# Patient Record
Sex: FEMALE | Race: WHITE | NOT HISPANIC OR LATINO | Employment: FULL TIME | URBAN - METROPOLITAN AREA
[De-identification: names, ages, dates, MRNs, and addresses within clinical notes are randomized per-mention and may not be internally consistent; named-entity substitution may affect disease eponyms.]

---

## 2017-01-11 ENCOUNTER — OFFICE VISIT (OUTPATIENT)
Dept: OBGYN | Facility: CLINIC | Age: 22
End: 2017-01-11
Payer: COMMERCIAL

## 2017-01-11 DIAGNOSIS — Z23 NEED FOR HPV VACCINATION: ICD-10-CM

## 2017-01-11 DIAGNOSIS — Z30.09 ENCOUNTER FOR COUNSELING REGARDING INITIATION OF OTHER CONTRACEPTIVE MEASURE: Primary | ICD-10-CM

## 2017-01-11 LAB — BETA HCG QUAL IFA URINE: NEGATIVE

## 2017-01-11 PROCEDURE — 90651 9VHPV VACCINE 2/3 DOSE IM: CPT | Performed by: OBSTETRICS & GYNECOLOGY

## 2017-01-11 PROCEDURE — 99203 OFFICE O/P NEW LOW 30 MIN: CPT | Mod: 25 | Performed by: OBSTETRICS & GYNECOLOGY

## 2017-01-11 PROCEDURE — 84703 CHORIONIC GONADOTROPIN ASSAY: CPT | Performed by: OBSTETRICS & GYNECOLOGY

## 2017-01-11 PROCEDURE — 90471 IMMUNIZATION ADMIN: CPT | Performed by: OBSTETRICS & GYNECOLOGY

## 2017-01-11 NOTE — NURSING NOTE
"Chief Complaint   Patient presents with     IUD       Initial There were no vitals taken for this visit. Estimated body mass index is 26.79 kg/(m^2) as calculated from the following:    Height as of 11/7/16: 5' 5\" (1.651 m).    Weight as of 11/7/16: 161 lb (73.029 kg).  BP completed using cuff size: regular    No obstetric history on file.    The following HM Due: NONE      The following patient reported/Care Every where data was sent to:  P ABSTRACT QUALITY INITIATIVES [[id number]]  none     n/a               "

## 2017-01-11 NOTE — PROGRESS NOTES
Mervin Pacheco is a 21 year old No obstetric history on file. who presents to discuss contraception with IUD.  She is interested in Paragard.  Periods are irregular, light/normal when they come, LMP was 12/5/16.  Has used condoms in the past, but had unprotected intercourse one week ago, used Plan B, UPT is negative today.  Normal pap 9/2015.  STD screen negative 11/2016, denies subsequent risk.     Will need to review detailed risks/benefits when she returns as the focus today was timing of placement, types of IUD.     OBJECTIVE: There were no vitals taken for this visit. Patient was not otherwise examined.      ASSESSMENT: Desire for contraception.  Irregular menses.     PLAN: Discussed contraception.  Option of IUD was discussed.  Risks and benefits were reviewed.  Option of Mirena vs copper IUD was discussed.  After discussion, she elects Mirena.  Discussed need to assure that she was not pregnant prior to placing IUD.  After discussion, she will avoid conception x 2 weeks, then RTC for Mirena.      Please note greater than 50% of this 30 minute appointment were spent in counseling with the patient of the issues described above in the history of present illness and in the plan, including evaluation and managment of contraception, IUD.

## 2017-05-03 ENCOUNTER — OFFICE VISIT (OUTPATIENT)
Dept: OBGYN | Facility: CLINIC | Age: 22
End: 2017-05-03
Payer: COMMERCIAL

## 2017-05-03 VITALS
OXYGEN SATURATION: 97 % | BODY MASS INDEX: 26.13 KG/M2 | DIASTOLIC BLOOD PRESSURE: 73 MMHG | HEART RATE: 129 BPM | WEIGHT: 157 LBS | SYSTOLIC BLOOD PRESSURE: 127 MMHG

## 2017-05-03 DIAGNOSIS — N88.2 STENOTIC CERVICAL OS: Primary | ICD-10-CM

## 2017-05-03 DIAGNOSIS — Z30.430 ENCOUNTER FOR INSERTION OF MIRENA IUD: ICD-10-CM

## 2017-05-03 DIAGNOSIS — Z23 NEED FOR HPV VACCINATION: ICD-10-CM

## 2017-05-03 LAB — BETA HCG QUAL IFA URINE: NEGATIVE

## 2017-05-03 PROCEDURE — 58300 INSERT INTRAUTERINE DEVICE: CPT | Mod: 52 | Performed by: OBSTETRICS & GYNECOLOGY

## 2017-05-03 PROCEDURE — 90471 IMMUNIZATION ADMIN: CPT | Performed by: OBSTETRICS & GYNECOLOGY

## 2017-05-03 PROCEDURE — 90649 4VHPV VACCINE 3 DOSE IM: CPT | Performed by: OBSTETRICS & GYNECOLOGY

## 2017-05-03 PROCEDURE — 84703 CHORIONIC GONADOTROPIN ASSAY: CPT | Performed by: OBSTETRICS & GYNECOLOGY

## 2017-05-03 NOTE — MR AVS SNAPSHOT
After Visit Summary   5/3/2017    Mervin Pacheco    MRN: [id number]           Patient Information     Date Of Birth          1995        Visit Information        Provider Department      5/3/2017 2:00 PM Joy Barnes MD [facility]        Today's Diagnoses     Stenotic cervical os    -  1    Failed insertion of Mirena IUD        Need for HPV vaccination           Follow-ups after your visit        Your next 10 appointments already scheduled     May 15, 2017  7:40 AM CDT   US PELVIC COMPLETE W TRANSVAGINAL with RDUS1   [facility]    [address] [place] [address]  [address]  [facility] [id number] [phone]           Please bring a list of your medicines (including vitamins, minerals and over-the-counter drugs). Also, tell your doctor about any allergies you may have. Wear comfortable clothes and leave your valuables at home.  Adults: Drink six 8-ounce glasses of fluid one hour before your exam. Do NOT empty your bladder.  If you need to empty your bladder before your exam, try to release only a little bit of urine. Then, drink another 8oz glass of fluid.  Children: Children who are potty trained should drink at least 4 cups (32 oz) of liquid 45 minutes to one hour prior to the exam. The child s bladder must be full in order to achieve a diagnostic exam. If your child is very uncomfortable or has an urgent need to pee, please notify a technologist; they will try to find out how much longer the wait may be and provide instructions to help relieve the pressure. Occasionally it is medically necessary to insert a urinary catheter to fill the bladder.  Please call the Imaging Department at your exam site with any questions.            May 15, 2017  8:00 AM CDT   Office Visit with Joy Barnes MD   [facility]    [address] [facility]  [address]  [facility] [id number] [phone]           Bring a  current list of meds and any records pertaining to this visit.  For Physicals, please bring immunization records and any forms needing to be filled out.  Please arrive 10 minutes early to complete paperwork.              Future tests that were ordered for you today     Open Future Orders        Priority Expected Expires Ordered    US Pelvic Complete w Transvaginal Routine 8/1/2017 5/3/2018 5/3/2017            Who to contact     If you have questions or need follow up information about today's clinic visit or your schedule please contact [facility] directly at [phone].  Normal or non-critical lab and imaging results will be communicated to you by Plot Projectshart, letter or phone within 4 business days after the clinic has received the results. If you do not hear from us within 7 days, please contact the clinic through Tetris Onlinet or phone. If you have a critical or abnormal lab result, we will notify you by phone as soon as possible.  Submit refill requests through Pocket Social or call your pharmacy and they will forward the refill request to us. Please allow 3 business days for your refill to be completed.          Additional Information About Your Visit        MyChart Information     [employer] gives you secure access to your electronic health record. If you see a primary care provider, you can also send messages to your care team and make appointments. If you have questions, please call your primary care clinic.  If you do not have a primary care provider, please call [phone] and they will assist you.        Care EveryWhere ID     This is your Care EveryWhere ID. This could be used by other organizations to access your [place] medical records  [id number]        Your Vitals Were     Pulse Last Period Pulse Oximetry Breastfeeding? BMI (Body Mass Index)       129 04/05/2017 97% No 26.13 kg/m2        Blood Pressure from Last 3 Encounters:   05/03/17 127/73   11/07/16 115/62    Weight from Last 3 Encounters:    05/03/17 157 lb (71.2 kg)   11/07/16 161 lb (73 kg)              We Performed the Following     Beta HCG qual IFA urine     EA ADD'L VACCINE     HUMAN PAPILLOMA VIRUS VACCINE        Primary Care Provider Office Phone # Fax #    Deqa Ventura Figueroa [phone]       [facility] [address]  [facility] [address]        Thank you!     Thank you for choosing [facility]  for your care. Our goal is always to provide you with excellent care. Hearing back from our patients is one way we can continue to improve our services. Please take a few minutes to complete the written survey that you may receive in the mail after your visit with us. Thank you!             Your Updated Medication List - Protect others around you: Learn how to safely use, store and throw away your medicines at www.disposemymeds.org.          This list is accurate as of: 5/3/17  3:29 PM.  Always use your most recent med list.                   Brand Name Dispense Instructions for use    zolpidem 5 MG tablet    AMBIEN    14 tablet    Take 1 tablet (5 mg) by mouth nightly as needed for sleep

## 2017-05-03 NOTE — PROGRESS NOTES
Mervin Pacheco is a 22 year old female who presents for IUD insertion.  See previous GYN notes.  We reviewed risks and benefits, and her questions were answered.    OBJECTIVE: healthy female in NAD.  /73  Pulse 129  Wt 157 lb (71.2 kg)  LMP 04/05/2017  SpO2 97%  Breastfeeding? No  BMI 26.13 kg/m2.  The uterus was normal sized, mid-position.  Speculum was placed in the vagina, and hibiclens prep used.  The cervix was grasped anteriorly with a tenaculum. The uterus could not be sounded initially.  A tapered blue dilator could be passed through the internal os, and a small rigid dilator also passed, but an adequate dilation to allow Mirena placement could not be achieved.  She tolerated the attempts well, with minimal cramping.      ASSESSMENT: Failed IUD placement.  Stenotic internal cervical os.     PLAN:  Discussed options.  Offered IUD placement under ultrasound guidance, she wishes to schedule.

## 2017-05-03 NOTE — NURSING NOTE
"Chief Complaint   Patient presents with     IUD       Initial /73  Pulse 129  Wt 157 lb (71.2 kg)  LMP 04/05/2017  SpO2 97%  Breastfeeding? No  BMI 26.13 kg/m2 Estimated body mass index is 26.13 kg/(m^2) as calculated from the following:    Height as of 11/7/16: 5' 5\" (1.651 m).    Weight as of this encounter: 157 lb (71.2 kg).  BP completed using cuff size: regular    No obstetric history on file.    The following HM Due: NONE      The following patient reported/Care Every where data was sent to:  P ABSTRACT QUALITY INITIATIVES [[id number]]  none     n/a             "

## 2017-05-15 ENCOUNTER — RADIANT APPOINTMENT (OUTPATIENT)
Dept: ULTRASOUND IMAGING | Facility: CLINIC | Age: 22
End: 2017-05-15
Attending: OBSTETRICS & GYNECOLOGY
Payer: COMMERCIAL

## 2017-05-15 ENCOUNTER — OFFICE VISIT (OUTPATIENT)
Dept: OBGYN | Facility: CLINIC | Age: 22
End: 2017-05-15
Attending: OBSTETRICS & GYNECOLOGY
Payer: COMMERCIAL

## 2017-05-15 DIAGNOSIS — N88.2 STENOTIC CERVICAL OS: ICD-10-CM

## 2017-05-15 DIAGNOSIS — Z30.430 ENCOUNTER FOR IUD INSERTION: Primary | ICD-10-CM

## 2017-05-15 LAB — BETA HCG QUAL IFA URINE: NEGATIVE

## 2017-05-15 PROCEDURE — 84703 CHORIONIC GONADOTROPIN ASSAY: CPT | Performed by: OBSTETRICS & GYNECOLOGY

## 2017-05-15 PROCEDURE — 2894A US PELVIC COMPLETE WITH TRANSVAGINAL: CPT | Performed by: OBSTETRICS & GYNECOLOGY

## 2017-05-15 PROCEDURE — 58300 INSERT INTRAUTERINE DEVICE: CPT | Performed by: OBSTETRICS & GYNECOLOGY

## 2017-05-15 PROCEDURE — 76998 US GUIDE INTRAOP: CPT | Performed by: OBSTETRICS & GYNECOLOGY

## 2017-05-15 NOTE — MR AVS SNAPSHOT
After Visit Summary   5/15/2017    Mervin Pacheco    MRN: [id number]           Patient Information     Date Of Birth          1995        Visit Information        Provider Department      5/15/2017 8:00 AM Joy Barnes MD [facility]        Today's Diagnoses     Encounter for IUD insertion    -  1       Follow-ups after your visit        Who to contact     If you have questions or need follow up information about today's clinic visit or your schedule please contact [facility] directly at [phone].  Normal or non-critical lab and imaging results will be communicated to you by Occipitalhart, letter or phone within 4 business days after the clinic has received the results. If you do not hear from us within 7 days, please contact the clinic through Videollat or phone. If you have a critical or abnormal lab result, we will notify you by phone as soon as possible.  Submit refill requests through Inuk Networks or call your pharmacy and they will forward the refill request to us. Please allow 3 business days for your refill to be completed.          Additional Information About Your Visit        MyChart Information     [employer] gives you secure access to your electronic health record. If you see a primary care provider, you can also send messages to your care team and make appointments. If you have questions, please call your primary care clinic.  If you do not have a primary care provider, please call [phone] and they will assist you.        Care EveryWhere ID     This is your Care EveryWhere ID. This could be used by other organizations to access your [place] medical records  [id number]        Your Vitals Were     Last Period                   04/05/2017            Blood Pressure from Last 3 Encounters:   05/03/17 127/73   11/07/16 115/62    Weight from Last 3 Encounters:   05/03/17 157 lb (71.2 kg)   11/07/16 161 lb (73 kg)              We Performed the Following     Beta  HCG qual IFA urine     INSERTION INTRAUTERINE DEVICE          Today's Medication Changes          These changes are accurate as of: 5/15/17  8:53 AM.  If you have any questions, ask your nurse or doctor.               Start taking these medicines.        Dose/Directions    levonorgestrel 20 MCG/24HR IUD   Commonly known as:  MIRENA   Used for:  Encounter for IUD insertion   Started by:  Joy Barnes MD        Dose:  1 each   1 each (20 mcg) by Intrauterine route once for 1 dose   Quantity:  1 each   Refills:  0            Where to get your medicines      Some of these will need a paper prescription and others can be bought over the counter.  Ask your nurse if you have questions.     You don't need a prescription for these medications     levonorgestrel 20 MCG/24HR IUD                Primary Care Provider Office Phone # Fax #    Deqa Ventura Figueroa [phone]       [place] [address] [address] [facility] [address]        Thank you!     Thank you for choosing [facility]  for your care. Our goal is always to provide you with excellent care. Hearing back from our patients is one way we can continue to improve our services. Please take a few minutes to complete the written survey that you may receive in the mail after your visit with us. Thank you!             Your Updated Medication List - Protect others around you: Learn how to safely use, store and throw away your medicines at www.disposemymeds.org.          This list is accurate as of: 5/15/17  8:53 AM.  Always use your most recent med list.                   Brand Name Dispense Instructions for use    levonorgestrel 20 MCG/24HR IUD    MIRENA    1 each    1 each (20 mcg) by Intrauterine route once for 1 dose       zolpidem 5 MG tablet    AMBIEN    14 tablet    Take 1 tablet (5 mg) by mouth nightly as needed for sleep

## 2017-05-15 NOTE — PROGRESS NOTES
Mervin Pacheco is a 22 year old female who presents for IUD insertion under ultrasound guidance.  See previous GYN notes.  We reviewed risks and benefits, and her questions were answered.    OBJECTIVE: healthy female in NAD.  LMP 04/05/2017.  The uterus was normal sized, mid-position.  Speculum was placed in the vagina, and hibiclens prep used.  The cervix was grasped anteriorly with a tenaculum. The uterus was anteverted.  Under ultrasound guidance, the uterus sounded to 6 cm.  The internal os was stenotic, and was dilated under ultrasound guidance  Using standard technique and ultrasound guidance, a Mirena IUD was placed in the endometrial cavity without difficulty.  Ultrasound confirmed fundal placement. The string was trimmed.  The instruments were removed.  She tolerated the procedure well.    ASSESSMENT: IUD placement under ultrasound guidance.  Stenotic cervical internal os.     PLAN: RTC routinely or prn.  Standard precautions.

## 2017-05-17 ENCOUNTER — MYC MEDICAL ADVICE (OUTPATIENT)
Dept: OBGYN | Facility: CLINIC | Age: 22
End: 2017-05-17

## 2017-05-17 ENCOUNTER — APPOINTMENT (OUTPATIENT)
Dept: ULTRASOUND IMAGING | Facility: CLINIC | Age: 22
End: 2017-05-17
Attending: EMERGENCY MEDICINE
Payer: COMMERCIAL

## 2017-05-17 ENCOUNTER — OFFICE VISIT (OUTPATIENT)
Dept: URGENT CARE | Facility: URGENT CARE | Age: 22
End: 2017-05-17
Payer: COMMERCIAL

## 2017-05-17 ENCOUNTER — HOSPITAL ENCOUNTER (EMERGENCY)
Facility: CLINIC | Age: 22
Discharge: HOME | End: 2017-05-17
Attending: EMERGENCY MEDICINE | Admitting: EMERGENCY MEDICINE
Payer: COMMERCIAL

## 2017-05-17 ENCOUNTER — TELEPHONE (OUTPATIENT)
Dept: NURSING | Facility: CLINIC | Age: 22
End: 2017-05-17

## 2017-05-17 VITALS
OXYGEN SATURATION: 96 % | TEMPERATURE: 98.3 F | RESPIRATION RATE: 16 BRPM | DIASTOLIC BLOOD PRESSURE: 60 MMHG | SYSTOLIC BLOOD PRESSURE: 122 MMHG | BODY MASS INDEX: 24.96 KG/M2 | WEIGHT: 150 LBS

## 2017-05-17 DIAGNOSIS — T83.32XA DISPLACEMENT OF INTRAUTERINE CONTRACEPTIVE DEVICE, INITIAL ENCOUNTER: ICD-10-CM

## 2017-05-17 DIAGNOSIS — Z30.431 CHECKING OF INTRAUTERINE DEVICE: Primary | ICD-10-CM

## 2017-05-17 DIAGNOSIS — N30.00 ACUTE CYSTITIS WITHOUT HEMATURIA: ICD-10-CM

## 2017-05-17 DIAGNOSIS — R10.2 PELVIC PAIN IN FEMALE: Primary | ICD-10-CM

## 2017-05-17 LAB
ALBUMIN UR-MCNC: NEGATIVE MG/DL
APPEARANCE UR: CLEAR
BACTERIA #/AREA URNS HPF: ABNORMAL /HPF
BILIRUB UR QL STRIP: NEGATIVE
COLOR UR AUTO: ABNORMAL
GLUCOSE UR STRIP-MCNC: NEGATIVE MG/DL
HGB UR QL STRIP: ABNORMAL
KETONES UR STRIP-MCNC: NEGATIVE MG/DL
LEUKOCYTE ESTERASE UR QL STRIP: ABNORMAL
MUCOUS THREADS #/AREA URNS LPF: PRESENT /LPF
NITRATE UR QL: NEGATIVE
PH UR STRIP: 5 PH (ref 5–7)
RBC #/AREA URNS AUTO: 2 /HPF (ref 0–2)
SP GR UR STRIP: 1.01 (ref 1–1.03)
SQUAMOUS #/AREA URNS AUTO: 3 /HPF (ref 0–1)
URN SPEC COLLECT METH UR: ABNORMAL
UROBILINOGEN UR STRIP-MCNC: NORMAL MG/DL (ref 0–2)
WBC #/AREA URNS AUTO: 6 /HPF (ref 0–2)

## 2017-05-17 PROCEDURE — 99207 ZZC NO BILLABLE SERVICE THIS VISIT: CPT

## 2017-05-17 PROCEDURE — 93976 VASCULAR STUDY: CPT

## 2017-05-17 PROCEDURE — 99284 EMERGENCY DEPT VISIT MOD MDM: CPT | Mod: 25

## 2017-05-17 PROCEDURE — 58301 REMOVE INTRAUTERINE DEVICE: CPT

## 2017-05-17 PROCEDURE — 25000132 ZZH RX MED GY IP 250 OP 250 PS 637: Performed by: EMERGENCY MEDICINE

## 2017-05-17 PROCEDURE — 81001 URINALYSIS AUTO W/SCOPE: CPT | Performed by: EMERGENCY MEDICINE

## 2017-05-17 PROCEDURE — 87086 URINE CULTURE/COLONY COUNT: CPT | Performed by: EMERGENCY MEDICINE

## 2017-05-17 RX ORDER — NITROFURANTOIN 25; 75 MG/1; MG/1
100 CAPSULE ORAL 2 TIMES DAILY
Qty: 14 CAPSULE | Refills: 0 | Status: SHIPPED | OUTPATIENT
Start: 2017-05-17 | End: 2017-10-31

## 2017-05-17 RX ORDER — HYDROCODONE BITARTRATE AND ACETAMINOPHEN 5; 325 MG/1; MG/1
1 TABLET ORAL ONCE
Status: COMPLETED | OUTPATIENT
Start: 2017-05-17 | End: 2017-05-17

## 2017-05-17 RX ADMIN — HYDROCODONE BITARTRATE AND ACETAMINOPHEN 1 TABLET: 5; 325 TABLET ORAL at 10:03

## 2017-05-17 RX ADMIN — HYDROCODONE BITARTRATE AND ACETAMINOPHEN 1 TABLET: 5; 325 TABLET ORAL at 11:58

## 2017-05-17 ASSESSMENT — ENCOUNTER SYMPTOMS
ABDOMINAL PAIN: 1
DIARRHEA: 0
CONSTIPATION: 0

## 2017-05-17 NOTE — TELEPHONE ENCOUNTER
"Call Type: Triage Call    Presenting Problem: Mervin had an IUD placed on Monday, May 15th and  is having \"severe abdominal pain.\"  [facility] Triage/Contraception:  Iud/disposition is to see ED Immediately and Mervin agreed.  Triage Note:  Guideline Title: Contraception: Intrauterine Device (IUD)  Recommended Disposition: See ED Immediately  Original Inclination: Wanted to speak with a nurse  Override Disposition:  Intended Action: Go to Hospital / ED  Physician Contacted: No  Unbearable abdominal/pelvic pain or cramping ?  YES  Sexually active AND ectopic pregnancy risk factors ? NO  New or worsening signs and symptoms that may indicate shock ? NO  Profuse vaginal bleeding not contained by pads or tampons ? NO  Heavy vaginal bleeding (soaking 1 pad/tampon every hour for 2 hours or more) ? NO  Physician Instructions:  Care Advice: Allow the patient to be in a position of comfort.  Another adult should drive.  Bring any tissue that has passed for examination by provider.  Do not eat or drink anything until evaluated by provider.  Call  if signs and symptoms of shock develop (such as unable to  stand due to faintness, dizziness, or lightheadedness  new onset of confusion  slow to respond or difficult to awaken  skin is pale, gray, cool, or moist to touch  severe weakness  loss of consciousness).  IMMEDIATE ACTION  Write down provider's name. List or place the following in a bag for  transport with the patient: current prescription and/or nonprescription  medications  alternative treatments, therapies and medications  and street drugs.  "

## 2017-05-17 NOTE — DISCHARGE INSTRUCTIONS
Discharge Instructions  Urinary Tract Infection  You have urinary tract infection, or UTI. The urinary tract includes the kidneys (which make urine), ureters (the tubes that carry urine from the kidneys to the bladder), the bladder (which stores urine), and urethra (the tube that carries urine out of the bladder).  Urinary tract infections occur when bacteria travel up the urethra into the bladder. We suspect a UTI based on chemical and microscopic findings in your urine, but if there is a question about your findings, we will do a culture to see if bacteria grow. A urine culture takes several days. You should always follow-up with your primary physician to find out about results of your culture if one was done.   Return to the Emergency Department if:    You have severe back pain.    You are vomiting so that you can t take your medicine, or have signs of dehydration (such as urinating less than 3 times per day).    You have fever over 101.5 degrees F.    You have significant confusion or are very weak, or feel very ill.    Your child seems much more ill, won t wake up, won t respond right, or is crying for a long time and won t calm down.    Your child is showing signs of dehydration, Signs of dehydration can be:  o Your infant has had no wet diapers in 4-5 hours.  o Your older child has not passed urine in 6-8 hours.  o Your infant or child starts to have dry mouth and lips, or no saliva or tears.    Follow-up with your doctor:     Children under 24 months need to be seen by their regular doctor within one week after a diagnosis of a UTI. It may be necessary to do some imaging tests to look at the child s kidney or bladder.    You should begin to feel better within 24 - 48 hours of starting your antibiotic.  If you do not, you need to be seen again.      Treatment:     You will be treated with an antibiotic to kill the bacteria. We have to make an educated guess as to which antibiotic will work for your infection.  "In most healthy people, we can guess right almost all of the time. Sometimes a culture is done to show which antibiotics will work. This usually takes 2-3 days. When the culture is done, we may have to contact you to put you on a different antibiotic.    Take a pain medication such as Tylenol  (acetaminophen), Advil  (ibuprofen), Nuprin  (ibuprofen), or Aleve  (naproxen). If you have been given a narcotic such as Vicodin  (hydrocodone with acetaminophen), Percocet  (oxycodone with acetaminophen), or codeine, do not drive for four hours after you have taken it. If the narcotic contains Tylenol  (acetaminophen), do not take Tylenol  with it. All narcotics will cause constipation, so eat a high fiber diet.      Pyridium  (phenazopyridine) or Uristat  (phenazopyridine) is a prescription medication that numbs the bladder to reduce the burning pain of some UTIs.  The same medication is available in a non-prescription version called Azo-Standard  (phenazopyridine), Urodol  (phenazopyridine), or other brand names. This medication will change the color of the urine and tears (usually blue or orange). If you wear contacts, do not wear them while taking this medication as they may be stained by the medication.    Antibiotic Warning:     If you have been placed on antibiotics - watch for signs of allergic reaction.  These include rash, lip swelling, difficulty breathing, wheezing, and dizziness.  If you develop any of these symptoms, stop the antibiotic immediately and go to an emergency room or urgent care for evaluation.    Probiotics: If you have been given an antibiotic, you may want to also take a probiotic pill or eat yogurt with live cultures. Probiotics have \"good bacteria\" to help your intestines stay healthy. Studies have shown that probiotics help prevent diarrhea and other intestine problems (including C. diff infection) when you take antibiotics. You can buy these without a prescription in the pharmacy section of " the store.   If you were given a prescription for medicine here today, be sure to read all of the information (including the package insert) that comes with your prescription.  This will include important information about the medicine, its side effects, and any warnings that you need to know about.  The pharmacist who fills the prescription can provide more information and answer questions you may have about the medicine.  If you have questions or concerns that the pharmacist cannot address, please call or return to the Emergency Department.   Opioid Medication Information    Pain medications are among the most commonly prescribed medicines, so we are including this information for all our patients. If you did not receive pain medication or get a prescription for pain medicine, you can ignore it.     You may have been given a prescription for an opioid (narcotic) pain medicine and/or have received a pain medicine while here in the Emergency Department. These medicines can make you drowsy or impaired. You must not drive, operate dangerous equipment, or engage in any other dangerous activities while taking these medications. If you drive while taking these medications, you could be arrested for DUI, or driving under the influence. Do not drink any alcohol while you are taking these medications.     Opioid pain medications can cause addiction. If you have a history of chemical dependency of any type, you are at a higher risk of becoming addicted to pain medications.  Only take these prescribed medications to treat your pain when all other options have been tried. Take it for as short a time and as few doses as possible. Store your pain pills in a secure place, as they are frequently stolen and provide a dangerous opportunity for children or visitors in your house to start abusing these powerful medications. We will not replace any lost or stolen medicine.  As soon as your pain is better, you should flush all your  remaining medication.     Many prescription pain medications contain Tylenol  (acetaminophen), including Vicodin , Tylenol #3 , Norco , Lortab , and Percocet .  You should not take any extra pills of Tylenol  if you are using these prescription medications or you can get very sick.  Do not ever take more than 3000 mg of acetaminophen in any 24 hour period.    All opioids tend to cause constipation. Drink plenty of water and eat foods that have a lot of fiber, such as fruits, vegetables, prune juice, apple juice and high fiber cereal.  Take a laxative if you don t move your bowels at least every other day. Miralax , Milk of Magnesia, Colace , or Senna  can be used to keep you regular.      Remember that you can always come back to the Emergency Department if you are not able to see your regular doctor in the amount of time listed above, if you get any new symptoms, or if there is anything that worries you.

## 2017-05-17 NOTE — TELEPHONE ENCOUNTER
Discussed with AM. Advised u/s to check placement. U/S order placed. No availability in clinic today. Checking on other locations. Might need to go to hospital. TC to patient. Given u/s scheduling numbers for [facility]. If she cannot get in there she will call back and we will get her in at the hospital. I will watch for the results.   Benita Bermudez, RN-BSN

## 2017-05-17 NOTE — TELEPHONE ENCOUNTER
Pt calling because she has been in a lot of pain since having the IUD placed on Mon 5/15. Feels like once constant horrible cramp. Has been taking 1000mg Tylenol every 4 hours and 600mg Ibuprofen in between and not getting any relief. Do you want pt to have u/s to check placement or schedule appt this afternoon? Please advise. Thanks.   Benita Bermudez, AMAIRANI-BSN

## 2017-05-17 NOTE — MR AVS SNAPSHOT
After Visit Summary   5/17/2017    Mervin Pacheco    MRN: [id number]           Patient Information     Date Of Birth          1995        Visit Information        Provider Department      5/17/2017 9:20 AM Provider, Bo Terry MD [facility]        Today's Diagnoses     Pelvic pain in female    -  1       Follow-ups after your visit        Future tests that were ordered for you today     Open Future Orders        Priority Expected Expires Ordered    US Pelvic Complete w Transvaginal Routine  5/17/2018 5/17/2017            Who to contact     If you have questions or need follow up information about today's clinic visit or your schedule please contact [facility] directly at [phone].  Normal or non-critical lab and imaging results will be communicated to you by [employer], letter or phone within 4 business days after the clinic has received the results. If you do not hear from us within 7 days, please contact the clinic through [employer] or phone. If you have a critical or abnormal lab result, we will notify you by phone as soon as possible.  Submit refill requests through [email or web address] or call your pharmacy and they will forward the refill request to us. Please allow 3 business days for your refill to be completed.          Additional Information About Your Visit        MyChart Information     [email or web address] gives you secure access to your electronic health record. If you see a primary care provider, you can also send messages to your care team and make appointments. If you have questions, please call your primary care clinic.  If you do not have a primary care provider, please call [phone] and they will assist you.        Care EveryWhere ID     This is your Care EveryWhere ID. This could be used by other organizations to access your [place] medical records  [id number]        Your Vitals Were     Last Period                   04/05/2017             Blood Pressure from Last 3 Encounters:   05/03/17 127/73   11/07/16 115/62    Weight from Last 3 Encounters:   05/03/17 157 lb (71.2 kg)   11/07/16 161 lb (73 kg)              Today, you had the following     No orders found for display       Primary Care Provider Office Phone # Fax #    Deqa Ventura Figueroa [phone]       [place] [address] [facility] [address]        Thank you!     Thank you for choosing [facility]  for your care. Our goal is always to provide you with excellent care. Hearing back from our patients is one way we can continue to improve our services. Please take a few minutes to complete the written survey that you may receive in the mail after your visit with us. Thank you!             Your Updated Medication List - Protect others around you: Learn how to safely use, store and throw away your medicines at www.disposemymeds.org.          This list is accurate as of: 5/17/17  9:28 AM.  Always use your most recent med list.                   Brand Name Dispense Instructions for use    levonorgestrel 20 MCG/24HR IUD    MIRENA    1 each    1 each (20 mcg) by Intrauterine route once for 1 dose       zolpidem 5 MG tablet    AMBIEN    14 tablet    Take 1 tablet (5 mg) by mouth nightly as needed for sleep

## 2017-05-17 NOTE — ED AVS SNAPSHOT
Emergency Department    [address] [facility] [address]    Phone:  [phone]    Fax:  [phone]                                       Mervin Pacheco   MRN: [id number]    Department:   Emergency Department   Date of Visit:  5/17/2017           Patient Information     Date Of Birth          1995        Your diagnoses for this visit were:     Displacement of intrauterine contraceptive device, initial encounter     Acute cystitis without hematuria        You were seen by Tucker Tobar MD.      Follow-up Information     Follow up with Joy Barnes MD In 2 days.    Specialty:  OB/Gyn    Contact information:    [facility]  [address]  [facility] [address] [phone]          Follow up with  Emergency Department.    Specialty:  EMERGENCY MEDICINE    Why:  As needed    Contact information:    [address] [facility] [address] [phone]        Discharge Instructions       Discharge Instructions  Urinary Tract Infection  You have urinary tract infection, or UTI. The urinary tract includes the kidneys (which make urine), ureters (the tubes that carry urine from the kidneys to the bladder), the bladder (which stores urine), and urethra (the tube that carries urine out of the bladder).  Urinary tract infections occur when bacteria travel up the urethra into the bladder. We suspect a UTI based on chemical and microscopic findings in your urine, but if there is a question about your findings, we will do a culture to see if bacteria grow. A urine culture takes several days. You should always follow-up with your primary physician to find out about results of your culture if one was done.   Return to the Emergency Department if:    You have severe back pain.    You are vomiting so that you can t take your medicine, or have signs of dehydration (such as urinating less than 3 times per day).    You have fever over 101.5 degrees F.    You have  significant confusion or are very weak, or feel very ill.    Your child seems much more ill, won t wake up, won t respond right, or is crying for a long time and won t calm down.    Your child is showing signs of dehydration, Signs of dehydration can be:  o Your infant has had no wet diapers in 4-5 hours.  o Your older child has not passed urine in 6-8 hours.  o Your infant or child starts to have dry mouth and lips, or no saliva or tears.    Follow-up with your doctor:     Children under 24 months need to be seen by their regular doctor within one week after a diagnosis of a UTI. It may be necessary to do some imaging tests to look at the child s kidney or bladder.    You should begin to feel better within 24 - 48 hours of starting your antibiotic.  If you do not, you need to be seen again.      Treatment:     You will be treated with an antibiotic to kill the bacteria. We have to make an educated guess as to which antibiotic will work for your infection. In most healthy people, we can guess right almost all of the time. Sometimes a culture is done to show which antibiotics will work. This usually takes 2-3 days. When the culture is done, we may have to contact you to put you on a different antibiotic.    Take a pain medication such as Tylenol  (acetaminophen), Advil  (ibuprofen), Nuprin  (ibuprofen), or Aleve  (naproxen). If you have been given a narcotic such as Vicodin  (hydrocodone with acetaminophen), Percocet  (oxycodone with acetaminophen), or codeine, do not drive for four hours after you have taken it. If the narcotic contains Tylenol  (acetaminophen), do not take Tylenol  with it. All narcotics will cause constipation, so eat a high fiber diet.      Pyridium  (phenazopyridine) or Uristat  (phenazopyridine) is a prescription medication that numbs the bladder to reduce the burning pain of some UTIs.  The same medication is available in a non-prescription version called Azo-Standard  (phenazopyridine),  "Urodol  (phenazopyridine), or other brand names. This medication will change the color of the urine and tears (usually blue or orange). If you wear contacts, do not wear them while taking this medication as they may be stained by the medication.    Antibiotic Warning:     If you have been placed on antibiotics - watch for signs of allergic reaction.  These include rash, lip swelling, difficulty breathing, wheezing, and dizziness.  If you develop any of these symptoms, stop the antibiotic immediately and go to an emergency room or urgent care for evaluation.    Probiotics: If you have been given an antibiotic, you may want to also take a probiotic pill or eat yogurt with live cultures. Probiotics have \"good bacteria\" to help your intestines stay healthy. Studies have shown that probiotics help prevent diarrhea and other intestine problems (including C. diff infection) when you take antibiotics. You can buy these without a prescription in the pharmacy section of the store.   If you were given a prescription for medicine here today, be sure to read all of the information (including the package insert) that comes with your prescription.  This will include important information about the medicine, its side effects, and any warnings that you need to know about.  The pharmacist who fills the prescription can provide more information and answer questions you may have about the medicine.  If you have questions or concerns that the pharmacist cannot address, please call or return to the Emergency Department.   Opioid Medication Information    Pain medications are among the most commonly prescribed medicines, so we are including this information for all our patients. If you did not receive pain medication or get a prescription for pain medicine, you can ignore it.     You may have been given a prescription for an opioid (narcotic) pain medicine and/or have received a pain medicine while here in the Emergency Department. These " medicines can make you drowsy or impaired. You must not drive, operate dangerous equipment, or engage in any other dangerous activities while taking these medications. If you drive while taking these medications, you could be arrested for DUI, or driving under the influence. Do not drink any alcohol while you are taking these medications.     Opioid pain medications can cause addiction. If you have a history of chemical dependency of any type, you are at a higher risk of becoming addicted to pain medications.  Only take these prescribed medications to treat your pain when all other options have been tried. Take it for as short a time and as few doses as possible. Store your pain pills in a secure place, as they are frequently stolen and provide a dangerous opportunity for children or visitors in your house to start abusing these powerful medications. We will not replace any lost or stolen medicine.  As soon as your pain is better, you should flush all your remaining medication.     Many prescription pain medications contain Tylenol  (acetaminophen), including Vicodin , Tylenol #3 , Norco , Lortab , and Percocet .  You should not take any extra pills of Tylenol  if you are using these prescription medications or you can get very sick.  Do not ever take more than 3000 mg of acetaminophen in any 24 hour period.    All opioids tend to cause constipation. Drink plenty of water and eat foods that have a lot of fiber, such as fruits, vegetables, prune juice, apple juice and high fiber cereal.  Take a laxative if you don t move your bowels at least every other day. Miralax , Milk of Magnesia, Colace , or Senna  can be used to keep you regular.      Remember that you can always come back to the Emergency Department if you are not able to see your regular doctor in the amount of time listed above, if you get any new symptoms, or if there is anything that worries you.       Future Appointments        Provider Department Dept  Phone Center    5/19/2017 8:00 AM Veena Figueroa MD [facility] [phone]       24 Hour Appointment Hotline       To make an appointment at any [facility], call [phone] ([phone]). If you don't have a family doctor or clinic, we will help you find one. [facility] are conveniently located to serve the needs of you and your family.             Review of your medicines      START taking        Dose / Directions Last dose taken    nitrofurantoin (macrocrystal-monohydrate) 100 MG capsule   Commonly known as:  MACROBID   Dose:  100 mg   Quantity:  14 capsule        Take 1 capsule (100 mg) by mouth 2 times daily   Refills:  0          Our records show that you are taking the medicines listed below. If these are incorrect, please call your family doctor or clinic.        Dose / Directions Last dose taken    levonorgestrel 20 MCG/24HR IUD   Commonly known as:  MIRENA   Dose:  1 each   Quantity:  1 each        1 each (20 mcg) by Intrauterine route once for 1 dose   Refills:  0        zolpidem 5 MG tablet   Commonly known as:  AMBIEN   Dose:  5 mg   Quantity:  14 tablet        Take 1 tablet (5 mg) by mouth nightly as needed for sleep   Refills:  0                Prescriptions were sent or printed at these locations (1 Prescription)                   Other Prescriptions                Printed at Department/Unit printer (1 of 1)         nitrofurantoin, macrocrystal-monohydrate, (MACROBID) 100 MG capsule                Procedures and tests performed during your visit     UA reflex to Microscopic and Culture    US Pelvic Complete w Transvaginal & Abd/Pel Duplex Limited    Urine Culture Aerobic Bacterial      Orders Needing Specimen Collection     None      Pending Results     Date and Time Order Name Status Description    5/17/2017 1000 Urine Culture Aerobic Bacterial In process             Pending Culture Results     Date and Time Order Name Status Description    5/17/2017 1000  Urine Culture Aerobic Bacterial In process             Pending Results Instructions     If you had any lab results that were not finalized at the time of your Discharge, you can call the ED Lab Result RN at [phone]. You will be contacted by this team for any positive Lab results or changes in treatment. The nurses are available 7 days a week from 10A to 6:30P.  You can leave a message 24 hours per day and they will return your call.        Test Results From Your Hospital Stay        5/17/2017 10:58 AM      Narrative     US PELVIS COMPLETE W TRANSVAGINAL AND DOPPLER LIMITED 5/17/2017 10:50  AM    HISTORY: Pelvic pain.    TECHNIQUE: Transabdominal images of the pelvis are supplemented with  endovaginal images to better define anatomy.    COMPARISON: None.    FINDINGS: The uterus measures 5.4 x 2.7 x 3.5 cm within endometrial  thickness of 6 mm. No sonographic evidence of fibroids.    The right ovary measures 3.4 x 2.0 x 1.9 cm. The left ovary measures  2.4 x 1.3 x 1.7 cm. Both ovaries demonstrate normal Doppler flow.    No free fluid in the pelvis.    There is an IUD in place within the uterus; coronal reconstructed  views of the uterus suggest extension of the IUD arms into the uterine  myometrium.        Impression     IMPRESSION: Coronal reconstructed views of the uterus suggesting IUD  extension into the uterine myometrium, as above. The remainder of the  ultrasound is otherwise unremarkable.    UVALDO SCANLON MD         5/17/2017 10:29 AM      Component Results     Component Value Ref Range & Units Status    Color Urine Light Yellow  Final    Appearance Urine Clear  Final    Glucose Urine Negative NEG mg/dL Final    Bilirubin Urine Negative NEG Final    Ketones Urine Negative NEG mg/dL Final    Specific Gravity Urine 1.006 1.003 - 1.035 Final    Blood Urine Trace (A) NEG Final    pH Urine 5.0 5.0 - 7.0 pH Final    Protein Albumin Urine Negative NEG mg/dL Final    Urobilinogen mg/dL Normal 0.0 - 2.0 mg/dL  Final    Nitrite Urine Negative NEG Final    Leukocyte Esterase Urine Moderate (A) NEG Final    Source Clean catch urine  Final    RBC Urine 2 0 - 2 /HPF Final    WBC Urine 6 (H) 0 - 2 /HPF Final    Bacteria Urine Many (A) NEG /HPF Final    Squamous Epithelial /HPF Urine 3 (H) 0 - 1 /HPF Final    Mucous Urine Present (A) NEG /LPF Final         5/17/2017 11:04 AM                Clinical Quality Measure: Blood Pressure Screening     Your blood pressure was checked while you were in the emergency department today. The last reading we obtained was  BP: 142/85 . Please read the guidelines below about what these numbers mean and what you should do about them.  If your systolic blood pressure (the top number) is less than 120 and your diastolic blood pressure (the bottom number) is less than 80, then your blood pressure is normal. There is nothing more that you need to do about it.  If your systolic blood pressure (the top number) is 120-139 or your diastolic blood pressure (the bottom number) is 80-89, your blood pressure may be higher than it should be. You should have your blood pressure rechecked within a year by a primary care provider.  If your systolic blood pressure (the top number) is 140 or greater or your diastolic blood pressure (the bottom number) is 90 or greater, you may have high blood pressure. High blood pressure is treatable, but if left untreated over time it can put you at risk for heart attack, stroke, or kidney failure. You should have your blood pressure rechecked by a primary care provider within the next 4 weeks.  If your provider in the emergency department today gave you specific instructions to follow-up with your doctor or provider even sooner than that, you should follow that instruction and not wait for up to 4 weeks for your follow-up visit.        Thank you for choosing [facility]       Thank you for choosing [facility] for your care. Our goal is always to provide you with excellent care.  Hearing back from our patients is one way we can continue to improve our services. Please take a few minutes to complete the written survey that you may receive in the mail after you visit with us. Thank you!        [employer] Information     [employer] gives you secure access to your electronic health record. If you see a primary care provider, you can also send messages to your care team and make appointments. If you have questions, please call your primary care clinic.  If you do not have a primary care provider, please call [phone] and they will assist you.        Care EveryWhere ID     This is your Care EveryWhere ID. This could be used by other organizations to access your [place] medical records  [id number]        After Visit Summary       This is your record. Keep this with you and show to your community pharmacist(s) and doctor(s) at your next visit.

## 2017-05-17 NOTE — ED AVS SNAPSHOT
Emergency Department    [address] [place] [address]    Phone:  [phone]    Fax:  [phone]                                       Mervin Pacheco   MRN: [id number]    Department:   Emergency Department   Date of Visit:  5/17/2017           After Visit Summary Signature Page     I have received my discharge instructions, and my questions have been answered. I have discussed any challenges I see with this plan with the nurse or doctor.    ..........................................................................................................................................  Patient/Patient Representative Signature      ..........................................................................................................................................  Patient Representative Print Name and Relationship to Patient    ..................................................               ................................................  Date                                            Time    ..........................................................................................................................................  Reviewed by Signature/Title    ...................................................              ..............................................  Date                                                            Time

## 2017-05-17 NOTE — ED PROVIDER NOTES
History     Chief Complaint:  Abdominal Pain    HPI   Mervin Pacheco is a 22 year old female who presents to the emergency department today for evaluation of abdominal pain. The patient states she has had lower abdominal pain since her IUD placement on May 15. She is taking Tylenol and Ibuprofen at home for pain relief. She went to urgent care this morning in an attempt to get an ultrasound but was referred here. The patient states she is urinating more and having the urge to have a bowel movement. There has been no diarrhea or constipation.     Allergies:  No Known Drug Allergies      Medications:    Mirena  Ambien     Past Medical History:    History reviewed. No pertinent past medical history.     Past Surgical History:    Appendectomy  IUD placement     Family History:    Father-hypertension, hyperlipidemia, diabetes    Social History:  The patient was accompanied to the ED by significant other.  Smoking Status: never smoker  Alcohol Use: positive    Marital Status:  Single [1]    Review of Systems   Gastrointestinal: Positive for abdominal pain. Negative for constipation and diarrhea.   All other systems reviewed and are negative.    Physical Exam   Vitals:  Patient Vitals for the past 24 hrs:   BP Temp Temp src Heart Rate Resp SpO2 Weight   05/17/17 0941 142/85 98.4  F (36.9  C) Oral 75 18 97 % 68 kg (150 lb)       Physical Exam  Eyes:  The pupils are equal and round    Conjunctivae and sclerae are normal  CV:  Regular rate and rhythm     No edema  Resp:  Lungs are clear    Non-labored    No rales    No wheezing   GI:  Abdomen is soft, there is no rigidity    No distension    Tenderness to palpation of the lower abdomen in the midline.   :  Cervix appears normal. String in place. Removed (see procedure note below)  MS:  Normal muscular tone    No asymmetric leg swelling  Skin:  No rash or acute skin lesions noted  Neuro:   Awake, alert.      Speech is normal and fluent.    Face is symmetric.     Moves all  extremities    Emergency Department Course     Imaging:  Radiology findings were communicated with the patient who voiced understanding of the findings.    US Pelvic Complete w Transvaginal & Abd/Pel Duplex Limited   IMPRESSION: Coronal reconstructed views of the uterus suggesting IUD   extension into the uterine myometrium, as above. The remainder of the   ultrasound is otherwise unremarkable.      UVALDO SCANLON MD     Laboratory:  Laboratory findings were communicated with the patient who voiced understanding of the findings.    UA: Blood: Trace (A), Leukocyte Esterase: Moderate (A), Bacteria: Many (A), Squamous Epithelial: 3 (H), Mucous: Present (A)     Procedure:    Narrative: Procedure: IUD Removal       INDICATION: IUD pain      CONSENT: The patient was verbally consented regarding the risks, benefits of the   procedure.      TECHNIQUE: The patient tolerated the procedure well without difficulty.  There were no   Complications. The procedure was performed with a female chaperone.     Interventions:  1003 Norco 325 mg oral      Emergency Department Course:  Nursing notes and vitals reviewed.  I performed an exam of the patient as documented above.   The patient was sent for imaging per above while in the emergency department, results above.   The patient provided a urine sample here in the emergency department. This was sent for laboratory testing, findings above.   11:20 AM: I spoke with Dr. Barnes of the OBGYN service regarding patient's presentation, findings, and plan of care.   At 1127 the patient was rechecked on and was updated on the results of her studies.    I discussed the treatment plan with the patient. They expressed understanding of this plan and consented to discharge. They will be discharged home with instructions for care and follow up. In addition, the patient will return to the emergency department if their symptoms persist, worsen, if new symptoms arise or if there is any concern.  All  questions were answered.   I personally reviewed the laboratory and imaging results with the patient and answered all related questions prior to discharge.    Impression & Plan      Medical Decision Making:  Mervin Pacheco is a 22 year old female who presents to the emergency department with pelvic pain. The pelvic pain started immediately after having an IUD placed and has continued since that time for the past two days. She was unable to get into her clinic and went to urgent care who referred her here for ultrasound and evaluation. Ultrasound reveals that the IUD may have come displaced in her myometrium. I discussed with her OBGYN who recommended removing the IUD. The IUD was easily removed by pulling on the string. She had some mild improvement of her symptoms afterwards. She is given additional pain medicine, but I suspect that her symptoms will improve significantly now that the IUD is removed. She should follow up with her OBGYN. Return to the ED for new or concerning symptoms.     Diagnosis:    ICD-10-CM    1. Displacement of intrauterine contraceptive device, initial encounter T83.32XA    2. Acute cystitis without hematuria N30.00      Disposition:   Discharge    Discharge Medications:  New Prescriptions    NITROFURANTOIN, MACROCRYSTAL-MONOHYDRATE, (MACROBID) 100 MG CAPSULE    Take 1 capsule (100 mg) by mouth 2 times daily     Scribe Disclosure:  I, Srinivasan Del Castillo, am serving as a scribe at 9:45 AM on 5/17/2017 to document services personally performed by Tucker Tobar MD, based on my observations and the provider's statements to me.   5/17/2017    EMERGENCY DEPARTMENT       Tucker Tobar MD  05/17/17 0207

## 2017-05-17 NOTE — TELEPHONE ENCOUNTER
I was called by the [facility] ED physician, Ms. Pacheco had gone to Urgent Care but been directed to the ED.  An ultrasound showed the IUD in the endometrial cavity, fundally placed, but one view suggested that the arms might be extending into the myometrium.  The ED physician removed the IUD.  I called Ms. Pacheco this evening, and she reports that she feels much better, decreased pain.  We discussed followup--she will see her primary MD 5/19 and plans to discuss contraception options with her.  Discussed possible further investigation into uterine anatomy, which might include hydrosonogram, or hysterosalpingogram, as an IUD placed as her was should not penetrate the myometrium.  She will consider this, call prn if questions/problems.

## 2017-05-18 LAB
BACTERIA SPEC CULT: NORMAL
Lab: NORMAL
MICRO REPORT STATUS: NORMAL
SPECIMEN SOURCE: NORMAL

## 2017-05-19 ENCOUNTER — OFFICE VISIT (OUTPATIENT)
Dept: FAMILY MEDICINE | Facility: CLINIC | Age: 22
End: 2017-05-19
Payer: COMMERCIAL

## 2017-05-19 ENCOUNTER — TELEPHONE (OUTPATIENT)
Dept: FAMILY MEDICINE | Facility: CLINIC | Age: 22
End: 2017-05-19

## 2017-05-19 VITALS
HEART RATE: 85 BPM | DIASTOLIC BLOOD PRESSURE: 86 MMHG | BODY MASS INDEX: 26.99 KG/M2 | OXYGEN SATURATION: 98 % | WEIGHT: 162 LBS | HEIGHT: 65 IN | TEMPERATURE: 97.3 F | SYSTOLIC BLOOD PRESSURE: 122 MMHG | RESPIRATION RATE: 16 BRPM

## 2017-05-19 DIAGNOSIS — Z30.015 ENCOUNTER FOR INITIAL PRESCRIPTION OF VAGINAL RING HORMONAL CONTRACEPTIVE: Primary | ICD-10-CM

## 2017-05-19 DIAGNOSIS — T83.32XD DISPLACEMENT OF INTRAUTERINE CONTRACEPTIVE DEVICE, SUBSEQUENT ENCOUNTER: ICD-10-CM

## 2017-05-19 PROCEDURE — 99214 OFFICE O/P EST MOD 30 MIN: CPT | Performed by: FAMILY MEDICINE

## 2017-05-19 RX ORDER — ETONOGESTREL AND ETHINYL ESTRADIOL VAGINAL RING .015; .12 MG/D; MG/D
RING VAGINAL
Qty: 3 EACH | Refills: 3 | Status: SHIPPED | OUTPATIENT
Start: 2017-05-19 | End: 2018-05-02

## 2017-05-19 NOTE — PROGRESS NOTES
"  SUBJECTIVE:                                                    Mervin Pacheco is a 22 year old female who presents to clinic today for the following health issues:      ED/UC Followup:    Facility:  [facility]   Date of visit: 5/17/17  Reason for visit: Abdominal pain  Current Status: Slightly pain in the abdominal and an increase in bleeding since ED visit.         Pt had IUD on 05/15/2017. She was seen at  which referred her to ER. She had an US which showed IUD extension into the myometrium. She had IUD removed and was tx for a UTI. She is now having mild pain. She has 1/2 -1 tablespoon of blood/hour. One pad every 2 hours. No nausea, vomiting, fever, chills, dysuria, vaginal discharge or vaginal itching.   LMP 05/11/2017.    Problem list and histories reviewed & adjusted, as indicated.  Additional history: as documented    Labs reviewed in EPIC    Reviewed and updated as needed this visit by clinical staff       Reviewed and updated as needed this visit by Provider         ROS:  Constitutional, HEENT, cardiovascular, pulmonary, gi and gu systems are negative, except as otherwise noted.    OBJECTIVE:                                                    /86 (BP Location: Right arm, Patient Position: Chair, Cuff Size: Adult Regular)  Pulse 85  Temp 97.3  F (36.3  C) (Tympanic)  Resp 16  Ht 5' 5\" (1.651 m)  Wt 162 lb (73.5 kg)  LMP 05/11/2017 (Exact Date)  SpO2 98%  BMI 26.96 kg/m2  Body mass index is 26.96 kg/(m^2).  GENERAL: healthy, alert and no distress  ABDOMEN: soft, nontender, no hepatosplenomegaly, no masses and bowel sounds normal   (female): normal female external genitalia, normal urethral meatus, vaginal wall with blood in the vault, normal cervix     Diagnostic Test Results:  Reviewed US and UPT   UC was negative, stop abx     ASSESSMENT/PLAN:                                                      ## Encounter for initial prescription of vaginal ring hormonal contraceptive  - " recommended using condoms for one week, AVS for more instructions about removal, expulsion of ring  - etonogestrel-ethinyl estradiol (NUVARING) 0.12-0.015 MG/24HR vaginal ring; Insert 1 ring vaginally every 21 days then remove for 1 week then repeat with new ring.  Dispense: 3 each; Refill: 3      ## Displacement of intrauterine contraceptive device, subsequent encounter  - IUD removed two days ago. Abdominal pain has improved but pt has more bleeding. Normal pelvic exam. Triage nurse discussed with OBGYN clinic - recommendations were to f/u in ER if bleeding through heavy pad in 1 hour for 2 hours straight.         Veena Figueroa MD  [facility]

## 2017-05-19 NOTE — TELEPHONE ENCOUNTER
PCP wanted triage to call OBGYN clinic.  Pt had a Mirena placed on 5/15/17.  Removed on 5/17/17.  Pain is better.    Now has bleeding . Changed pad every 2 hours.  Did u/s.   Anything else to do?    Menses on 5/11/17. Preg test negative.  Bleeding is new now since ER.    Talked to OBGYN triage- They said nothing more to do. If pt is bleeding thru a heavy pad in 1 hour for 2 hours straight, pt should go to ER.  May have irregular bleeding from the IUD placement and removal.  Routing to pcp.  AMAIRANI Austin

## 2017-05-19 NOTE — TELEPHONE ENCOUNTER
Routing to OB triage -- please contact patient. She has many questions about her bleeding. Per patient, she is soaking a pad every two hours. We discussed that if it increases to 1 pad an hour for 2 hours, she needs to go back to ED. Pt understands this but has more questions, would like to talk to OB nurse. I tried to transfer to [facility], but got the message that system was unavailable.      Thanks  Kristi Garvin, CHERYLEN, RN  [facility]

## 2017-05-19 NOTE — PATIENT INSTRUCTIONS
Switching from progestin-only contraceptive: A spermicide or barrier method of contraception should be used for the following 7 days with any of the following.*  Inadvertent removal or expulsion: If the ring is accidentally removed from the vagina at any time during the 3-week period of use, it may be rinsed with cool or lukewarm water (not hot) and reinserted as soon as possible. If the ring is not reinserted within 3 hours, contraceptive effectiveness will be decreased. If the ring is accidently removed from the vagina for >3 hours during weeks 1 and 2, the ring should be reinserted as soon as the woman remembers and a spermicide or barrier method of contraception should be used until the ring has been in place for 7 consecutive days.* If the ring is accidently removed from the vagina for >3 hours during week 3, the ring should be discarded. A new ring may be inserted immediately, restarting a new 3-week cycle, OR a new ring may be inserted ?7 days from the time the previous ring was removed or expelled (the second option should only be done if a vaginal ring was in continuous use for ?7 days prior to the inadvertent expulsion/removal). With either option, a spermicide or barrier method of contraception should be used until the ring has been in place for 7 consecutive days.* Additional guidelines are available (CDC, 2013).  If the ring has been removed for longer than 1 week, pregnancy must be ruled out prior to restarting therapy. A spermicide or barrier method of contraception should be used for the following 7 days.*  Prolonged use: If the ring has been left in place for up to 1 extra week (up to 4 weeks total); a new ring should be inserted following a 1-week (ring-free) interval. Protection continues during week 4, however, if the ring is left in place >4 weeks, pregnancy must be ruled out prior to insertion and a spermicide or barrier method of contraception should be used for the following 7 days.*

## 2017-05-19 NOTE — MR AVS SNAPSHOT
After Visit Summary   5/19/2017    Mervin Pacheco    MRN: [id number]           Patient Information     Date Of Birth          1995        Visit Information        Provider Department      5/19/2017 8:00 AM Veena Figueroa MD [facility]        Today's Diagnoses     Encounter for initial prescription of vaginal ring hormonal contraceptive    -  1      Care Instructions    Switching from progestin-only contraceptive: A spermicide or barrier method of contraception should be used for the following 7 days with any of the following.*  Inadvertent removal or expulsion: If the ring is accidentally removed from the vagina at any time during the 3-week period of use, it may be rinsed with cool or lukewarm water (not hot) and reinserted as soon as possible. If the ring is not reinserted within 3 hours, contraceptive effectiveness will be decreased. If the ring is accidently removed from the vagina for >3 hours during weeks 1 and 2, the ring should be reinserted as soon as the woman remembers and a spermicide or barrier method of contraception should be used until the ring has been in place for 7 consecutive days.* If the ring is accidently removed from the vagina for >3 hours during week 3, the ring should be discarded. A new ring may be inserted immediately, restarting a new 3-week cycle, OR a new ring may be inserted ?7 days from the time the previous ring was removed or expelled (the second option should only be done if a vaginal ring was in continuous use for ?7 days prior to the inadvertent expulsion/removal). With either option, a spermicide or barrier method of contraception should be used until the ring has been in place for 7 consecutive days.* Additional guidelines are available (CDC, 2013).  If the ring has been removed for longer than 1 week, pregnancy must be ruled out prior to restarting therapy. A spermicide or barrier method of contraception should be used for the  "following 7 days.*  Prolonged use: If the ring has been left in place for up to 1 extra week (up to 4 weeks total); a new ring should be inserted following a 1-week (ring-free) interval. Protection continues during week 4, however, if the ring is left in place >4 weeks, pregnancy must be ruled out prior to insertion and a spermicide or barrier method of contraception should be used for the following 7 days.*          Follow-ups after your visit        Who to contact     If you have questions or need follow up information about today's clinic visit or your schedule please contact [facility] directly at [phone].  Normal or non-critical lab and imaging results will be communicated to you by MyChart, letter or phone within 4 business days after the clinic has received the results. If you do not hear from us within 7 days, please contact the clinic through [employer] or phone. If you have a critical or abnormal lab result, we will notify you by phone as soon as possible.  Submit refill requests through [employer] or call your pharmacy and they will forward the refill request to us. Please allow 3 business days for your refill to be completed.          Additional Information About Your Visit        [employer] Information     [employer] gives you secure access to your electronic health record. If you see a primary care provider, you can also send messages to your care team and make appointments. If you have questions, please call your primary care clinic.  If you do not have a primary care provider, please call [phone] and they will assist you.        Care EveryWhere ID     This is your Care EveryWhere ID. This could be used by other organizations to access your [place] medical records  [id number]        Your Vitals Were     Pulse Temperature Respirations Height Last Period Pulse Oximetry    85 97.3  F (36.3  C) (Tympanic) 16 5' 5\" (1.651 m) 05/11/2017 (Exact Date) 98%    BMI (Body Mass Index)             "       26.96 kg/m2            Blood Pressure from Last 3 Encounters:   05/19/17 122/86   05/17/17 122/60   05/03/17 127/73    Weight from Last 3 Encounters:   05/19/17 162 lb (73.5 kg)   05/17/17 150 lb (68 kg)   05/03/17 157 lb (71.2 kg)              Today, you had the following     No orders found for display         Today's Medication Changes          These changes are accurate as of: 5/19/17  8:59 AM.  If you have any questions, ask your nurse or doctor.               Start taking these medicines.        Dose/Directions    etonogestrel-ethinyl estradiol 0.12-0.015 MG/24HR vaginal ring   Commonly known as:  NUVARING   Used for:  Encounter for initial prescription of vaginal ring hormonal contraceptive   Started by:  Veena Figueroa MD        Insert 1 ring vaginally every 21 days then remove for 1 week then repeat with new ring.   Quantity:  3 each   Refills:  3            Where to get your medicines      These medications were sent to [facility] [place] [id number]  [address] [place] [id number]     Phone:  [phone]     etonogestrel-ethinyl estradiol 0.12-0.015 MG/24HR vaginal ring                Primary Care Provider Office Phone # Fax #    Veena Figueroa [phone]       [facility] [address]  [facility] [address]        Thank you!     Thank you for choosing [facility]  for your care. Our goal is always to provide you with excellent care. Hearing back from our patients is one way we can continue to improve our services. Please take a few minutes to complete the written survey that you may receive in the mail after your visit with us. Thank you!             Your Updated Medication List - Protect others around you: Learn how to safely use, store and throw away your medicines at www.disposemymeds.org.          This list is accurate as of: 5/19/17  8:59 AM.  Always use your most recent med list.                    Brand Name Dispense Instructions for use    etonogestrel-ethinyl estradiol 0.12-0.015 MG/24HR vaginal ring    NUVARING    3 each    Insert 1 ring vaginally every 21 days then remove for 1 week then repeat with new ring.       nitrofurantoin (macrocrystal-monohydrate) 100 MG capsule    MACROBID    14 capsule    Take 1 capsule (100 mg) by mouth 2 times daily

## 2017-05-19 NOTE — NURSING NOTE
"Chief Complaint   Patient presents with     Hospital F/U     Ed follow-up       Initial /86 (BP Location: Right arm, Patient Position: Chair, Cuff Size: Adult Regular)  Pulse 85  Temp 97.3  F (36.3  C) (Tympanic)  Resp 16  Ht 5' 5\" (1.651 m)  Wt 162 lb (73.5 kg)  LMP 05/11/2017 (Exact Date)  SpO2 98%  BMI 26.96 kg/m2 Estimated body mass index is 26.96 kg/(m^2) as calculated from the following:    Height as of this encounter: 5' 5\" (1.651 m).    Weight as of this encounter: 162 lb (73.5 kg).  Medication Reconciliation: complete        Danielle Chavez MA    "

## 2017-05-19 NOTE — TELEPHONE ENCOUNTER
Discussed bleeding with pt.  She is using a fairly minimal pad and is changing it every 2 hours or so.  Discussed that typically bleeding would be heavier before going to ER and that she could monitor the bleeding for now.  Bleeding is likely due to disruption of the uterine lining when the IUD was taken out.  Pt is happy to continue to monitor and knows that if bleeding increases or she starts having severe pain that she should be evaluated at the ER.  All of her questions were answered.  Christina Pugh RN

## 2017-09-13 ENCOUNTER — ALLIED HEALTH/NURSE VISIT (OUTPATIENT)
Dept: NURSING | Facility: CLINIC | Age: 22
End: 2017-09-13
Payer: COMMERCIAL

## 2017-09-13 DIAGNOSIS — Z23 NEED FOR PROPHYLACTIC VACCINATION AND INOCULATION AGAINST INFLUENZA: Primary | ICD-10-CM

## 2017-09-13 DIAGNOSIS — Z23 NEED FOR HPV VACCINATION: ICD-10-CM

## 2017-09-13 PROCEDURE — 90686 IIV4 VACC NO PRSV 0.5 ML IM: CPT

## 2017-09-13 PROCEDURE — 90651 9VHPV VACCINE 2/3 DOSE IM: CPT

## 2017-09-13 PROCEDURE — 90471 IMMUNIZATION ADMIN: CPT

## 2017-09-13 PROCEDURE — 99207 ZZC NO CHARGE NURSE ONLY: CPT

## 2017-09-13 PROCEDURE — 90472 IMMUNIZATION ADMIN EACH ADD: CPT

## 2017-09-13 NOTE — PROGRESS NOTES
Injectable Influenza Immunization Documentation    1.  Are you sick today? (Fever of 100.5 or higher on the day of the clinic)   No    2.  Have you ever had Guillain-Golden Eagle Syndrome within 6 weeks of an influenza vaccionation?  No    3. Do you have a life-threatening allergy to eggs?  No    4. Do you have a life-threatening allergy to a component of the vaccine? May include antibiotics, gelatin or latex.  No     5. Have you ever had a reaction to a dose of flu vaccine that needed immediate medical attention?  No     Form completed by   Haley Greene MA

## 2017-09-13 NOTE — MR AVS SNAPSHOT
After Visit Summary   9/13/2017    Mervin Pacheco    MRN: [id number]           Patient Information     Date Of Birth          1995        Visit Information        Provider Department      9/13/2017 11:00 AM HW MEDICAL ASSISTANT [facility]        Today's Diagnoses     Need for prophylactic vaccination and inoculation against influenza    -  1    Need for HPV vaccination           Follow-ups after your visit        Who to contact     If you have questions or need follow up information about today's clinic visit or your schedule please contact [facility] directly at [phone].  Normal or non-critical lab and imaging results will be communicated to you by [employer], letter or phone within 4 business days after the clinic has received the results. If you do not hear from us within 7 days, please contact the clinic through [employer] or phone. If you have a critical or abnormal lab result, we will notify you by phone as soon as possible.  Submit refill requests through [employer] or call your pharmacy and they will forward the refill request to us. Please allow 3 business days for your refill to be completed.          Additional Information About Your Visit        MyChart Information     [employer] gives you secure access to your electronic health record. If you see a primary care provider, you can also send messages to your care team and make appointments. If you have questions, please call your primary care clinic.  If you do not have a primary care provider, please call [phone] and they will assist you.        Care EveryWhere ID     This is your Care EveryWhere ID. This could be used by other organizations to access your [facility] medical records  [id number]         Blood Pressure from Last 3 Encounters:   05/19/17 122/86   05/17/17 122/60   05/03/17 127/73    Weight from Last 3 Encounters:   05/19/17 162 lb (73.5 kg)   05/17/17 150 lb (68 kg)   05/03/17 157 lb (71.2 kg)               We Performed the Following     FLU VAC, SPLIT VIRUS IM > 3 YO (QUADRIVALENT) [07554]     HUMAN PAPILLOMA VIRUS (GARDASIL 9) VACCINE     VACCINE ADMINISTRATION, EACH ADDITIONAL     Vaccine Administration, Initial [15148]        Primary Care Provider Office Phone # Fax #    Veena Ventura Figueroa [phone]       [address]  [facility] [address]        Equal Access to Services     MARY KAY MUIR : Hadii aad ku hadasho Soomaali, waaxda luqadaha, qaybta kaalmada adeegyada, waxay idiin hayaan adeeg lucielane ladayanara . So [facility] [phone].    ATENCIÓN: Si doris love, tiene a mejía disposición servicios gratuitos de asistencia lingüística. Llame al [phone].    We comply with applicable federal civil rights laws and [place] laws. We do not discriminate on the basis of race, color, national origin, age, disability sex, sexual orientation or gender identity.            Thank you!     Thank you for choosing [facility]  for your care. Our goal is always to provide you with excellent care. Hearing back from our patients is one way we can continue to improve our services. Please take a few minutes to complete the written survey that you may receive in the mail after your visit with us. Thank you!             Your Updated Medication List - Protect others around you: Learn how to safely use, store and throw away your medicines at www.disposemymeds.org.          This list is accurate as of: 9/13/17 11:47 AM.  Always use your most recent med list.                   Brand Name Dispense Instructions for use Diagnosis    etonogestrel-ethinyl estradiol 0.12-0.015 MG/24HR vaginal ring    NUVARING    3 each    Insert 1 ring vaginally every 21 days then remove for 1 week then repeat with new ring.    Encounter for initial prescription of vaginal ring hormonal contraceptive       nitroFURantoin (macrocrystal-monohydrate) 100 MG capsule    MACROBID    14 capsule    Take 1 capsule (100 mg)  by mouth 2 times daily

## 2017-10-31 ENCOUNTER — OFFICE VISIT (OUTPATIENT)
Dept: FAMILY MEDICINE | Facility: CLINIC | Age: 22
End: 2017-10-31
Payer: COMMERCIAL

## 2017-10-31 VITALS
SYSTOLIC BLOOD PRESSURE: 125 MMHG | TEMPERATURE: 97.9 F | HEART RATE: 66 BPM | WEIGHT: 153 LBS | OXYGEN SATURATION: 98 % | DIASTOLIC BLOOD PRESSURE: 63 MMHG | RESPIRATION RATE: 14 BRPM | BODY MASS INDEX: 25.46 KG/M2

## 2017-10-31 DIAGNOSIS — G47.00 INSOMNIA, UNSPECIFIED TYPE: ICD-10-CM

## 2017-10-31 DIAGNOSIS — H93.13 TINNITUS, BILATERAL: Primary | ICD-10-CM

## 2017-10-31 PROCEDURE — 99214 OFFICE O/P EST MOD 30 MIN: CPT | Performed by: PHYSICIAN ASSISTANT

## 2017-10-31 RX ORDER — ZOLPIDEM TARTRATE 5 MG/1
5 TABLET ORAL
Qty: 14 TABLET | Refills: 0 | Status: SHIPPED | OUTPATIENT
Start: 2017-10-31 | End: 2018-07-18

## 2017-10-31 ASSESSMENT — ENCOUNTER SYMPTOMS
FOCAL WEAKNESS: 0
VOMITING: 0
HEADACHES: 0
FEVER: 0
INSOMNIA: 1
NAUSEA: 0
SHORTNESS OF BREATH: 0
DIARRHEA: 0
ABDOMINAL PAIN: 0
CHILLS: 0

## 2017-10-31 NOTE — PROGRESS NOTES
"  SUBJECTIVE:   Mervin Pacheco is a 22 year old female who presents to clinic today for the following health issues:      Pt comes in clinic today to discuss an ear problem, she states her ears have been ringing for the past 3 weeks it started when she got sick about 3 weeks ago and hasn't resolved. She states that this is the first time its ever happened she denies any other symptoms besides a runny nose.     High pitched, mostly in AM and at night. Non-pulsatile. Also rhinorrhea. Denies hearing loss, dizziness, jaw pain, neck pain, HA, fever/chills. Tried warm water & hydrogen peroxide rinses but this didn't help.    Insomnia  She would also like to insomnia she states this has been going on for a little over a month she states that she falls asleep fine at about 1030pm and wake up at 2:00am stay awake until 530am and then alarm goes off at 6:00am. She states this happened last November   No hx depression or anxiety  Does not snore   Naps once every 2 weeks for about 45 mins  Goes to bed around 10, falls asleep around 10:30pm  Tries listening to music, does stay in bed   Got engaged    {additional problems for provider to add:566302}    Problem list and histories reviewed & adjusted, as indicated.  Additional history: {NONE - AS DOCUMENTED:875395::\"as documented\"}    {HIST REVIEW/ LINKS 2:223154}    Reviewed and updated as needed this visit by clinical staff  Allergies  Meds  Med Hx  Surg Hx  Fam Hx  Soc Hx      Reviewed and updated as needed this visit by Provider         {PROVIDER CHARTING PREFERENCE:343648}    "

## 2017-10-31 NOTE — PATIENT INSTRUCTIONS
Try Flonase or Nasonex for the ringing in the ears.      Tinnitus (Ringing in the Ears)     Treatment may include maskers and hearing aids.     Tinnitus is the term for a noise in your ear not caused by an outside sound. The noise might be a ringing, clicking, hiss, or roar. It can vary in pitch and may be soft or quite loud. For some people, tinnitus is a minor nuisance. But for others, the noise can make it hard to hear, work, and even sleep. When tinnitus can't be cured, a number of treatments may offer relief.  What causes tinnitus?  Loud noises, hearing loss, and ear wax can cause tinnitus. So can certain medicines. Large amounts of aspirin or caffeine are sometimes to blame. In many cases, the exact cause of tinnitus is unknown.  How is tinnitus treated?  Identifying and removing the cause is the best way to treat tinnitus. For that reason, your healthcare provider may refer you to an otolaryngologist (ear, nose, and throat doctor). Your hearing may also be checked by an audiologist (hearing specialist). If you have hearing loss, wearing a hearing aid may help your tinnitus. When the cause can't be found, the tinnitus itself may be treated. Some of the treatments are listed below, and your healthcare provider can tell you more about them:    Maskers are small devices that look like hearing aids. They emit a pleasant sound that helps cover up the ringing in your ears. Hearing aids and maskers are sometimes used together.    Medicines that treat anxiety and depression may ease tinnitus in some people.    Hypnosis or relaxation therapy may help head noise seem less severe.    Tinnitus retraining therapy combines counseling and maskers. Both can help take your mind off the tinnitus.  For more information    American Speech-Hearing-Language Association [phone] www.purnima.org    American Tinnitus Association [phone] www.marilu.org    National [place] on Deafness and other Communication Disorders [phone]  www.nidcd.nih.gov   Date Last Reviewed: 7/1/2016 2000-2017 [employer] [place]. [address] [place] [address], [place] [address]. All rights reserved. This information is not intended as a substitute for professional medical care. Always follow your healthcare professional's instructions.        Insomnia  Insomnia is repeated difficulty going to sleep or staying asleep, or both. Whether you have insomnia is not defined by a specific amount of sleep. Different people need different amounts of sleep, and you may need more or less sleep at different times of your life.  There are 3 major types of insomnia:  short-term, chronic, and  other.   Short-term, or acute insomnia lasts less than 3 months.  The symptoms are temporary and can be linked directly to a stressor, such as the death of a loved one, financial problems, or a new physical problem.  Short-term insomnia stops when the stressor resolves or the person adapts to its presence.  Chronic insomnia occurs at least 3 times a week and lasts longer than 3 months.  Chronic insomnia can occur when either the cause of the sleeping problem is not clear, or the insomnia does not get better when the stressor is resolved. A number of other criteria are also used to make the diagnosis of chronic insomnia.    Other insomnia  is the third type of insomnia-related sleep disorders.  This description applies to people who have problems getting to sleep or staying asleep, but do not meet all of the factors that describe either short-term or chronic insomnia.    Many things cause insomnia. Different people may have different causes. It can be from an underlying medical or psychological condition, or lifestyle. It can also be primary insomnia, which means no cause can be found.  Causes of insomnia include:    Chronic medical problems- heart disease, gastrointestinal problems, hormonal changes, breathing problems    Anxiety    Stress    Depression    Pain    Work schedule    Sleep  apnea    Illegal drugs    Certain medicines  Many different medidcines can affect your sleep, such as stimulants, caffeine, alcohol, some decongestants, and diet pills. Other medicines may include some types of blood pressure pills, steroids, asthma medicines, antihistamines, antidepressants, seizure medicines and statins. Not all of these will affect your sleep, and they shouldn t be stopped without talking to your doctor.  Symptoms of insomnia can include:    Lying awake for long periods at night before falling asleep    Waking up several times during the night    Waking up early in the morning and not being able to get back to sleep    Feeling tired and not refreshed by sleep    Not being able to function properly during the day and finding it hard to concentrate    Irritability    Tiredness and fatigue during the day  Home care  1. Review your medicines with your doctor or pharmacist to find out if they can cause insomnia. Not all medicines will affect your sleep, but they shouldn't be stopped without reviewing them with your doctor. There may be serious side effects and consequences from suddenly stopping your medicines. Not taking them may cause strokes, heart attacks, and many other problems.  2. Caffeine, smoking and alcohol also affect sleep. Limit your daily use and do not use these before bedtime. Alcohol may make you sleepy at first, but as its effects wear off, you may awaken a few hours later and have trouble returning to sleep.  3. Do not exercise, eat or drink large amounts of liquid within 2 hours of your bedtime.  4. Improve your sleep habits. Have a fixed bed and wake-up time. Try to keep noise, light and heat in your bedroom at a comfortable level. Try using earplugs or eyeshades if needed.   5. Avoid watching TV in bed.  6. If you do not fall asleep within 30 minutes, try to relax by reading or listening to soft music.  7. Limit daytime napping to one 30 minute period, early in the day.  8. Get  regular exercise. Find other ways to lessen your stress level.  9. If a medicine was prescribed to help reset your sleep patterns, take it as directed. Sleeping pills are intended for short-term use, only. If taken for too long, the effect wears off while the risk of physical addiction and psychological dependence increases.  Sleep diary  If the cause isn t obvious and it is not improving, try keeping a  sleep diary  for a couple of weeks. Include in it:    The time you go to bed    How long it takes to fall asleep    How many times you wake up    What time you wake up    Your meal times and what you eat    What time you drink alcohol    Your exercise habits and times  Follow-up care  Follow up with your healthcare provider, or as advised. If X-rays or CT scans were done, you will be notified if there is a change in the reading, especially if it affects treatment.  Call 911  Call 911 if any of these occur:    Trouble breathing    Confusion or trouble waking    Fainting or loss of consciousness    Rapid heart rate    New chest, arm, shoulder, neck or upper back pain    Trouble with speech or vision, weakness of an arm or leg    Trouble walking or talking, loss of balance, numbness or weakness in one side of your body, facial droop  When to seek medical advice  Call your healthcare provider right away if any of these occur:    Extreme restlessness or irritability    Confusion or hallucinations (seeing or hearing things that are not there)    Anxiety, depression    Several days without sleeping  Date Last Reviewed: 11/19/2015 2000-2017 [employer]. [address] [place] [address], [place] [address]. All rights reserved. This information is not intended as a substitute for professional medical care. Always follow your healthcare professional's instructions.

## 2017-10-31 NOTE — PROGRESS NOTES
HPI    SUBJECTIVE:   Mervin Pacheco is a 22 year old female who presents to clinic today for the following health issues:    Tinnitus  Pt comes in clinic today to discuss an ear problem, she states her ears have been ringing for the past 3 weeks. It started when she got sick about 3 weeks ago with a cold and hasn't resolved. She states that this is the first time it's ever happened. She denies any other symptoms besides a runny nose. Most other cold symptoms have resolved.    Tinnitus is high pitched and intermittent, mostly occurs in AM and at night. It is non-pulsatile. Denies ear pain, hearing loss, dizziness, jaw pain, neck pain, HA, fever/chills, or syncope. Tried warm water & hydrogen peroxide rinses but this didn't help.    Insomnia  She would also like to discuss insomnia. She states this has been going on for a little over a month. She states that she falls asleep fine at about 1030pm (30 mins after lying down) and wakes up at 2:00am. She then stays awake until 530am and then alarm goes off at 6:00am. She states this happened last November.    Last year it was related to stress of starting school. Is in school and working full time, also planning a wedding, admits this may be related. No hx depression or anxiety, does not snore. Naps once every 2 weeks for about 45 min. When she can't fall back asleep she tries listening to music. Does not drink caffeine; only exercise in AM.      Chart Review:  No flowsheet data found.  No flowsheet data found.    There is no problem list on file for this patient.    Past Surgical History:   Procedure Laterality Date     APPENDECTOMY      4/2002     Family History   Problem Relation Age of Onset     Hypertension Father      Hyperlipidemia Father      DIABETES Father      Stomach Cancer Maternal Grandmother      Breast Cancer Maternal Aunt       Social History   Substance Use Topics     Smoking status: Never Smoker     Smokeless tobacco: Not on file     Alcohol use Yes       Comment: 1-2 per week        Problem list, Medication list, Allergies, Medical/Social/Surg hx reviewed in [facility], updated as appropriate.      Review of Systems   Constitutional: Negative for chills and fever.   HENT: Positive for tinnitus. Negative for ear pain.         Rhinorrhea   Respiratory: Negative for shortness of breath.    Cardiovascular: Negative for chest pain.   Gastrointestinal: Negative for abdominal pain, diarrhea, nausea and vomiting.   Skin: Negative for rash.   Neurological: Negative for focal weakness and headaches.   Psychiatric/Behavioral: The patient has insomnia.    All other systems reviewed and are negative.        Physical Exam   Constitutional: She is oriented to person, place, and time and well-developed, well-nourished, and in no distress.   HENT:   Head: Normocephalic and atraumatic.   Right Ear: Tympanic membrane, external ear and ear canal normal.   Left Ear: Tympanic membrane, external ear and ear canal normal.   Nose: Nose normal.   Mouth/Throat: Uvula is midline, oropharynx is clear and moist and mucous membranes are normal.   No TMJ tenderness or swelling, no clicking or popping   Cardiovascular: Normal rate, regular rhythm and normal heart sounds.    Pulmonary/Chest: Effort normal and breath sounds normal.   Musculoskeletal: Normal range of motion.   Neurological: She is alert and oriented to person, place, and time. Gait normal.   Skin: Skin is warm and dry.   Psychiatric: Mood and affect normal.   Nursing note and vitals reviewed.    Vital Signs  /63  Pulse 66  Temp 97.9  F (36.6  C) (Oral)  Resp 14  Wt 153 lb (69.4 kg)  LMP 10/17/2017  SpO2 98%  BMI 25.46 kg/m2   Body mass index is 25.46 kg/(m^2).    Diagnostic Test Results:  none     ASSESSMENT/PLAN:                                                        ICD-10-CM    1. Tinnitus, bilateral H93.13    2. Insomnia, unspecified type G47.00 zolpidem (AMBIEN) 5 MG tablet     Tinnitus is intermittent and non-pulsatile,  in setting of recent URI. Suspect related to eustachian tube dysfunction, do not suspect vascular or neurologic cause. Recommended Flonase or Nasonex. F/u if persists after another 1-2 weeks or if becomes constant or pulsatile.    Discussed options for insomnia including seeing a therapist, working on sleep hygiene, or medication. She was given Ambien last year which was helpful for her and would like to try this again. Also discussed good sleep habits, and emphasized getting out of bed and doing a relaxing activity instead of lying in bed when she can't fall back asleep.    I have discussed any lab or imaging results, the patient's diagnosis, and my plan of treatment with the patient and/or family. Patient is aware to come back in if with worsening symptoms or if no relief despite treatment plan.  Patient voiced understanding and had no further questions.       Follow Up: Data Unavailable    SHIV Thomas, PA-C  [facility]

## 2017-10-31 NOTE — MR AVS SNAPSHOT
After Visit Summary   10/31/2017    Mervin Pacheco    MRN: [id number]           Patient Information     Date Of Birth          1995        Visit Information        Provider Department      10/31/2017 2:00 PM Anahy Gary PA-C [facility]        Today's Diagnoses     Tinnitus, bilateral    -  1    Insomnia, unspecified type          Care Instructions    Try Flonase or Nasonex for the ringing in the ears.      Tinnitus (Ringing in the Ears)     Treatment may include maskers and hearing aids.     Tinnitus is the term for a noise in your ear not caused by an outside sound. The noise might be a ringing, clicking, hiss, or roar. It can vary in pitch and may be soft or quite loud. For some people, tinnitus is a minor nuisance. But for others, the noise can make it hard to hear, work, and even sleep. When tinnitus can't be cured, a number of treatments may offer relief.  What causes tinnitus?  Loud noises, hearing loss, and ear wax can cause tinnitus. So can certain medicines. Large amounts of aspirin or caffeine are sometimes to blame. In many cases, the exact cause of tinnitus is unknown.  How is tinnitus treated?  Identifying and removing the cause is the best way to treat tinnitus. For that reason, your healthcare provider may refer you to an otolaryngologist (ear, nose, and throat doctor). Your hearing may also be checked by an audiologist (hearing specialist). If you have hearing loss, wearing a hearing aid may help your tinnitus. When the cause can't be found, the tinnitus itself may be treated. Some of the treatments are listed below, and your healthcare provider can tell you more about them:    Maskers are small devices that look like hearing aids. They emit a pleasant sound that helps cover up the ringing in your ears. Hearing aids and maskers are sometimes used together.    Medicines that treat anxiety and depression may ease tinnitus in some people.    Hypnosis or  relaxation therapy may help head noise seem less severe.    Tinnitus retraining therapy combines counseling and maskers. Both can help take your mind off the tinnitus.  For more information    American Speech-Hearing-Language Association [phone] www.purnima.org    American Tinnitus Association [phone] www.marilu.org    National [place] on Deafness and other Communication Disorders [phone] www.nidcd.nih.gov   Date Last Reviewed: 7/1/2016 2000-2017 [employer]. [address] [place] [address]. All rights reserved. This information is not intended as a substitute for professional medical care. Always follow your healthcare professional's instructions.        Insomnia  Insomnia is repeated difficulty going to sleep or staying asleep, or both. Whether you have insomnia is not defined by a specific amount of sleep. Different people need different amounts of sleep, and you may need more or less sleep at different times of your life.  There are 3 major types of insomnia:  short-term, chronic, and  other.   Short-term, or acute insomnia lasts less than 3 months.  The symptoms are temporary and can be linked directly to a stressor, such as the death of a loved one, financial problems, or a new physical problem.  Short-term insomnia stops when the stressor resolves or the person adapts to its presence.  Chronic insomnia occurs at least 3 times a week and lasts longer than 3 months.  Chronic insomnia can occur when either the cause of the sleeping problem is not clear, or the insomnia does not get better when the stressor is resolved. A number of other criteria are also used to make the diagnosis of chronic insomnia.    Other insomnia  is the third type of insomnia-related sleep disorders.  This description applies to people who have problems getting to sleep or staying asleep, but do not meet all of the factors that describe either short-term or chronic insomnia.    Many things cause  insomnia. Different people may have different causes. It can be from an underlying medical or psychological condition, or lifestyle. It can also be primary insomnia, which means no cause can be found.  Causes of insomnia include:    Chronic medical problems- heart disease, gastrointestinal problems, hormonal changes, breathing problems    Anxiety    Stress    Depression    Pain    Work schedule    Sleep apnea    Illegal drugs    Certain medicines  Many different medidcines can affect your sleep, such as stimulants, caffeine, alcohol, some decongestants, and diet pills. Other medicines may include some types of blood pressure pills, steroids, asthma medicines, antihistamines, antidepressants, seizure medicines and statins. Not all of these will affect your sleep, and they shouldn t be stopped without talking to your doctor.  Symptoms of insomnia can include:    Lying awake for long periods at night before falling asleep    Waking up several times during the night    Waking up early in the morning and not being able to get back to sleep    Feeling tired and not refreshed by sleep    Not being able to function properly during the day and finding it hard to concentrate    Irritability    Tiredness and fatigue during the day  Home care  1. Review your medicines with your doctor or pharmacist to find out if they can cause insomnia. Not all medicines will affect your sleep, but they shouldn't be stopped without reviewing them with your doctor. There may be serious side effects and consequences from suddenly stopping your medicines. Not taking them may cause strokes, heart attacks, and many other problems.  2. Caffeine, smoking and alcohol also affect sleep. Limit your daily use and do not use these before bedtime. Alcohol may make you sleepy at first, but as its effects wear off, you may awaken a few hours later and have trouble returning to sleep.  3. Do not exercise, eat or drink large amounts of liquid within 2 hours of  your bedtime.  4. Improve your sleep habits. Have a fixed bed and wake-up time. Try to keep noise, light and heat in your bedroom at a comfortable level. Try using earplugs or eyeshades if needed.   5. Avoid watching TV in bed.  6. If you do not fall asleep within 30 minutes, try to relax by reading or listening to soft music.  7. Limit daytime napping to one 30 minute period, early in the day.  8. Get regular exercise. Find other ways to lessen your stress level.  9. If a medicine was prescribed to help reset your sleep patterns, take it as directed. Sleeping pills are intended for short-term use, only. If taken for too long, the effect wears off while the risk of physical addiction and psychological dependence increases.  Sleep diary  If the cause isn t obvious and it is not improving, try keeping a  sleep diary  for a couple of weeks. Include in it:    The time you go to bed    How long it takes to fall asleep    How many times you wake up    What time you wake up    Your meal times and what you eat    What time you drink alcohol    Your exercise habits and times  Follow-up care  Follow up with your healthcare provider, or as advised. If X-rays or CT scans were done, you will be notified if there is a change in the reading, especially if it affects treatment.  Call 911  Call 911 if any of these occur:    Trouble breathing    Confusion or trouble waking    Fainting or loss of consciousness    Rapid heart rate    New chest, arm, shoulder, neck or upper back pain    Trouble with speech or vision, weakness of an arm or leg    Trouble walking or talking, loss of balance, numbness or weakness in one side of your body, facial droop  When to seek medical advice  Call your healthcare provider right away if any of these occur:    Extreme restlessness or irritability    Confusion or hallucinations (seeing or hearing things that are not there)    Anxiety, depression    Several days without sleeping  Date Last Reviewed:  11/19/2015 2000-2017 [employer]. [address] [place] [address], [place] [address]. All rights reserved. This information is not intended as a substitute for professional medical care. Always follow your healthcare professional's instructions.                Follow-ups after your visit        Who to contact     If you have questions or need follow up information about today's clinic visit or your schedule please contact [facility] directly at [phone].  Normal or non-critical lab and imaging results will be communicated to you by MyChart, letter or phone within 4 business days after the clinic has received the results. If you do not hear from us within 7 days, please contact the clinic through taggahart or phone. If you have a critical or abnormal lab result, we will notify you by phone as soon as possible.  Submit refill requests through Surfingbird or call your pharmacy and they will forward the refill request to us. Please allow 3 business days for your refill to be completed.          Additional Information About Your Visit        taggaharDirectRM Information     Surfingbird gives you secure access to your electronic health record. If you see a primary care provider, you can also send messages to your care team and make appointments. If you have questions, please call your primary care clinic.  If you do not have a primary care provider, please call [phone] and they will assist you.        Care EveryWhere ID     This is your Care EveryWhere ID. This could be used by other organizations to access your [place] medical records  [id number]        Your Vitals Were     Pulse Temperature Respirations Last Period Pulse Oximetry BMI (Body Mass Index)    66 97.9  F (36.6  C) (Oral) 14 10/17/2017 98% 25.46 kg/m2       Blood Pressure from Last 3 Encounters:   10/31/17 125/63   05/19/17 122/86   05/17/17 122/60    Weight from Last 3 Encounters:   10/31/17 153 lb (69.4 kg)   05/19/17 162 lb (73.5 kg)    05/17/17 150 lb (68 kg)              Today, you had the following     No orders found for display       Primary Care Provider Office Phone # Fax #    Deqa Ventura Figueroa [phone] [address]  [facility] [address]        Equal Access to Services     [facility] WOOD : Hadii aad ku hadasho Soomaali, waaxda luqadaha, qaybta kaalmada adeegyada, waxay idiin hayaan adeeg lucielane larafaln ah. So [facility] [phone].    ATENCIÓN: Si habla español, tiene a mejía disposición servicios gratuitos de asistencia lingüística. [facility] [phone].    We comply with applicable federal civil rights laws and [place] laws. We do not discriminate on the basis of race, color, national origin, age, disability, sex, sexual orientation, or gender identity.            Thank you!     Thank you for choosing [facility]  for your care. Our goal is always to provide you with excellent care. Hearing back from our patients is one way we can continue to improve our services. Please take a few minutes to complete the written survey that you may receive in the mail after your visit with us. Thank you!             Your Updated Medication List - Protect others around you: Learn how to safely use, store and throw away your medicines at www.disposemymeds.org.          This list is accurate as of: 10/31/17  2:23 PM.  Always use your most recent med list.                   Brand Name Dispense Instructions for use Diagnosis    etonogestrel-ethinyl estradiol 0.12-0.015 MG/24HR vaginal ring    NUVARING    3 each    Insert 1 ring vaginally every 21 days then remove for 1 week then repeat with new ring.    Encounter for initial prescription of vaginal ring hormonal contraceptive

## 2017-10-31 NOTE — NURSING NOTE
"Chief Complaint   Patient presents with     Ear Problem       Initial /63  Pulse 66  Temp 97.9  F (36.6  C) (Oral)  Resp 14  Wt 153 lb (69.4 kg)  LMP 10/17/2017  SpO2 98%  BMI 25.46 kg/m2 Estimated body mass index is 25.46 kg/(m^2) as calculated from the following:    Height as of 5/19/17: 5' 5\" (1.651 m).    Weight as of this encounter: 153 lb (69.4 kg).  Medication Reconciliation: complete     Haley Greene MA    "

## 2017-11-10 ENCOUNTER — OFFICE VISIT (OUTPATIENT)
Dept: FAMILY MEDICINE | Facility: CLINIC | Age: 22
End: 2017-11-10
Payer: COMMERCIAL

## 2017-11-10 VITALS
BODY MASS INDEX: 25.36 KG/M2 | WEIGHT: 152.4 LBS | OXYGEN SATURATION: 95 % | HEART RATE: 77 BPM | SYSTOLIC BLOOD PRESSURE: 113 MMHG | TEMPERATURE: 98.1 F | RESPIRATION RATE: 23 BRPM | DIASTOLIC BLOOD PRESSURE: 58 MMHG

## 2017-11-10 DIAGNOSIS — H93.13 TINNITUS, BILATERAL: Primary | ICD-10-CM

## 2017-11-10 PROCEDURE — 99213 OFFICE O/P EST LOW 20 MIN: CPT | Performed by: NURSE PRACTITIONER

## 2017-11-10 NOTE — PATIENT INSTRUCTIONS
Tinnitus (Ringing in the Ears)     Treatment may include maskers and hearing aids.     Tinnitus is the term for a noise in your ear not caused by an outside sound. The noise might be a ringing, clicking, hiss, or roar. It can vary in pitch and may be soft or quite loud. For some people, tinnitus is a minor nuisance. But for others, the noise can make it hard to hear, work, and even sleep. When tinnitus can't be cured, a number of treatments may offer relief.  What causes tinnitus?  Loud noises, hearing loss, and ear wax can cause tinnitus. So can certain medicines. Large amounts of aspirin or caffeine are sometimes to blame. In many cases, the exact cause of tinnitus is unknown.  How is tinnitus treated?  Identifying and removing the cause is the best way to treat tinnitus. For that reason, your healthcare provider may refer you to an otolaryngologist (ear, nose, and throat doctor). Your hearing may also be checked by an audiologist (hearing specialist). If you have hearing loss, wearing a hearing aid may help your tinnitus. When the cause can't be found, the tinnitus itself may be treated. Some of the treatments are listed below, and your healthcare provider can tell you more about them:    Maskers are small devices that look like hearing aids. They emit a pleasant sound that helps cover up the ringing in your ears. Hearing aids and maskers are sometimes used together.    Medicines that treat anxiety and depression may ease tinnitus in some people.    Hypnosis or relaxation therapy may help head noise seem less severe.    Tinnitus retraining therapy combines counseling and maskers. Both can help take your mind off the tinnitus.  For more information    American Speech-Hearing-Language Association [phone] www.purnima.org    American Tinnitus Association [phone] www.marilu.org    National [place] on Deafness and other Communication Disorders [phone] www.nidcd.nih.gov   Date Last Reviewed: 7/1/2016     2586-7841 [employer]. [address] [place] [address], [place] [address]. All rights reserved. This information is not intended as a substitute for professional medical care. Always follow your healthcare professional's instructions.

## 2017-11-10 NOTE — NURSING NOTE
"Chief Complaint   Patient presents with     Tinnitus       Initial /58  Pulse 77  Temp 98.1  F (36.7  C) (Oral)  Resp 23  Wt 152 lb 6.4 oz (69.1 kg)  LMP 11/10/2017 (Exact Date)  SpO2 95%  BMI 25.36 kg/m2 Estimated body mass index is 25.36 kg/(m^2) as calculated from the following:    Height as of 5/19/17: 5' 5\" (1.651 m).    Weight as of this encounter: 152 lb 6.4 oz (69.1 kg).  Medication Reconciliation: complete    Ranjit Forrest CMA   "

## 2017-11-10 NOTE — MR AVS SNAPSHOT
After Visit Summary   11/10/2017    Mervin Pacheco    MRN: [id number]           Patient Information     Date Of Birth          1995        Visit Information        Provider Department      11/10/2017 2:20 PM Natasha Hong APRN [facility]        Today's Diagnoses     Tinnitus, bilateral    -  1      Care Instructions      Tinnitus (Ringing in the Ears)     Treatment may include maskers and hearing aids.     Tinnitus is the term for a noise in your ear not caused by an outside sound. The noise might be a ringing, clicking, hiss, or roar. It can vary in pitch and may be soft or quite loud. For some people, tinnitus is a minor nuisance. But for others, the noise can make it hard to hear, work, and even sleep. When tinnitus can't be cured, a number of treatments may offer relief.  What causes tinnitus?  Loud noises, hearing loss, and ear wax can cause tinnitus. So can certain medicines. Large amounts of aspirin or caffeine are sometimes to blame. In many cases, the exact cause of tinnitus is unknown.  How is tinnitus treated?  Identifying and removing the cause is the best way to treat tinnitus. For that reason, your healthcare provider may refer you to an otolaryngologist (ear, nose, and throat doctor). Your hearing may also be checked by an audiologist (hearing specialist). If you have hearing loss, wearing a hearing aid may help your tinnitus. When the cause can't be found, the tinnitus itself may be treated. Some of the treatments are listed below, and your healthcare provider can tell you more about them:    Maskers are small devices that look like hearing aids. They emit a pleasant sound that helps cover up the ringing in your ears. Hearing aids and maskers are sometimes used together.    Medicines that treat anxiety and depression may ease tinnitus in some people.    Hypnosis or relaxation therapy may help head noise seem less severe.    Tinnitus retraining  therapy combines counseling and maskers. Both can help take your mind off the tinnitus.  For more information    American Speech-Hearing-Language Association [phone] www.purnima.org    American Tinnitus Association [phone] www.marilu.org    National [place] on Deafness and other Communication Disorders [phone] www.nidcd.nih.gov   Date Last Reviewed: 7/1/2016 2000-2017 [employer] [address] [place] [address]. All rights reserved. This information is not intended as a substitute for professional medical care. Always follow your healthcare professional's instructions.                Follow-ups after your visit        Additional Services     OTOLARYNGOLOGY REFERRAL       Your provider has referred you to:   [facility]: [facility] (Otolaryngology) - [place] [phone]  [email or web address]  N: [facility] [phone]   [email or web address]    Please be aware that coverage of these services is subject to the terms and limitations of your health insurance plan.  Call member services at your health plan with any benefit or coverage questions.      Please bring the following with you to your appointment:    (1) Any X-Rays, CTs or MRIs which have been performed.  Contact the facility where they were done to arrange for  prior to your scheduled appointment.   (2) List of current medications  (3) This referral request   (4) Any documents/labs given to you for this referral                  Who to contact     If you have questions or need follow up information about today's clinic visit or your schedule please contact [facility] directly at [phone].  Normal or non-critical lab and imaging results will be communicated to you by MyChart, letter or phone within 4 business days after the clinic has received  the results. If you do not hear from us within 7 days, please contact the clinic through Acumen or phone. If you have a critical or abnormal lab result, we will notify you by phone as soon as possible.  Submit refill requests through Acumen or call your pharmacy and they will forward the refill request to us. Please allow 3 business days for your refill to be completed.          Additional Information About Your Visit        [email or web address] Information     Acumen gives you secure access to your electronic health record. If you see a primary care provider, you can also send messages to your care team and make appointments. If you have questions, please call your primary care clinic.  If you do not have a primary care provider, please call [phone] and they will assist you.        Care EveryWhere ID     This is your Care EveryWhere ID. This could be used by other organizations to access your [place] medical records  [id number]        Your Vitals Were     Pulse Temperature Respirations Last Period Pulse Oximetry BMI (Body Mass Index)    77 98.1  F (36.7  C) (Oral) 23 11/10/2017 (Exact Date) 95% 25.36 kg/m2       Blood Pressure from Last 3 Encounters:   11/10/17 113/58   10/31/17 125/63   05/19/17 122/86    Weight from Last 3 Encounters:   11/10/17 152 lb 6.4 oz (69.1 kg)   10/31/17 153 lb (69.4 kg)   05/19/17 162 lb (73.5 kg)              We Performed the Following     OTOLARYNGOLOGY REFERRAL        Primary Care Provider Office Phone # Fax #    Deqa Ventura Figueroa [phone] [address]  [facility] [address]        Equal Access to Services     [facility] AH: Hadii keily Benites, aly barillas, qaizzy angeloalifrah sawant. So [facility] [phone].    ATENCIÓN: Si habla español, tiene a mejía disposición servicios gratuitos de asistencia lingüística. Ernestina al [phone].    We comply with applicable federal civil rights laws and [place]  laws. We do not discriminate on the basis of race, color, national origin, age, disability, sex, sexual orientation, or gender identity.            Thank you!     Thank you for choosing [facility]  for your care. Our goal is always to provide you with excellent care. Hearing back from our patients is one way we can continue to improve our services. Please take a few minutes to complete the written survey that you may receive in the mail after your visit with us. Thank you!             Your Updated Medication List - Protect others around you: Learn how to safely use, store and throw away your medicines at www.disposemymeds.org.          This list is accurate as of: 11/10/17  2:59 PM.  Always use your most recent med list.                   Brand Name Dispense Instructions for use Diagnosis    etonogestrel-ethinyl estradiol 0.12-0.015 MG/24HR vaginal ring    NUVARING    3 each    Insert 1 ring vaginally every 21 days then remove for 1 week then repeat with new ring.    Encounter for initial prescription of vaginal ring hormonal contraceptive       zolpidem 5 MG tablet    AMBIEN    14 tablet    Take 1 tablet (5 mg) by mouth nightly as needed for sleep    Insomnia, unspecified type

## 2017-11-10 NOTE — PROGRESS NOTES
SUBJECTIVE:   Mervin Pacheco is a 22 year old female who presents to clinic today for the following health issues:  Chief Complaint   Patient presents with     Tinnitus     Ringing In Ears x1m  Mervin reports that her ears started ringing on October 1-2. By October 13th she developed a URI.  The URI lasted 3 days and the runny nose/drainage lasted until approximately November 2.  Today, the ringing in her ears continues.  10-12 hours a day she will notice the ringing.  More pronounced early in the morning and at night.  No headaches, dizziness.  Flonase was recommended for her ear ringing before. It caused headaches and did not help. She stopped the flonase.      She does not take OTC NSAIDS.    She denies fever or chills.  No current URI symptoms.  No history of allergies.    Problem list and histories reviewed & adjusted, as indicated.  Additional history: as documented    There is no problem list on file for this patient.    Past Surgical History:   Procedure Laterality Date     APPENDECTOMY      4/2002       Social History   Substance Use Topics     Smoking status: Never Smoker     Smokeless tobacco: Not on file     Alcohol use Yes      Comment: 1-2 per week     Family History   Problem Relation Age of Onset     Hypertension Father      Hyperlipidemia Father      DIABETES Father      Stomach Cancer Maternal Grandmother      Breast Cancer Maternal Aunt          Current Outpatient Prescriptions   Medication Sig Dispense Refill     zolpidem (AMBIEN) 5 MG tablet Take 1 tablet (5 mg) by mouth nightly as needed for sleep 14 tablet 0     etonogestrel-ethinyl estradiol (NUVARING) 0.12-0.015 MG/24HR vaginal ring Insert 1 ring vaginally every 21 days then remove for 1 week then repeat with new ring. 3 each 3     No Known Allergies  Recent Labs   Lab Test  11/07/16   1810   LDL  70   HDL  57   TRIG  74      BP Readings from Last 3 Encounters:   11/10/17 113/58   10/31/17 125/63   05/19/17 122/86    Wt Readings from Last  3 Encounters:   11/10/17 152 lb 6.4 oz (69.1 kg)   10/31/17 153 lb (69.4 kg)   05/19/17 162 lb (73.5 kg)            Labs reviewed in EPIC    Reviewed and updated as needed this visit by clinical staff       Reviewed and updated as needed this visit by Provider         ROS:  Constitutional, HEENT, cardiovascular, pulmonary, GI, , musculoskeletal, neuro, skin, endocrine and psych systems are negative, except as otherwise noted.      OBJECTIVE:   /58  Pulse 77  Temp 98.1  F (36.7  C) (Oral)  Resp 23  Wt 152 lb 6.4 oz (69.1 kg)  LMP 11/10/2017 (Exact Date)  SpO2 95%  BMI 25.36 kg/m2  Body mass index is 25.36 kg/(m^2).  EXAM:  Constitutional: healthy, alert, active and no distress  Neck: Neck supple. No adenopathy.  ENT: Bilateral TM's are normal.  Posterior oropharynx is clear.  Nares clear without congestion.  Cardiovascular: S1, S2  Respiratory: Respirations easy and regular. No respiratory distress. Lungs sounds CTA.  Skin: warm and dry  Psychiatric: mentation appears normal. and affect normal/bright      ASSESSMENT/PLAN:     (H93.13) Tinnitus, bilateral  (primary encounter diagnosis)  Comment: uncertain  Plan: OTOLARYNGOLOGY REFERRAL        She will continue healthy self cares.  She will follow up with ENT for a comprehensive ear exam and likely hearing exam.  She is appreciative.        OCTAVIO Lyons [facility]

## 2017-12-08 NOTE — TELEPHONE ENCOUNTER
APPT INFO    Date /Time: 12/21/17 at 9:30AM   Reason for Appt: Bilateral Tinnitus   Ref Provider/Clinic: Natasha Hong   Are there internal records? If yes, list: [facility]   Patient Contact (Y/N) & Call Details: No, referred   Action:

## 2017-12-10 ENCOUNTER — MYC REFILL (OUTPATIENT)
Dept: FAMILY MEDICINE | Facility: CLINIC | Age: 22
End: 2017-12-10

## 2017-12-10 DIAGNOSIS — Z30.015 ENCOUNTER FOR INITIAL PRESCRIPTION OF VAGINAL RING HORMONAL CONTRACEPTIVE: ICD-10-CM

## 2017-12-10 RX ORDER — ETONOGESTREL AND ETHINYL ESTRADIOL VAGINAL RING .015; .12 MG/D; MG/D
RING VAGINAL
Qty: 3 EACH | Refills: 3 | Status: CANCELLED | OUTPATIENT
Start: 2017-12-10

## 2017-12-11 NOTE — TELEPHONE ENCOUNTER
Message from MyChart:  Original authorizing provider: MD Mervin Sequeira would like a refill of the following medications:  etonogestrel-ethinyl estradiol (NUVARING) 0.12-0.015 MG/24HR vaginal ring [Veena Figueroa MD]    Preferred pharmacy: Other - [facility]- [address]    Comment:

## 2017-12-13 NOTE — TELEPHONE ENCOUNTER
Refill available at [facility].    Writer responded as per below.  EMMANUEL Cheney, RN          Requested Prescriptions   Pending Prescriptions Disp Refills     etonogestrel-ethinyl estradiol (NUVARING) 0.12-0.015 MG/24HR vaginal ring 3 each 3     Sig: Insert 1 ring vaginally every 21 days then remove for 1 week then repeat with new ring.    Contraceptives Protocol Passed    12/11/2017  6:59 AM       Passed - Patient is not a current smoker if age is 35 or older       Passed - Recent or future visit with authorizing provider's specialty    Patient had office visit in the last year or has a visit in the next 30 days with authorizing provider.  See chart review.              Passed - No active pregnancy on record       Passed - No positive pregnancy test in past 12 months

## 2017-12-21 ENCOUNTER — PRE VISIT (OUTPATIENT)
Dept: OTOLARYNGOLOGY | Facility: CLINIC | Age: 22
End: 2017-12-21

## 2018-02-07 ENCOUNTER — OFFICE VISIT (OUTPATIENT)
Dept: OTHER | Facility: OUTPATIENT CENTER | Age: 23
End: 2018-02-07
Payer: COMMERCIAL

## 2018-02-07 DIAGNOSIS — F52.6 GENITO-PELVIC PAIN/PENETRATION DISORDER: ICD-10-CM

## 2018-02-07 DIAGNOSIS — F52.22 FEMALE SEXUAL INTEREST/AROUSAL DISORDER, ACQUIRED, GENERALIZED, MILD: Primary | ICD-10-CM

## 2018-02-07 ASSESSMENT — ANXIETY QUESTIONNAIRES
GAD7 TOTAL SCORE: 2
7. FEELING AFRAID AS IF SOMETHING AWFUL MIGHT HAPPEN: NOT AT ALL
3. WORRYING TOO MUCH ABOUT DIFFERENT THINGS: NOT AT ALL
2. NOT BEING ABLE TO STOP OR CONTROL WORRYING: NOT AT ALL
5. BEING SO RESTLESS THAT IT IS HARD TO SIT STILL: NOT AT ALL
6. BECOMING EASILY ANNOYED OR IRRITABLE: SEVERAL DAYS
1. FEELING NERVOUS, ANXIOUS, OR ON EDGE: SEVERAL DAYS

## 2018-02-07 ASSESSMENT — PATIENT HEALTH QUESTIONNAIRE - PHQ9: 5. POOR APPETITE OR OVEREATING: NOT AT ALL

## 2018-02-07 NOTE — MR AVS SNAPSHOT
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, acquired, generalized, mild    -  1    Genito-pelvic pain/penetration disorder           Follow-ups after your visit        Your next 10 appointments already scheduled     Feb 22, 2018  3:00 PM CST   Family Therapy with Isha Bajwa, PhD   [facility] ([facility])    [address] [facility] [address]  Mail [address]  [facility] [address]   [phone]              Who to contact     Please call your clinic at [phone] to:    Ask questions about your health    Make or cancel appointments    Discuss your medicines    Learn about your test results    Speak to your doctor            Additional Information About Your Visit        MyChart Information     [employer] gives you secure access to your electronic health record. If you see a primary care provider, you can also send messages to your care team and make appointments. If you have questions, please call your primary care clinic.  If you do not have a primary care provider, please call [phone] and they will assist you.      [employer] is an electronic gateway that provides easy, online access to your medical records. With [employer], you can request a clinic appointment, read your test results, renew a prescription or communicate with your care team.     To access your existing account, please contact your [facility] or call [phone] for assistance.        Care EveryWhere ID     This is your Care EveryWhere ID. This could be used by other organizations to access your [facility] medical records  [id number]         Blood Pressure from Last 3 Encounters:   11/10/17 113/58    10/31/17 125/63   05/19/17 122/86    Weight from Last 3 Encounters:   11/10/17 69.1 kg (152 lb 6.4 oz)   10/31/17 69.4 kg (153 lb)   05/19/17 73.5 kg (162 lb)              We Performed the Following     Diagnostic Assessment (complete) [03466]        Primary Care Provider Office Phone # Fax #    Deqa Ventura Figueroa [phone] [address]  [facility] [address]        Equal Access to Services     [facility]: Hadii aad ku hadasho Soomaali, waaxda luqadaha, qaybta kaalmada adeegyada, ifrah miller . So [facility] [phone].    ATENCIÓN: Si habla español, tiene a mejía disposición servicios gratuitos de asistencia lingüística. [facility] [phone].    We comply with applicable federal civil rights laws and [place] laws. We do not discriminate on the basis of race, color, national origin, age, disability, sex, sexual orientation, or gender identity.            Thank you!     Thank you for choosing [facility]  for your care. Our goal is always to provide you with excellent care. Hearing back from our patients is one way we can continue to improve our services. Please take a few minutes to complete the written survey that you may receive in the mail after your visit with us. Thank you!             Your Updated Medication List - Protect others around you: Learn how to safely use, store and throw away your medicines at www.disposemymeds.org.          This list is accurate as of 2/7/18 11:59 PM.  Always use your most recent med list.                   Brand Name Dispense Instructions for use Diagnosis    etonogestrel-ethinyl estradiol 0.12-0.015 MG/24HR vaginal ring    NUVARING    3 each    Insert 1 ring vaginally every 21 days then remove for 1 week then repeat with new ring.    Encounter for initial prescription of vaginal ring hormonal contraceptive       zolpidem 5 MG tablet    AMBIEN    14 tablet    Take 1 tablet (5 mg) by mouth nightly as needed for  sleep    Insomnia, unspecified type

## 2018-02-07 NOTE — PROGRESS NOTES
"[facility]  Program in Human Sexuality  Department of Family Medicine & Community Health  [facility] [employer]  [address]  [place] [address]  Phone: [phone]  Fax: [phone]  [email or web address]      Relationship and Sex Therapy (REST) Diagnostic Assessment Interview     Date of Service: 2/07/18  Client Name: Mervin Pacheco  YOB: 1995  Age: 22 year old  MRN: [id number]  Gender/Gender Identity: Woman (she/her pronouns)  Sexual Orientation: Heterosexual  Treating Provider: Isha Bajwa, PhD, LP  Program: REST  Type of Session: Assessment  Present in Session: Client only  Number of Minutes: 60  Referral Source: Online search         Ethnicity:    _X_Caucasian  __   __Latino/  __Native American         __Asian American  __ Multi-racial: __ Other (Describe): __________________________________________________     Any relevant cultural issues? (Minority stress, immigration history, level of acculturation, social orientation, time orientation, verbal communication style, spiritual beliefs, etc.)  Mervin reported that she was raised [employer]orationism and attended [employer] school for 10 years, however has not identified as a current member of the [employer] hiren for about 6 years.                                            A description of the assessment process, the 50-55 minute hour, and client confidentiality was reviewed.       PRESENTING PROBLEM & GOALS  \"My partner and I experiencing low libido, pain during/after sex, extreme pain when semen is in contact with vagina. My low libido is relatively new.\"       History of Presenting Problem: Mervin reported that her partner, Miguelito, has always tended to have a lower sex drive than her and that, in year 2 or 3 of their dating history, he \"really wasn't interested in sex.\" The couple had recently moved in with one another and Mervin reported an expectation of engaging in more " "frequent sex. She indicated experiencing a much higher libido than her partner during this time period, in which she would have preferred daily sexual activity. She reported a history of frequent initiation and \"rejection\" by Miguelito also during this time. Mervin shared that the couple has completed the online [employer] course together and that this has resulted in some increased communication regarding sexual styles, libido, and initiation.     Within the last year, Mervin reported that her sexual desire has shifted and that she is now \"the person less interested.\" She reported uncertainty about precipitating factors, though did note beginning birth control (NuvaRing) in May 2017 and wondering about it's impact on desire. Mervin also reported that the couple moved to [place] about two years ago (from [place], [place]) so that Mervin could begin her graduate program, which she described as stressful but manageable. Mervin reported that her goals for treatment are: increased desire for sex, more communication during sex, and help understanding her physical pain from sex.       SEXUAL BEHAVIORS & FUNCTIONING  Mervin reported that, currently, she and Miguelito are sexual together about once every 2 weeks with initiation that is now about equal. She reported that her partner typically wants to shower before being sexual and that she will occasionally join him as part of their foreplay. She noted that: \"when I'm into it, I'm really into it\" though sexual desire has been difficult for her in recent months. Mervin noted that, ideally, she would like to engage in partnered sexual activity 2-3x/week.    Mervin shared that both she and Miguelito are \"really nervous about our bodies,\" including a focus (for her) on weight and her mid-section/\"tummy\". She reported a belief that body shame may relate to difficulty with sexual desire; for example, a sense of feeling like she should \"wait until I look better\" to have sex.     Masturbation: Mervin " "reported masturbating about once every 2 months, which is down from a history of about once/month. She stated that it often \"feels easier to put off\" due to having less desire recently. Mervin reported that she will typically utilize a vibrator during masturbation. She reported that she also used to watch female friendly pornography during masturbation before dating Miguelito and early in their relationship, however, has stopped doing so as she was uncomfortable with her partner watching pornography and felt it was fair for them both to stop. She indicated that she has never watched erotica/porn together with her partner, because \"he wasn't interested.\"      Sexual Desire/Interest/Arousal: See above regarding sexual desire. Mervin reported awareness of daily spontaneous desire in the past, which she believes now only occurs once every 1-2 weeks. She denied concerns about physical arousal, though noted that she tends to \"lose lubrication really quickly.\" The couple has experimented with several store-bought lubricants, including Good Clean Love.      Orgasmic Functioning: Mervin reported that during joint oral sex/vibrator use with her partner, she is able to reach orgasm 90% of the time. She reported that, historically, she could also reach orgasm via penetrative sex and now this \"never\" occurs. She reported the ability to reach orgasm during masturbation 100% of the time.      Sexual/Genital Pain: Mervin reported that she often experiences pain with penetrative partnered sex. She clarified that initial entry/penetration is not painful, however, she experiences a \"raw\" feeling and as if the interior of her vagina has \"caught on fire for a second\" when penetration stops, the penis is removed, and penetration is then resumed (e.g., when changing sexual positions). She reported a similar pattern with insertion of fingers into her vagina - pain if penetration is stopped and then restarted. Mervin reported that adding lubricants " "has not helped to alleviate these experiences of pain. She reported that, following penetration, the pain lasts about 5-10 minutes before fading.     See Medical History below, regarding potential semen protein allergy and related pain experiences. Mervin denied external, vulvar pain or experiencing pain with sitting, riding a bike, exterior genital touch, or tight clothing.          SEXUAL & RELATIONSHIP HISTORY  Mervin reported that her first partnered sexual experience occurred at age 16 with her long term, high school boyfriend, Speedy. She described the experience as \"not great,\" indicating that her boyfriend was \"pretty shameful about sex\" and experienced \"Yazdanism guilt.\" She shared, for example, that he would often \"get up and put his clothes on right away\" after sex.       Mervin indicated that she dated Speedy for 2 years ([id number]) before ending this relationship. She reported that her current partner, Miguelito, is her only other significant sexual partner. Mervin reported meeting Miguelito in college and that they have dated for just over 4 years. They are planning to be  in August 2018. She indicated that she is Miguelito's longest relationship. Mervin reported that she and Miguelito get along \"very well\" and have a \"strong and happy relationship\" in which both are \"willing to work together on problems.\"       Concurrent/Extra-relational Partnerships: Client denied.      Unpleasant/Traumatic Sexual Experiences: Mervin denied any history of sexual assault/abuse. She also denied any history of physical, emotional, verbal abuse, or experiences of domestic violence.      Same-Sex Experiences/Fantasies: Client denied.      Compulsive Sexuality: Mervin does not currently appear to be engaged in, or at risk for, compulsive sexual behaviors (e.g., obsessive fantasizing, compulsive masturbation, out of control pornography use, etc.).         MENTAL HEALTH HISTORY   Mervin reported that she attends individual psychotherapy " "(Jessica Laird, [facility]) every couple of months to address stress, anxiety, and transitions or \"big life moments.\" She reported a diagnosis of adjustment disorder, and noted that this provider is unaware of her current sexual concerns. Mervin denied any history of psychiatric hospitalizations or suicidal ideation, plan, or attempts. She is not currently taking any medication except birth control.    Mervin reported that she has experienced significant body shame since she was 8 years old. She noted that she has \"always been self-conscious\" and shared a belief that others see her as \"chubby but pretty\" based on a number of interpersonal interactions she has had. 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 Mervin shared recognizing what was happening (though being unsure why) and that she able to prepare for the panic attack. At the time of this assessment, she endorsed a minimal level of both depression and anxiety symptoms, including: anhedonia; difficulty with sleep; feeling nervous, anxious, or on edge; and irritability (see PHQ-9 & NURY-7 below):        PHQ-9:  PHQ-9 ([employer]) 2/7/2018   1.  Little interest or pleasure in doing things 1   2.  Feeling down, depressed, or hopeless 0   3.  Trouble falling or staying asleep, or sleeping too much 1   4.  Feeling tired or having little energy 0   5.  Poor appetite or overeating 0   6.  Feeling bad about yourself 0   7.  Trouble concentrating 0   8.  Moving slowly or restless 0   9.  Suicidal or self-harm thoughts 0   PHQ-9 Total Score 2     Interpretation of Total Score  Total Score Depression Severity and Recommendations   0-9 No Major " "Depression   10-14 Moderate.  Initial weekly follow up.  If patient is responding, monthly contacts.  Meds or therapy.   15-19 Moderate severe.  Initial weekly follow up.  If patient is responding, 2-4 week contacts.  Meds and/or therapy.   >20 Severe.  Weekly contact.  Meds and therapy.     NURY-7:  1. Feeling nervous, anxious, or on edge: Several days  2. Not being able to stop or control worrying: Not at all  3. Worrying too much about different things: Not at all  4. Trouble relaxing: Not at all  5. Being so restless that it is hard to sit still: Not at all  6. Becoming easily annoyed or irritable: Several days  7. Feeling afraid, as if something awful might happen: Not at all  NURY-7 Total Score: 2    Interpretation of Total Score  0 - 5     Minimal anxiety  6 - 10   Mild anxiety  11 - 15 Moderate anxiety  16 - 21 Severe anxiety        SUBSTANCE USE  Mervin reported that she consumes less than one alcoholic beverage a week and typically enjoys red wine. She denied the use of any tobacco products or caffeine. She denied the use of illicit or non-prescribed drugs.      CAGE-AID  Have you ever felt you should cut down on your drinking or drug use?: No  Have people annoyed you by criticizing your drinking or drug use?: No  Have you ever felt bad or guilty about your drinking or drug use?: No  Have you ever had a drink or used drugs first thing in the morning to steady your nerves or to get rid of a hangover? 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 She reported experiencing \"bad cramping,\" feeling overheated, genital swelling, and \"like something's in there that I need to get out\" when she has been in contact with semen. This has occurred with multiple sexual partners. Mervin noted that she has discussed the concern with her OB/GYN, who recommended that they discuss it more fully when " "Mervin decides to attempt pregnancy. Currently, she manages the allergy by having her partner \"pull out\" prior to ejaculation or wear a condom during penetrative sex.     Mervin reported no additional medical concerns or history of significant illness, disease, surgeries. She is not currently taking any medication except birth control.       Contraception Use: As noted above, Mervin started birth control (NuvaRing) in May 2017. She reported that, due to a severe reaction to semen, she and her partner also typically engage in \"pulling out\" behavior or he will wear a condom during penetrative sexual activity.        SOCIAL & FAMILY HISTORY     Social/Leisure: Mervin reported that she exercises 4-6x/week and also plays soccer once/week. She indicated that she enjoys spending time with her partner, playing board games, watching movies, and spending time outside. She reported many friends and an active social life.      Educational History: Mervin reported that she attended one year of college in [place] before transferring to [employer] in [place], [place]. Currently, she is completing her Master's degree in social work at the [employer]. She reported that she plans to graduate in May 2018.      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 Mervin noted dissatisfaction with finances, given student loans, cost of the upcoming wedding, and that her job will soon be ending when she graduates.      Family History: Mervin reported that she grew up in [place], as the middle of 3 children. Her parents were  in 2006 when she was age 9. Mervin indicated that her mother (age 58) has health issues related to obesity, and that her father (age 58) has controlled diabetes and \"undiagnosed anxiety.\" She noted " "\"secure attachment\" with both parents. Mervin reported that both of her siblings are in good mental and physical health. She noted that her older sister (age 30) recently moved to [place] and her younger brother (age 23) lives in [facility]. Mervin denied any history of substance abuse/chemical dependence in her immediate family members.     Children: Mervin does not currently have children, but reported that she would like to within the next 5-7 years.      LEGAL CONCERNS  None reported.      SAFETY CONCERNS  None reported.      _________________________________________________________________________  CONCLUSIONS     STRENGTHS & LIABILITIES  Mervin shared that she is patient with people and can handle a lot of stress. She described \"finances, waiting for things I want (like clothes), can be lazy, don't give out compliments easily\" as areas for growth. Regarding treatment, Mervin appears intelligent, open, and motivated to address her current concerns. Body shame will likely present a barrier to sexual health goals, and should be addressed consistently throughout treatment.        SYMPTOMS  Decreased sexual desire and frequency, difficulty with lubrication, sexual pain, \"extreme pain\" when semen is in contact with vagina; body shame; low self-esteem; anhedonia; difficulty with sleep; feeling nervous, anxious, or on edge; irritability     MENTAL STATUS  Appearance: Casually dressed, well groomed, appears stated age  Behavior/relationship to examiner/demeanor: Cooperative, engaged, pleasant  Orientation: Oriented to person, place, time and situation  Speech Rate: Normal  Speech Spontaneity: Normal  Mood: Calm, friendly, comfortable, pleasant  Affect: Appropriate/mood-congruent  Thought Process (Associations):  Logical, linear, goal directed  Thought Content: Denies suicidal ideation, intent or thoughts; Does not appear to be responding to internal stimuli  Attention/Concentration: Normal  Language: Intact  Insight: " "Adequate  Judgment: Good                                               PRELIMINARY DSM-5 (ICD-10-CM) DIAGNOSES        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 [facility] with her fiance to address several sexual concerns, including low sexual desire, difficulties with arousal, and a history of pain with penetrative sex.     Mervin reported that her partner, Miguelito, has historically had a lower sex drive than her and that, in year 2 or 3 of their dating history, he \"really wasn't interested in sex\" which led to her often feeling rejected. Within the last year, however, Mervin reported that her sexual desire has shifted and that she is now \"the person less interested.\" Mervin reported that her desire for both partnered sex and masturbation has been negatively impacted. She reported awareness of daily spontaneous desire in the past, which she believes now only occurs once every 1-2 weeks. Currently, she and Miguelito are sexual together about once every 2 weeks; ideally, she would like to engage in partnered sexual activity 2-3x/week. Mervin denied concerns about physical arousal, though noted that she tends to \"lose lubrication really quickly\" during partnered sex. She reported uncertainty about precipitating factors to her lowered sexual desire, though did note beginning birth control (NuvaRing) in May 2017 and wondering about it's impact. 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 Mervin " "shared that both she and Miguelito are \"really nervous about our bodies,\" including a focus (for her) on weight and her mid-section/\"tummy\". She reported a belief that longstanding difficulties with body shame and low self-esteem may relate to sexual desire; for example, a sense of feeling like she should \"wait until I look better\" to have sex.     Mervin reported that she often experiences pain with penetrative partnered sex. She clarified that initial entry/penetration is not painful, however, she experiences a \"raw\" feeling and as if the interior of her vagina has \"caught on fire for a second\" when penetration stops, the penis is removed, and penetration is then resumed (e.g., when changing sexual positions). She reported a similar pattern with insertion of fingers into her vagina. Lubricants have reportedly not been helpful. 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 She reported experiencing \"bad cramping,\" feeling overheated, genital swelling, and \"like something's in there that I need to get out\" when she has been in contact with semen. This has occurred with multiple sexual partners. Mervin noted that she has discussed the concern with her OB/GYN, who recommended that they discuss it more fully when Mervin decides to attempt pregnancy. Currently, she manages the allergy by having her partner \"pull out\" prior to ejaculation or wear a condom during penetrative sex.     At the time of this assessment, Mervin endorsed a minimal level of both depression and anxiety symptoms, including: anhedonia; difficulty with sleep; feeling nervous, anxious, or on edge; and irritability. Her reported treatment goals are: increased desire for sex, more communication during sex, and help understanding her physical pain from sex.     Based upon the current evaluation, the following recommendations are made:         (1) Complete REST program questionnaires to further " assess current symptoms and sexual history/functioning.      (2) Family therapy with a provider experienced in sexual health concerns to specifically address sexual desire, arousal, and pain difficulties, as well as the impact of relational dynamics on sexual functioning. (Of note: Insurance coverage only for 45-50 minute psychotherapy if attending individual sessions)      (3) Referral for sexual medicine evaluation with Dr. Angel Sutton/[facility] to further assess experiences of sexual pain/allergic reactions, and to explore potential medical options for treatment.       (4) Coordination of care between all providers, specifically Mervni's individual therapist. A release of information for verbal contact between providers will be discussed.        Isha Bajwa, PhD, LP  Licensed Psychologist

## 2018-02-08 ASSESSMENT — PATIENT HEALTH QUESTIONNAIRE - PHQ9: SUM OF ALL RESPONSES TO PHQ QUESTIONS 1-9: 2

## 2018-02-08 ASSESSMENT — ANXIETY QUESTIONNAIRES: GAD7 TOTAL SCORE: 2

## 2018-02-17 PROBLEM — F52.22: Status: ACTIVE | Noted: 2018-02-17

## 2018-02-17 PROBLEM — F52.6 GENITO-PELVIC PAIN/PENETRATION DISORDER: Status: ACTIVE | Noted: 2018-02-17

## 2018-02-22 ENCOUNTER — OFFICE VISIT (OUTPATIENT)
Dept: OTHER | Facility: OUTPATIENT CENTER | Age: 23
End: 2018-02-22
Payer: COMMERCIAL

## 2018-02-22 ENCOUNTER — TEAM CONFERENCE (OUTPATIENT)
Dept: OTHER | Facility: OUTPATIENT CENTER | Age: 23
End: 2018-02-22

## 2018-02-22 DIAGNOSIS — F52.22 FEMALE SEXUAL INTEREST/AROUSAL DISORDER, ACQUIRED, GENERALIZED, MILD: ICD-10-CM

## 2018-02-22 DIAGNOSIS — F52.6 GENITO-PELVIC PAIN/PENETRATION DISORDER: Primary | ICD-10-CM

## 2018-02-22 NOTE — TELEPHONE ENCOUNTER
"Medical/Psychiatric/Psyhological Consultation Form    Date:  2018     Name:  Mervin Pacheco   :  1995  MRN:  [id number]    To:  Dr. 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 See diagnostic intake dated 2018    Mervin reported that she often experiences pain with penetrative partnered sex. She clarified that initial entry/penetration is not painful, however, she experiences a \"raw\" feeling and as if the interior of her vagina has \"caught on fire for a second\" when penetration stops, the penis is removed, and penetration is then resumed (e.g., when changing sexual positions). She reported a similar pattern with insertion of fingers into her vagina. Lubricants have reportedly not been helpful. 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 She reported experiencing \"bad cramping,\" feeling overheated, genital swelling, and \"like something's in there that I need to get out\" when she has been in contact with semen. This has occurred with multiple sexual partners. Mervin reported that she has noted the concern to her OB/GYN, who recommended that they discuss it more fully when Mervin decides to attempt pregnancy. Currently, she manages the allergy by having her partner withdrawal prior to ejaculation or wear a condom during penetrative sex.     OK for Medical Student to sit in: Please ask patient        Isha Bajwa, PhD, LP  Licensed Psychologist        "

## 2018-02-22 NOTE — MR AVS SNAPSHOT
After Visit Summary   2/22/2018    Mervin Pacheco    MRN: [id number]           Patient Information     Date Of Birth          1995        Visit Information        Provider Department      2/22/2018 3:00 PM Isha Bajwa, PhD [facility]        Today's Diagnoses     Genito-pelvic pain/penetration disorder    -  1    Female sexual interest/arousal disorder, acquired, generalized, mild           Follow-ups after your visit        Your next 10 appointments already scheduled     Mar 08, 2018  2:00 PM CST   Family Therapy with Isha Bajwa, PhD   [facility] ([facility])    [address]  Mail [address] 7521  [facility] [address]   [phone]            Mar 22, 2018  1:00 PM CDT   Family Therapy with Isha Bajwa, PhD   [facility] ([facility])    [address]  [address]  [facility] [address]   [phone]            Apr 05, 2018  1:00 PM CDT   Family Therapy with Isha Bajwa, PhD   [facility] ([facility])    [address]  [address]  [facility] [address]   [phone]              Who to contact     Please call your clinic at [phone] to:    Ask questions about your health    Make or cancel appointments    Discuss your medicines    Learn about your test results    Speak to your doctor            Additional Information About Your Visit        MyChart Information     [employer] gives you secure access to your electronic health record. If you see a primary care provider, you can also send messages to your care team and make appointments. If you have questions, please call your primary care clinic.  If you do not have a primary care provider, please call [phone] and they will assist you.      [employer] is an electronic gateway that provides easy, online access to your medical records. With [employer], you can request a clinic appointment, read your  test results, renew a prescription or communicate with your care team.     To access your existing account, please contact your [facility] or call [phone] for assistance.        Care EveryWhere ID     This is your Care EveryWhere ID. This could be used by other organizations to access your [place] medical records  [id number]         Blood Pressure from Last 3 Encounters:   11/10/17 113/58   10/31/17 125/63   05/19/17 122/86    Weight from Last 3 Encounters:   11/10/17 69.1 kg (152 lb 6.4 oz)   10/31/17 69.4 kg (153 lb)   05/19/17 73.5 kg (162 lb)              We Performed the Following     Mental Health Tx Plan Scan (HIM Scan)     Psychotherapy Family/Couple with Patient [[id number]]        Primary Care Provider Office Phone # Fax #    Deqa Ventura Figueroa [phone]       [address]  [facility] [address]        Equal Access to Services     EDWARD MUIR : Hadii aad ku hadasho Soomaali, waaxda luqadaha, qaybta kaalmada adeegyada, waxay idiin haytanjan kya miller . So [facility] [phone].    ATENCIÓN: Si habla español, tiene a mejía disposición servicios gratuitos de asistencia lingüística. Llame al [phone].    We comply with applicable federal civil rights laws and [place] laws. We do not discriminate on the basis of race, color, national origin, age, disability, sex, sexual orientation, or gender identity.            Thank you!     Thank you for choosing [facility]  for your care. Our goal is always to provide you with excellent care. Hearing back from our patients is one way we can continue to improve our services. Please take a few minutes to complete the written survey that you may receive in the mail after your visit with us. Thank you!             Your Updated Medication List - Protect others around you: Learn how to safely use, store and throw away your medicines at www.disposemymeds.org.          This list is accurate as of  2/22/18 11:59 PM.  Always use your most recent med list.                   Brand Name Dispense Instructions for use Diagnosis    etonogestrel-ethinyl estradiol 0.12-0.015 MG/24HR vaginal ring    NUVARING    3 each    Insert 1 ring vaginally every 21 days then remove for 1 week then repeat with new ring.    Encounter for initial prescription of vaginal ring hormonal contraceptive       zolpidem 5 MG tablet    AMBIEN    14 tablet    Take 1 tablet (5 mg) by mouth nightly as needed for sleep    Insomnia, unspecified type

## 2018-02-26 NOTE — PROGRESS NOTES
Program in Human Sexuality  [facility]  [address], [address]  [place] [address]  Outpatient Progress Note      Session Date: 2/22/18  Client Name: Mervin Pacheco  YOB: 1995  MRN: [id number]  Provider: Isha Bajwa, PhD   Type of Session: Family therapy  Present in Session: Client and client's Miguelito kuo  Number of Minutes: 55   Treatment Plan Due: 02/22/2019      Current Symptoms/Status:   Mervin and her fianceMiguelito, are seeking family therapy to address several sexual concerns, including desire discrepancy in the relationship, difficulties with arousal, and a history of pain for Mervin with penetrative sex (and possible semen protein allergy). Mervin reports appearance related anxiety and noted that body shame and low self-esteem may relate to decreased sexual desire. Treatment goals include increased desire for and frequency of partnered sex, more communication during sex, and help understanding physical pain from sex.      Progress Toward Treatment Goals:   First session following diagnostic intake      Intervention & Response:   Interpersonal, client-centered, and CBT techniques utilized to address diagnostic feedback and treatment planning. Therapist and family reviewed preliminary diagnoses, treatment recommendations, and steps for contacting insurance to determine coverage of services for each partner. Focused in particular on referral to Dr. Angel Sutton for a sexual medicine evaluation to further assess Mervin's sexual pain/allergy experiences. Mervin reported willingness to pursue this recommendation and referral will be completed today. Assisted family in discussing that Mervin has not often talked about her sexual pain, which Miguelito indicated being surprised to learn happens so frequently for her. Both partners identified talking about sex as somewhat difficult for them to do, but a skill that has improved somewhat since they started an online [employer]  relationship course. Continued to explore the partner's dynamics with one another and discuss treatment planning. Formal Treatment Plans were completed together and signed (see chart). Family also signed verbal ROIs to allow provider to speak with each of them about treatment. For homework, both partners were asked to review the Sexual Health Model and related sexuality goals in order to discuss further next session. Mervin was observed to be more assertive, responsive, and talkative in session as compared to Miguelito. Reflected on how this pattern may relate to Miguelito's reported concerns about being unable to ask Mervin how she is feeling about various sexuality-related topics, and often experiencing anxiety about how she is feeling. Discussed beginning sessions on a biweekly basis, with regular reassessment as treatment goals are met. Family voiced agreement with this plan and, overall, was active and engaged throughout session.      Assignment:   (1). Family: Complete REST program questionnaires  (2). Family: Review Sexual Health Model and related therapeutic goals  (3). Mervin: Schedule sexual medicine evaluation when contacted by clinic       Diagnosis:   302.76 (F52.6) - Genito-pelvic pain/penetration disorder  302.72 (F52.22) - Female sexual interest/arousal disorder, acquired, generalized, mild     R/O - Adjustment disorder       Interactive Complexity:  N/A       Plan / Need for Future Services:    Return for family therapy in 2 weeks.             Isha Bajwa, PhD, LP  Licensed Psychologist

## 2018-03-08 ENCOUNTER — OFFICE VISIT (OUTPATIENT)
Dept: OTHER | Facility: OUTPATIENT CENTER | Age: 23
End: 2018-03-08
Payer: COMMERCIAL

## 2018-03-08 DIAGNOSIS — F52.22 FEMALE SEXUAL INTEREST/AROUSAL DISORDER, ACQUIRED, GENERALIZED, MILD: ICD-10-CM

## 2018-03-08 DIAGNOSIS — F52.6 GENITO-PELVIC PAIN/PENETRATION DISORDER: Primary | ICD-10-CM

## 2018-03-08 NOTE — MR AVS SNAPSHOT
After Visit Summary   3/8/2018    Mervin Pacheco    MRN: [id number]           Patient Information     Date Of Birth          1995        Visit Information        Provider Department      3/8/2018 2:00 PM Isha Bajwa, PhD [facility]        Today's Diagnoses     Genito-pelvic pain/penetration disorder    -  1    Female sexual interest/arousal disorder, acquired, generalized, mild           Follow-ups after your visit        Your next 10 appointments already scheduled     Apr 05, 2018 12:30 PM CDT   Family Therapy with Isha Bajwa PhD   [facility] ([facility])    [address]  Mail [address] 7521  [facility] [address]   [phone]            Apr 10, 2018 10:00 AM CDT   New Patient Visit with Angel Sutton MD   [facility] ([facility])    [address]  [address]  [facility] [address]   [phone]            May 03, 2018  1:00 PM CDT   Family Therapy with Isha Bajwa PhD   [facility] ([facility])    [address]  [address]  [facility] [address]   [phone]            May 17, 2018  2:00 PM CDT   Family Therapy with Isha Bajwa PhD   [facility] ([facility])    [address]  [address]  [facility] [address]   [phone]            May 31, 2018  1:00 PM CDT   Family Therapy with Isha Bajwa PhD   [facility] ([facility])    [address]  [address]  [facility] [address]   [phone]              Who to contact     Please call your clinic at [phone] to:    Ask questions about your health    Make or cancel appointments    Discuss your medicines    Learn about your test results    Speak to your doctor            Additional Information About Your Visit        MyChart Information     [employer] gives you secure access to your electronic health  record. If you see a primary care provider, you can also send messages to your care team and make appointments. If you have questions, please call your primary care clinic.  If you do not have a primary care provider, please call [phone] and they will assist you.      [employer] is an electronic gateway that provides easy, online access to your medical records. With [employer], you can request a clinic appointment, read your test results, renew a prescription or communicate with your care team.     To access your existing account, please contact your [facility] or call [phone] for assistance.        Care EveryWhere ID     This is your Care EveryWhere ID. This could be used by other organizations to access your [place] medical records  [id number]         Blood Pressure from Last 3 Encounters:   11/10/17 113/58   10/31/17 125/63   05/19/17 122/86    Weight from Last 3 Encounters:   11/10/17 69.1 kg (152 lb 6.4 oz)   10/31/17 69.4 kg (153 lb)   05/19/17 73.5 kg (162 lb)              We Performed the Following     Psychotherapy Family/Couple with Patient [[id number]]        Primary Care Provider Office Phone # Fax #    Deqa Ventura Figueroa [phone]       [address]  [facility] [place] [facility] [address]        Equal Access to Services     : Hadii aad ku hadasho Soomaali, waaxda luqadaha, qaybta kaalmada adeegyada, waxlaurel del valle hayclaudia miller . So [facility] [phone].    ATENCIÓN: Si habla español, tiene a mejía disposición servicios gratuitos de asistencia lingüística. Llame al [phone].    We comply with applicable federal civil rights laws and [place] laws. We do not discriminate on the basis of race, color, national origin, age, disability, sex, sexual orientation, or gender identity.            Thank you!     Thank you for choosing [facility] [facility]  for your care. Our goal is always to provide you with excellent care. Hearing back from our  patients is one way we can continue to improve our services. Please take a few minutes to complete the written survey that you may receive in the mail after your visit with us. Thank you!             Your Updated Medication List - Protect others around you: Learn how to safely use, store and throw away your medicines at www.disposemymeds.org.          This list is accurate as of 3/8/18 11:59 PM.  Always use your most recent med list.                   Brand Name Dispense Instructions for use Diagnosis    etonogestrel-ethinyl estradiol 0.12-0.015 MG/24HR vaginal ring    NUVARING    3 each    Insert 1 ring vaginally every 21 days then remove for 1 week then repeat with new ring.    Encounter for initial prescription of vaginal ring hormonal contraceptive       zolpidem 5 MG tablet    AMBIEN    14 tablet    Take 1 tablet (5 mg) by mouth nightly as needed for sleep    Insomnia, unspecified type

## 2018-03-22 ENCOUNTER — OFFICE VISIT (OUTPATIENT)
Dept: OTHER | Facility: OUTPATIENT CENTER | Age: 23
End: 2018-03-22
Payer: COMMERCIAL

## 2018-03-22 DIAGNOSIS — F52.22 FEMALE SEXUAL INTEREST/AROUSAL DISORDER, ACQUIRED, GENERALIZED, MILD: Primary | ICD-10-CM

## 2018-03-22 DIAGNOSIS — F52.6 GENITO-PELVIC PAIN/PENETRATION DISORDER: ICD-10-CM

## 2018-03-22 NOTE — MR AVS SNAPSHOT
After Visit Summary   3/22/2018    Mervin Pacheco    MRN: [id number]           Patient Information     Date Of Birth          1995        Visit Information        Provider Department      3/22/2018 1:00 PM Isha Bajwa, PhD [facility]        Today's Diagnoses     Female sexual interest/arousal disorder, acquired, generalized, mild    -  1    Genito-pelvic pain/penetration disorder           Follow-ups after your visit        Your next 10 appointments already scheduled     May 07, 2018 12:55 PM CDT   (Arrive by 12:40 PM)   New Allergy with Rony Delong MD   [facility] ([facility])    [address] [place] [address]  [address]  [facility] [id number] [phone]           Do not take anti-histamines or Zantac for seven day prior to your appointment.            May 17, 2018  2:00 PM CDT   Family Therapy with Isha Bajwa, PhD   [facility] ([facility])    [address]  Mail [address] 7521  [facility] [address]   [phone]            May 31, 2018  1:00 PM CDT   Family Therapy with Isha Bajwa, PhD   [facility] ([facility])    [address]  [address] 75[address]  [facility] [address]   [phone]            Jun 26, 2018 11:20 AM CDT   Return Visit with Angel Sutton MD   [facility] ([facility])    [address]  [address] 75[address]  [facility] [address]   [phone]              Who to contact     Please call your clinic at [phone] to:    Ask questions about your health    Make or cancel appointments    Discuss your medicines    Learn about your test results    Speak to your doctor            Additional Information About Your Visit        MyChart Information     [employer] gives you secure access to your electronic health record. If you see a primary care provider, you can also send messages to your  care team and make appointments. If you have questions, please call your primary care clinic.  If you do not have a primary care provider, please call [phone] and they will assist you.      [employer] is an electronic gateway that provides easy, online access to your medical records. With [employer], you can request a clinic appointment, read your test results, renew a prescription or communicate with your care team.     To access your existing account, please contact your [facility] or call [phone] for assistance.        Care EveryWhere ID     This is your Care EveryWhere ID. This could be used by other organizations to access your [place] medical records  [id number]         Blood Pressure from Last 3 Encounters:   04/10/18 119/75   11/10/17 113/58   10/31/17 125/63    Weight from Last 3 Encounters:   04/10/18 74.8 kg (165 lb)   11/10/17 69.1 kg (152 lb 6.4 oz)   10/31/17 69.4 kg (153 lb)              We Performed the Following     Psychotherapy Family/Couple with Patient [[id number]]        Primary Care Provider Office Phone # Fax #    Deqa Ventura Figueroa [phone]       [address]  [facility] [address]        Equal Access to Services     EDWARD MUIR : Hadii aad ku hadasho Soomaali, waaxda luqadaha, qaybta kaalmada adeegyada, waxay idiin hayclaudia rios ladayanara doll. So [facility] [phone].    ATENCIÓN: Si habla español, tiene a mejía disposición servicios gratuitos de asistencia lingüística. Ll[facility] [phone].    We comply with applicable federal civil rights laws and [place] laws. We do not discriminate on the basis of race, color, national origin, age, disability, sex, sexual orientation, or gender identity.            Thank you!     Thank you for choosing [facility]  for your care. Our goal is always to provide you with excellent care. Hearing back from our patients is one way we can continue to improve our services. Please take a few  minutes to complete the written survey that you may receive in the mail after your visit with us. Thank you!             Your Updated Medication List - Protect others around you: Learn how to safely use, store and throw away your medicines at www.disposemymeds.org.          This list is accurate as of 3/22/18 11:59 PM.  Always use your most recent med list.                   Brand Name Dispense Instructions for use Diagnosis    etonogestrel-ethinyl estradiol 0.12-0.015 MG/24HR vaginal ring    NUVARING    3 each    Insert 1 ring vaginally every 21 days then remove for 1 week then repeat with new ring.    Encounter for initial prescription of vaginal ring hormonal contraceptive       zolpidem 5 MG tablet    AMBIEN    14 tablet    Take 1 tablet (5 mg) by mouth nightly as needed for sleep    Insomnia, unspecified type

## 2018-03-29 NOTE — PROGRESS NOTES
Program in Human Sexuality  [facility]  [address], [address]  [place] [address]  Outpatient Progress Note      Session Date: 3/08/18  Client Name: Mervin Pacheco  YOB: 1995  MRN: [id number]  Provider: Isha Bajwa, PhD   Type of Session: Family therapy  Present in Session: Client and client's Miguelito kuo  Number of Minutes: 55   Treatment Plan Due: 02/22/2019      Current Symptoms/Status:   Mervin and her fiance, Miguelito, are seeking family therapy to address several sexual concerns, including desire discrepancy in the relationship, difficulties with arousal, and a history of pain for Mervin with penetrative sex (and possible semen protein allergy). Mervin reports appearance related anxiety and noted that body shame and low self-esteem may relate to decreased sexual desire. Treatment goals include increased desire for and frequency of partnered sex, more communication during sex, and help understanding physical pain from sex.      Progress Toward Treatment Goals:   Sexual medicine exam scheduled with Dr. Sutton; set boundaries around painful sex      Intervention & Response:   Interpersonal, client-centered, and CBT techniques utilized to address family's current status. Family reported that communication about sexual topics has increased since intake and feedback sessions. Mervin reported that, per recommendations, she has scheduled a sexual medicine exam to further assess pain. Oriented Mervin to this process and answered her questions about the examination. Therapist and family continued to discuss sexual pain, acknowledging that Mervin has not often shared these experiences with her partner. She was able to discuss the nature and occurrence of pain during partnered sexual activity and answer Miguelito's questions. Miguelito demonstrated support and interest noting that, while he knew there had been pain, he was not aware of it to this degree. Therapist assisted family in  discussing and setting boundaries around pain - specifically, that sexual activity should be altered if pain occurs. Discussed the importance of continuing to be sexual in other ways that are not causing pain for Mervin, which both partners were receptive to doing. Each partner was able to identify pain-free sexual behaviors that they would enjoy doing with one another. Therapist and family also began to discuss the partners' concerns around sexual desire, frequency, and initiation difficulties. Given that the family has engaged in little reported communication about their sexual interests (with Mervin noting uncertainty about her own interests/desires), therapist provided and assigned blank Sexual Interests Checklists for the family to complete. Therapist provided instructions for this homework assignment and asked the partners to keep their answers private until next session. Family expressed excitement about the exercise, with both noting a desire to learn more about the sexual needs of the other. Overall, both partners were active and engaged throughout session.      Assignment:   (1). Family: Complete REST program questionnaires; adhere to boundaries related to painful sex  (2). Mervin: Attend sexual medicine evaluation       Diagnosis:   302.76 (F52.6) - Genito-pelvic pain/penetration disorder  302.72 (F52.22) - Female sexual interest/arousal disorder, acquired, generalized, mild     R/O - Adjustment disorder       Interactive Complexity:  N/A       Plan / Need for Future Services:    Return for family therapy in 2 weeks.             Isha Bajwa, PhD, LP  Licensed Psychologist

## 2018-04-10 ENCOUNTER — OFFICE VISIT (OUTPATIENT)
Dept: OTHER | Facility: OUTPATIENT CENTER | Age: 23
End: 2018-04-10
Payer: COMMERCIAL

## 2018-04-10 VITALS
WEIGHT: 165 LBS | SYSTOLIC BLOOD PRESSURE: 119 MMHG | BODY MASS INDEX: 27.49 KG/M2 | DIASTOLIC BLOOD PRESSURE: 75 MMHG | HEART RATE: 62 BPM | HEIGHT: 65 IN

## 2018-04-10 DIAGNOSIS — T78.40XA ALLERGIC REACTION, INITIAL ENCOUNTER: Primary | ICD-10-CM

## 2018-04-10 NOTE — NURSING NOTE
"Chief Complaint   Patient presents with     Consult     Genito-pelvic pain/penetration disorder        Vitals:    04/10/18 1005   BP: 119/75   Pulse: 62   Weight: 74.8 kg (165 lb)   Height: 1.645 m (5' 4.75\")       Body mass index is 27.67 kg/(m^2).      Gissell Mcfarlane CMA    "

## 2018-04-10 NOTE — MR AVS SNAPSHOT
After Visit Summary   4/10/2018    Mervin Pacheco    MRN: [id number]           Patient Information     Date Of Birth          1995        Visit Information        Provider Department      4/10/2018 10:00 AM Angel Sutton MD [facility]        Today's Diagnoses     Allergic reaction, initial encounter    -  1       Follow-ups after your visit        Additional Services     ALLERGY/ASTHMA ADULT REFERRAL       Your provider has referred you to: [facility] Allergy/Asthma-[facility] - [phone]  [email or web address]    Evaluation for possible semen/seminal plasma allergy    Please be aware that coverage of these services is subject to the terms and limitations of your health insurance plan.  Call member services at your health plan with any benefit or coverage questions.      Please bring the following with you to your appointment:    (1) Any X-Rays, CTs or MRIs which have been performed.  Contact the facility where they were done to arrange for  prior to your scheduled appointment.    (2) List of current medications  (3) This referral request   (4) Any documents/labs given to you for this referral                  Follow-up notes from your care team     Return in about 3 months (around 7/10/2018).      Your next 10 appointments already scheduled     May 07, 2018 12:55 PM CDT   (Arrive by 12:40 PM)   New Allergy with Rony Delong MD   [facility] ([facility])    [address]  [address]  [facility] [id number] [phone]           Do not take anti-histamines or Zantac for seven day prior to your appointment.            May 17, 2018  2:00 PM CDT   Family Therapy with Isha Bajwa, PhD   [facility]    [address]  [address]  [place] [facility] [address]   [phone]            May 31, 2018  1:00 PM CDT  "  Family Therapy with Isha Bajwa, PhD   [facility] ([facility])    [address]  [address]  [facility] [address]   [phone]            Jun 26, 2018 11:20 AM CDT   Return Visit with Angel Sutton MD   [facility] ([facility])    [address]  [address]  [facility] [address]   [phone]              Who to contact     Please call your clinic at [phone] to:    Ask questions about your health    Make or cancel appointments    Discuss your medicines    Learn about your test results    Speak to your doctor            Additional Information About Your Visit        Health Plotter Information     Health Plotter gives you secure access to your electronic health record. If you see a primary care provider, you can also send messages to your care team and make appointments. If you have questions, please call your primary care clinic.  If you do not have a primary care provider, please call [phone] and they will assist you.      Health Plotter is an electronic gateway that provides easy, online access to your medical records. With Health Plotter, you can request a clinic appointment, read your test results, renew a prescription or communicate with your care team.     To access your existing account, please contact your [facility] or call [phone] for assistance.        Care EveryWhere ID     This is your Care EveryWhere ID. This could be used by other organizations to access your [place] medical records  [id number]        Your Vitals Were     Pulse Height Last Period Breastfeeding? BMI (Body Mass Index)       62 1.645 m (5' 4.75\") 03/29/2018 No 27.67 kg/m2        Blood Pressure from Last 3 Encounters:   04/10/18 119/75   11/10/17 113/58   10/31/17 125/63    Weight from Last 3 Encounters:   04/10/18 74.8 kg (165 lb)   11/10/17 69.1 kg (152 lb 6.4 oz)   10/31/17 69.4 kg (153 lb)              We Performed the " Following     ALLERGY/ASTHMA ADULT REFERRAL        Primary Care Provider Office Phone # Fax #    Deqa Ventura Figueroa [phone] [address]  [facility] [address]        Equal Access to Services     EDWARD MUIR : Hadii keily hair isauroo Sowesleyali, waaxda luqadaha, qaybta kaalmada adedeniseda, ifrah bournen kya rios laDeionclaudia doll. So [facility] [phone].    ATENCIÓN: Si habla español, tiene a mejía disposición servicios gratuitos de asistencia lingüística. [facility] [phone].    We comply with applicable federal civil rights laws and [place] laws. We do not discriminate on the basis of race, color, national origin, age, disability, sex, sexual orientation, or gender identity.            Thank you!     Thank you for choosing [facility]  for your care. Our goal is always to provide you with excellent care. Hearing back from our patients is one way we can continue to improve our services. Please take a few minutes to complete the written survey that you may receive in the mail after your visit with us. Thank you!             Your Updated Medication List - Protect others around you: Learn how to safely use, store and throw away your medicines at www.disposemymeds.org.          This list is accurate as of 4/10/18 11:59 PM.  Always use your most recent med list.                   Brand Name Dispense Instructions for use Diagnosis    etonogestrel-ethinyl estradiol 0.12-0.015 MG/24HR vaginal ring    NUVARING    3 each    Insert 1 ring vaginally every 21 days then remove for 1 week then repeat with new ring.    Encounter for initial prescription of vaginal ring hormonal contraceptive       zolpidem 5 MG tablet    AMBIEN    14 tablet    Take 1 tablet (5 mg) by mouth nightly as needed for sleep    Insomnia, unspecified type

## 2018-04-12 ENCOUNTER — CARE COORDINATION (OUTPATIENT)
Dept: OTHER | Facility: OUTPATIENT CENTER | Age: 23
End: 2018-04-12

## 2018-04-12 ENCOUNTER — TRANSFERRED RECORDS (OUTPATIENT)
Dept: HEALTH INFORMATION MANAGEMENT | Facility: CLINIC | Age: 23
End: 2018-04-12

## 2018-04-16 NOTE — PROGRESS NOTES
Program in Human Sexuality  [facility]  [address], [address]  [place] [address]  Outpatient Progress Note      Session Date: 3/22/18  Client Name: Mervin Pacheco  YOB: 1995  MRN: [id number]  Provider: Isha Bajwa, PhD   Type of Session: Family therapy  Present in Session: Client and client's Miguelito kuo  Number of Minutes: 55   Treatment Plan Due: 02/22/2019      Current Symptoms/Status:   Mervin and her fiance, Miguelito, are seeking family therapy to address several sexual concerns, including desire discrepancy in the relationship, difficulties with arousal, and a history of pain for Mervin with penetrative sex (and possible semen protein allergy). Mervin reports appearance related anxiety and noted that body shame and low self-esteem may relate to decreased sexual desire. Treatment goals include increased desire for and frequency of partnered sex, more communication during sex, and help understanding physical pain from sex.      Progress Toward Treatment Goals:   Sexual medicine exam scheduled with Dr. Sutton; set boundaries around painful sex; beginning to communicate more openly about sexual needs/desires      Intervention & Response:   Interpersonal, client-centered, and CBT techniques utilized to address family's current status. Mervin reported that she has not yet attended her sexual medicine examination for sexual pain, but does have it scheduled. Therapist and family reviewed and discussed REST program questionnaires, the Sexual Health Model, and related treatment goals. Continued to assist the partners in exploring and discussing concerns around sexual desire, frequency, and initiation. Miguelito was able to discuss with Mervin that his difficulties with initiating sexual activity relate to a fear that he may come across as coercive or pressuring. She was able to respond openly and compassionately to this disclosure, while also noting that she is able to say no  and challenging Miguelito to let her be assertive in this way (versus assuming responsibility for her responses). Provider also encouraged this shift in relational dynamics, which will allow Mervin to practice verbalizing sexual needs/wants/boundaries. Per homework assignment, therapist assisted the family in beginning to discuss and explore their responses to the Sexual Interests Checklist. Identified that discussing erotica/pornography is particularly difficult for the partners, who both appeared uncomfortable and struggled to share openly about their reactions. Identified that this has been a polarizing topic within the relationship and agreed to return to the discussion in subsequent sessions due to time constraints. Overall, both partners were open to feedback, active, and engaged throughout session.      Assignment:   (1). Family: Adhere to set boundaries related to painful sex  (2). 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             Isha Bajwa, PhD, LP  Licensed Psychologist

## 2018-04-20 PROBLEM — N94.819 VULVODYNIA: Status: ACTIVE | Noted: 2018-04-20

## 2018-04-20 NOTE — PROGRESS NOTES
Sexual Medicine consultation at request of Dr. Isha santiago.      ID:  A 23-year-old cis gendered female.      CHIEF CONCERN:  Dyspareunia, lifelong.        HISTORY OF PRESENT ILLNESS:  The patient describes having 2 different types of sexually related pain.  Prior to becoming sexually active with a partner she had no problems using tampons.  She had no pelvic exams prior to becoming sexually active.  She first became sexually active with a partner at age 15-this was a desired event.  She has had dyspareunia with every partner and with every episode of intercourse.  The nature of the pain has remained the same each time, varying only with semen exposure. The severity of pain has remained stable over time.  She has no pain with sitting, toweling off, wiping, or tight clothing.  She denies any pain with contact to the vulva.  She has no problem with oral sex.  She does have occasional pain with finger penetration - it is a similar kind of pain to that which she experiences with penile intercourse.  Her sexual partners have all been male. She denies pain with foreplay. Approximately a few minutes into penetration she describes an irritation or burning sensation in the vagina all around-it is worse with thrusting.  She describes a loss of lubrication on some occasions.  She denies any pain on penetration itself; it requires full penetration to experience pain. It is worse if there is a change in position, particularly if there is a need to have re-penetration.  The pain lasts as long as intercourse lasts, and up to 30-60 minutes afterwards-even if penetrative sex starts last only a short period of time. This pain occurs without ejaculation.  This pain is better with more foreplay and with increased interest in sex, and increased arousal.  There is no difference with water-based lubricants, no change with with different kinds contraception. It is made worse with a warming type of lubricant; no difference with  latex-only condoms, although they generally do not use condoms with sexual activity-only withdrawal. Of note, she has no pain with pelvic exams.  She did have a Mirena IUD placed 1 year ago.  However, the arms extended into the uterus, causing pain, and were removed.      Pt describes a somewhat different pain with exposure to semen. This occurred only 3 episodes. Of note, the first time she had sexual intercourse they use a condom and it was not especially painful; the first episode of exposure was at age 16 with a previous partner.  She had her usual pain described above and after ejaculation she experienced a more severe vaginal burning, and sense of urgency for the ejaculate to come out of the vagina, a sense of urgency to urinate, and a sense of perhaps labial swelling, mainly at the introitus. There was no itching or hives, no other skin symptoms, no rash, no shortness of breath, no tongue or lip swelling.  She had 2 other episodes with her current partner, at age 20 and 21.  Again, mainly loosening of her current vaginal burning, and a sense of urinary urgency.  These symptoms are prevented by withdrawal.      She has normally regular periods lasting 4-5 days with normal flow and minimal cramping; birth control has included Mirena as above.  She was started on birth control pills at age 15, then switched to Depo-Provera at age 16 due to trouble remembering pills.  Was on NuvaRing briefly andNexplanon for 2 years, due to not having to think about contraception; then she was on no contraception for 2 years, and then on IUD at age 22. She noticed no difference in her dyspareunia with any of these methods.  She is now back on the NuvaRing.       Current medical problems include sensitive skin.  She does shave her pubic hair.  No other medical problems except for very mild insomnia.      MEDICATIONS:  Melatonin and rare use of Ambien.      ALLERGIES:  No known drug allergies.      PAST MEDICAL HISTORY:  No  hospitalizations, she has had appendectomy and tonsillectomy.      FAMILY HISTORY:  Mother with scar tissue on an ovary, otherwise well; father with diabetes, hypertension; sister with HPV, grandmother with colon cancer.  Grandfather with diabetes, maternal aunt with postmenopausal breast cancer, a cousin with bipolar disorder.      SOCIAL HISTORY:  Pt eats a standard diet with some dairy.  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   Sexual History: total partners in the lifetime: 2, both male, no STIs.  Paps have been normal.  Vaccinated against hepatitis B and HPV.  Never smoker, alcohol once a month, 1-2 drinks per sitting, no recreational drug use, has tested negative for HIV.      ROS:  Notable mainly for anxiety related to her current sexual pain.      PHYSICAL EXAM:   GENERAL:  Pt is alert, in no apparent distress.  Vital signs as per nursing above and all within normal limits.  Speech regular rate and rhythm, mood is euthymic.  Pupils equal and reactive to light.  Oropharnyx:  No lesions, no erythema.   NECK:  Supple, no thyroid enlargement, no carotid bruits, no adenopathy.   HEART:  S1S2, no murmurs, lungs are clear to auscultation bilaterally.   SKIN:  No lesions, no rashes.  Genital exam:  pubic hair has been shaved but otherwise in the normal female distribution.  External genitalia show normal genital anatomy with no lesions or rashes.  Urethra is normal.  Bartholin 16 glans normal.  Qtip test is positive at the 8-9 o'clock and 3-4 o'clock area is marginal at 6:00; speculum exam of vaginal walls shows normal rugae pink, with clear secretions.  No other lesions.  Bimanual exam reproduces pain just at or just inside the introitus, with a 2-finger bimanual; pelvic floor musculature is otherwise fairly normal.  There is minimal pain further along the vaginal walls.      ASSESSMENT:    1.  Dyspareunia.  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  Discussed the nature and treatment of vulvodynia with the patient, including the chronic waxing and waning nature, and options for treatment including vulvar hygiene, self-care, the role of pelvic physical therapy, and both topical and systemic pain modulators. Recommend vulvar care, continuing with sex therapy and referral to [facility] for pelvic physical therapy. At this point, can use baking soda Sitz baths for relief of symptoms as needed and can consider other medical interventions at next visit.   2.  Possible Semen Allergy. Patient's symptoms are somewhat atypical for semen allergy, given that human seminal proteins are likely to be present in preejaculate as well as in ejaculate. In addition, the patient's irritation is not accompanied by significant itching or notable edema, and may be simply exacerbation of her vulvodynia symptoms; however, best method to diagnose a semen allergy is by skin testing and recommend seeing an allergist for this purpose to evaluate. Follow up in approximately 3 months.

## 2018-05-02 ENCOUNTER — MYC REFILL (OUTPATIENT)
Dept: FAMILY MEDICINE | Facility: CLINIC | Age: 23
End: 2018-05-02

## 2018-05-02 ENCOUNTER — TELEPHONE (OUTPATIENT)
Dept: ALLERGY | Facility: CLINIC | Age: 23
End: 2018-05-02

## 2018-05-02 DIAGNOSIS — Z30.015 ENCOUNTER FOR INITIAL PRESCRIPTION OF VAGINAL RING HORMONAL CONTRACEPTIVE: ICD-10-CM

## 2018-05-02 NOTE — TELEPHONE ENCOUNTER
RN left message for patient to return call to RN's direct line @ [phone] until 5pm, or tomorrow at [phone].    Marcela Dodd RN

## 2018-05-02 NOTE — TELEPHONE ENCOUNTER
Please call the patient and advise that this is something we cannot test for in our clinic as it is not as simple as a standard allergy skin test. She should be advised that she would need an initial consultation appointment with an allergist and if they determine testing is appropriate her  would first need to have specific blood tests done. They would then both need to be present in the allergy clinic on the day of testing. I recommend she consider [facility] as they may perform this testing. Another center specializing in this is the [facility]. Her referring provider can contact either center to place a referral for further evaluation.

## 2018-05-02 NOTE — TELEPHONE ENCOUNTER
Message from MyChart:  Original authorizing provider: MD Mervin Sequeira would like a refill of the following medications:  etonogestrel-ethinyl estradiol (NUVARING) 0.12-0.015 MG/24HR vaginal ring [Veena Figueroa MD]    Preferred pharmacy: [facility] - [address] [place], [place] [address] [facility] [place], [address]    Comment:

## 2018-05-02 NOTE — TELEPHONE ENCOUNTER
Reason for Call:  Other call back    Detailed comments: Appointment scheduled via MightyText. Forwarding message due to patient questions-    Appointment For: NATANAEL WADE ([id number])      [facility]          5/17/2018   10:20 AM  40 mins.  Levi Borrego MD          [facility]          Patient Comments:     Allergy Care     Procedure Request from Dr. Angel Suttno for      evaluation of possible semen/seminal plasma allergy.      Please contact me regarding what I would need to bring      (my ?) for this and if Dr. Borrego performs this      procedure.     Thank you!         Phone Number Patient can be reached at: Home number on file [phone] (home)    Best Time: any    Can we leave a detailed message on this number? YES    Call taken on 5/2/2018 at 2:09 PM by Dari Bautista

## 2018-05-02 NOTE — TELEPHONE ENCOUNTER
"Requested Prescriptions   Pending Prescriptions Disp Refills     etonogestrel-ethinyl estradiol (NUVARING) 0.12-0.015 MG/24HR vaginal ring  Last Written Prescription Date:  5/19/2017  Last Fill Quantity: 3,  # refills: 3   Last Office Visit: 11/10/2017   Future Office Visit:      3 each 3     Sig: Insert 1 ring vaginally every 21 days then remove for 1 week then repeat with new ring.    Contraceptives Protocol Passed    5/2/2018  1:26 PM       Passed - Patient is not a current smoker if age is 35 or older       Passed - Recent (12 mo) or future (30 days) visit within the authorizing provider's specialty    Patient had office visit in the last 12 months or has a visit in the next 30 days with authorizing provider or within the authorizing provider's specialty.  See \"Patient Info\" tab in inbasket, or \"Choose Columns\" in Meds & Orders section of the refill encounter.           Passed - No active pregnancy on record       Passed - No positive pregnancy test in past 12 months          "

## 2018-05-02 NOTE — TELEPHONE ENCOUNTER
Notified patient, appointment cancelled.  She will contact her referring provider for referral.  Closing encounter.    Marcela Dodd RN

## 2018-05-07 ENCOUNTER — PRE VISIT (OUTPATIENT)
Dept: PULMONOLOGY | Facility: CLINIC | Age: 23
End: 2018-05-07

## 2018-05-07 NOTE — TELEPHONE ENCOUNTER
Pt has appt on 6/26/18 for sexual health check with Dr Sutton. See note to pt.     Maria Elena Bermudez RN, BSN

## 2018-05-17 ENCOUNTER — OFFICE VISIT (OUTPATIENT)
Dept: OTHER | Facility: OUTPATIENT CENTER | Age: 23
End: 2018-05-17

## 2018-05-17 DIAGNOSIS — F52.22 FEMALE SEXUAL INTEREST/AROUSAL DISORDER, ACQUIRED, GENERALIZED, MILD: ICD-10-CM

## 2018-05-17 DIAGNOSIS — F52.6 GENITO-PELVIC PAIN/PENETRATION DISORDER: Primary | ICD-10-CM

## 2018-05-17 NOTE — MR AVS SNAPSHOT
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, acquired, generalized, mild           Follow-ups after your visit        Your next 10 appointments already scheduled     May 31, 2018  1:00 PM CDT   Family Therapy with Isha Bajwa PhD   [facility] ([facility])    [address]  Mail [address] 7521  [facility] [address]   [phone]            Jun 26, 2018 11:20 AM CDT   Return Visit with Angel Sutton MD   [facility] ([facility])    [address]  Mail [address] 7521  [facility] [address]   [phone]            Jun 28, 2018  1:00 PM CDT   Family Therapy with Isha Bajwa PhD   [facility] ([facility])    [address]  Mail Code 7521  [facility] [address]   [phone]            Jul 12, 2018 10:30 AM CDT   Family Therapy with Isha Bajwa PhD   [facility] ([facility])    [address]  Mail [address]  [facility] [address]   [phone]            Jul 26, 2018 11:00 AM CDT   Family Therapy with Isha Bajwa PhD   [facility] ([facility])    [address]  Mail [address] 7521  [facility] [address]   [phone]              Who to contact     Please call your clinic at [phone] to:    Ask questions about your health    Make or cancel appointments    Discuss your medicines    Learn about your test results    Speak to your doctor            Additional Information About Your Visit        MyChart Information     [employer] gives you secure access to your electronic health record.  If you see a primary care provider, you can also send messages to your care team and make appointments. If you have questions, please call your primary care clinic.  If you do not have a primary care provider, please call [phone] and they will assist you.      [employer] is an electronic gateway that provides easy, online access to your medical records. With Ghost, you can request a clinic appointment, read your test results, renew a prescription or communicate with your care team.     To access your existing account, please contact your [facility] or call [phone] for assistance.        Care EveryWhere ID     This is your Care EveryWhere ID. This could be used by other organizations to access your [place] medical records  [id number]         Blood Pressure from Last 3 Encounters:   04/10/18 119/75   11/10/17 113/58   10/31/17 125/63    Weight from Last 3 Encounters:   04/10/18 74.8 kg (165 lb)   11/10/17 69.1 kg (152 lb 6.4 oz)   10/31/17 69.4 kg (153 lb)              We Performed the Following     Psychotherapy Family/Couple with Patient [[id number]]        Primary Care Provider Office Phone # Fax #    Deqa Ventura Figueroa [phone]       [address]  [facility] [address]        Equal Access to Services     EDWARD MUIR : Hadii aad ku hadasho Soomaali, waaxda luqadaha, qaybta kaalmada adeegyada, ifrah del valle hayclaudia miller . So [facility] [phone].    ATENCIÓN: Si habla español, tiene a mejía disposición servicios gratuitos de asistencia lingüística. Llame al [phone].    We comply with applicable federal civil rights laws and [place] laws. We do not discriminate on the basis of race, color, national origin, age, disability, sex, sexual orientation, or gender identity.            Thank you!     Thank you for choosing [facility]  for your care. Our goal is always to provide you with excellent care. Hearing back from our patients  is one way we can continue to improve our services. Please take a few minutes to complete the written survey that you may receive in the mail after your visit with us. Thank you!             Your Updated Medication List - Protect others around you: Learn how to safely use, store and throw away your medicines at www.disposemymeds.org.          This list is accurate as of 5/17/18 11:59 PM.  Always use your most recent med list.                   Brand Name Dispense Instructions for use Diagnosis    etonogestrel-ethinyl estradiol 0.12-0.015 MG/24HR vaginal ring    NUVARING    3 each    Insert 1 ring vaginally every 21 days then remove for 1 week then repeat with new ring.    Encounter for initial prescription of vaginal ring hormonal contraceptive       zolpidem 5 MG tablet    AMBIEN    14 tablet    Take 1 tablet (5 mg) by mouth nightly as needed for sleep    Insomnia, unspecified type

## 2018-05-18 RX ORDER — ETONOGESTREL AND ETHINYL ESTRADIOL VAGINAL RING .015; .12 MG/D; MG/D
RING VAGINAL
Qty: 3 EACH | Refills: 0 | Status: SHIPPED | OUTPATIENT
Start: 2018-05-18 | End: 2018-07-18

## 2018-05-18 NOTE — TELEPHONE ENCOUNTER
Patient has been seen in the last year - would be eligible to more than a one month supply - she sounds agreeable to discuss with another provider so will offer one month at this time      Prescription approved per [facility] Refill Protocol.    Signed Prescriptions:                        Disp   Refills    etonogestrel-ethinyl estradiol (NUVARING) *3 each 0        Sig: Insert 1 ring vaginally every 21 days then remove for           1 week then repeat with new ring.  Authorizing Provider: GLEN TAPIA  Ordering User: NATALIE STONE      Closing encounter - no further actions needed at this time    Natalie Stone RN

## 2018-05-29 NOTE — PROGRESS NOTES
Program in Human Sexuality  [facility]  [address]  [place] [address]  Outpatient Progress Note      Session Date: 5/17/18  Client Name: Mervin Pacheco  YOB: 1995  MRN: [id number]  Provider: Isha Bajwa, PhD   Type of Session: Family therapy  Present in Session: Client and client's Miguelito kuo  Number of Minutes: 55   Treatment Plan Due: 02/22/2019      Current Symptoms/Status:   Mervin and her fianceMiguelito, are seeking family therapy to address several sexual concerns, including desire discrepancy in the relationship, difficulties with arousal and sexual communication, and a history of pain for Mervin with penetrative sex (and possible semen protein allergy). Mervin reports appearance related anxiety and noted that body shame and low self-esteem may relate to her sexual concerns. Treatment goals include increased desire for and frequency of partnered sex, more communication during sex, and help addressing sexual pain.      Progress Toward Treatment Goals:   Sexual medicine exam completed with Dr. Sutton (April 2018) and began pelvic PT; set boundaries around painful sex; family beginning to communicate more openly about sexual needs/desires      Intervention & Response:   Interpersonal, CBT, and family systems/family therapy techniques utilized to address family's current status. Mervin shared updates from her recently completed sexual medicine exam. She reported that, per recommendations, she started pelvic PT about 6 weeks ago (3 sessions thus far) and that this is going well. Identified and discussed that she has been compliant with PT homework/stretching/dilator use, and involving Miguelito in these exercises, as possible. Mervin shared that she is still deciding whether to pursue allergy-related referral given that the closest specialist is some distance away, at [facility]. Celebrated and reinforced these major steps taken since last session to  address sexual pain concerns. Both partners shared that their sexual desire levels have felt more consistent and aligned in recent weeks. Family shared that they have recently been sexual together about once/week and both partners noted increased  intentionality  around spending intimate time together. The family reported engaging in some penetrative sexual activity together, but have been successful in maintaining set boundaries related to pain. Per last session, therapist and family continued to discuss and process the Sexual Interests Checklist exercise. Revisited discussion of erotica/pornography, which was initially difficult for the partners but has reportedly led to additional conversations and greater understanding. Therapist and family also explored themes related to virtual sex, definitions of sex, and what constitutes infidelity/cheating. For homework, the partners agreed to continue discussing their checklists together, noting areas for further discussion in sessions. At session end, the family noted that their wedding planning for August is going well and they are feeling excited about this next step. Overall, both partners were open to feedback, active, and engaged throughout session.      Assignment:   (1). Family: Adhere to set boundaries related to painful sex; Discuss Sexual Interests Checklist together  (2). 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             Isha Bajwa, PhD, LP  Licensed Psychologist

## 2018-05-30 ENCOUNTER — TELEPHONE (OUTPATIENT)
Dept: OTHER | Facility: OUTPATIENT CENTER | Age: 23
End: 2018-05-30

## 2018-06-20 ENCOUNTER — TRANSFERRED RECORDS (OUTPATIENT)
Dept: HEALTH INFORMATION MANAGEMENT | Facility: CLINIC | Age: 23
End: 2018-06-20

## 2018-06-26 ENCOUNTER — OFFICE VISIT (OUTPATIENT)
Dept: OTHER | Facility: OUTPATIENT CENTER | Age: 23
End: 2018-06-26

## 2018-06-26 VITALS
WEIGHT: 155 LBS | HEART RATE: 61 BPM | SYSTOLIC BLOOD PRESSURE: 108 MMHG | HEIGHT: 65 IN | DIASTOLIC BLOOD PRESSURE: 69 MMHG | BODY MASS INDEX: 25.83 KG/M2

## 2018-06-26 DIAGNOSIS — N94.819 VULVODYNIA: Primary | ICD-10-CM

## 2018-06-26 RX ORDER — LIDOCAINE 5 G/100G
CREAM RECTAL; TOPICAL
Qty: 45 G | Refills: 3 | Status: SHIPPED | OUTPATIENT
Start: 2018-06-26 | End: 2018-12-10

## 2018-06-26 ASSESSMENT — ENCOUNTER SYMPTOMS: CONSTITUTIONAL NEGATIVE: 1

## 2018-06-26 NOTE — MR AVS SNAPSHOT
After Visit Summary   6/26/2018    Mervin Pacheco    MRN: [id number]           Patient Information     Date Of Birth          1995        Visit Information        Provider Department      6/26/2018 11:20 AM Angel Sutton MD [facility]        Today's Diagnoses     Vulvodynia    -  1       Follow-ups after your visit        Follow-up notes from your care team     Return in about 3 months (around 9/26/2018).      Your next 10 appointments already scheduled     Jul 12, 2018 10:30 AM CDT   Family Therapy with Isha Bajwa, PhD   [facility] ([facility])    [address]  Mail [address] 7521  [facility] [address]   [phone]            Jul 18, 2018  8:40 AM CDT   New Patient Visit with OCTAVIO Collazo [facility]   [facility] ([facility])    [place] [facility] [address]  [address]  [facility] [address]   [phone]            Jul 26, 2018 11:00 AM CDT   Family Therapy with Isha Bajwa, PhD   [facility] ([facility])    [address]  Mail [address] 7521  [facility] [address]   [phone]              Who to contact     Please call your clinic at [phone] to:    Ask questions about your health    Make or cancel appointments    Discuss your medicines    Learn about your test results    Speak to your doctor            Additional Information About Your Visit        zeroboundharThinker Thing Information     [employer] gives you secure access to your electronic health record. If you see a primary care provider, you can also send messages to your care team and make appointments. If you have questions, please call your primary care clinic.  If you do not have a primary care provider, please call [phone] and they will assist you.      [employer] is an electronic gateway that provides easy, online access to your medical records. With [employer], you can request a clinic  "appointment, read your test results, renew a prescription or communicate with your care team.     To access your existing account, please contact your [facility] or call [phone] for assistance.        Care EveryWhere ID     This is your Care EveryWhere ID. This could be used by other organizations to access your [place] medical records  [id number]        Your Vitals Were     Pulse Height Last Period BMI (Body Mass Index)          61 1.645 m (5' 4.75\") 06/21/2018 25.99 kg/m2         Blood Pressure from Last 3 Encounters:   06/26/18 108/69   04/10/18 119/75   11/10/17 113/58    Weight from Last 3 Encounters:   06/26/18 70.3 kg (155 lb)   04/10/18 74.8 kg (165 lb)   11/10/17 69.1 kg (152 lb 6.4 oz)              Today, you had the following     No orders found for display         Today's Medication Changes          These changes are accurate as of 6/26/18 11:59 PM.  If you have any questions, ask your nurse or doctor.               Start taking these medicines.        Dose/Directions    lidocaine (Anorectal) 5 % Crea   Used for:  Vulvodynia        Apply to vulvar area about 15 minutes prior to sex or up to 2x/day prn for pain   Quantity:  45 g   Refills:  3            Where to get your medicines      These medications were sent to [facility] [place], [place] - [address]  [address] [place], [facility] [address]     Phone:  [phone]     lidocaine (Anorectal) 5 % Crea                Primary Care Provider Office Phone # Fax #    Deqa Ventura Figueroa [phone] [address]  [facility] [address]        Equal Access to Services     DEWARD MUIR : Melissa Benites, aly barillas, qaizzy kaalifrah sawant. So [facility] [phone].    ATENCIÓN: Si habla español, tiene a mejía disposición servicios gratuitos de asistencia lingüística. " Ernestina munoz [phone].    We comply with applicable federal civil rights laws and [place] laws. We do not discriminate on the basis of race, color, national origin, age, disability, sex, sexual orientation, or gender identity.            Thank you!     Thank you for choosing [facility]  for your care. Our goal is always to provide you with excellent care. Hearing back from our patients is one way we can continue to improve our services. Please take a few minutes to complete the written survey that you may receive in the mail after your visit with us. Thank you!             Your Updated Medication List - Protect others around you: Learn how to safely use, store and throw away your medicines at www.disposemymeds.org.          This list is accurate as of 6/26/18 11:59 PM.  Always use your most recent med list.                   Brand Name Dispense Instructions for use Diagnosis    etonogestrel-ethinyl estradiol 0.12-0.015 MG/24HR vaginal ring    NUVARING    3 each    Insert 1 ring vaginally every 21 days then remove for 1 week then repeat with new ring.    Encounter for initial prescription of vaginal ring hormonal contraceptive       lidocaine (Anorectal) 5 % Crea     45 g    Apply to vulvar area about 15 minutes prior to sex or up to 2x/day prn for pain    Vulvodynia       zolpidem 5 MG tablet    AMBIEN    14 tablet    Take 1 tablet (5 mg) by mouth nightly as needed for sleep    Insomnia, unspecified type

## 2018-06-26 NOTE — PROGRESS NOTES
"HPI  Pt. Here for vulvar pain and dyspareunia    Has completed pelvic PT, and internal pain is significantly improved. No one does semen allergy testing locally, would need to go to [place] for it.   She currently is having pain mostly with menses, but not with sitting, biking or towleing, and pain is located primarily on the vulva/vesitulbar area.  She describes pain as mainly \"irritated pain\"  During sexual activity, pain is intermittent; doesn't always happen, depends on angle of penetration, but always with contact at vulvar/vestibule area.    Using Nuvaring for contraception    Review of Systems   Constitutional: Negative.    Genitourinary: Negative.    no vaginal discharege or itching    Physical Exam  Blood pressure 108/69, pulse 61, height 1.645 m (5' 4.75\"), weight 70.3 kg (155 lb), last menstrual period 06/21/2018, not currently breastfeeding.  Speech RRR, mood is euthymic, no psychomotor changes, thought process is appropriate,   Affect is normal. No evidence of suicidality.     A/P  1. Vulvodynia  Significant improvement. Discussed options for managing residual pain, including topical treatments  Trial of 5% lidocaine cream 15 minutes prior to intercourse or up to 2x/day prn for pain  Trial of extended use Nuvaring to limit menses--use ring \"back to back\" for 3 months, then 1 week off menses. Cautioned about possible break through bleeding      Fu 3 months, sooner if needed   "

## 2018-06-26 NOTE — NURSING NOTE
"Chief Complaint   Patient presents with     RECHECK     pelvic pain       Vitals:    06/26/18 1124   BP: 108/69   Pulse: 61   Weight: 70.3 kg (155 lb)   Height: 1.645 m (5' 4.75\")       Body mass index is 25.99 kg/(m^2).      Gissell Mcfarlane CMA    "

## 2018-06-28 ENCOUNTER — OFFICE VISIT (OUTPATIENT)
Dept: OTHER | Facility: OUTPATIENT CENTER | Age: 23
End: 2018-06-28

## 2018-06-28 DIAGNOSIS — F52.6 GENITO-PELVIC PAIN/PENETRATION DISORDER: Primary | ICD-10-CM

## 2018-06-28 PROBLEM — F52.22: Status: RESOLVED | Noted: 2018-02-17 | Resolved: 2018-06-28

## 2018-06-28 NOTE — MR AVS SNAPSHOT
After Visit Summary   6/28/2018    Mervin Pacheco    MRN: [id number]           Patient Information     Date Of Birth          1995        Visit Information        Provider Department      6/28/2018 1:00 PM Isha Bajwa, PhD [facility]        Today's Diagnoses     Genito-pelvic pain/penetration disorder    -  1       Follow-ups after your visit        Your next 10 appointments already scheduled     Jul 11, 2018  8:40 AM CDT   New Patient Visit with OCTAVIO Collazo [facility]   [facility] ([facility])    [place] [facility] [address]  [address]  [address]  [facility] [address]   [phone]            Jul 12, 2018 10:30 AM CDT   Family Therapy with Isha Bajwa, PhD   [facility] ([facility])    [address]  Mail [address] 7521  [facility] [address]   [phone]            Jul 26, 2018 11:00 AM CDT   Family Therapy with Isha Bajwa, PhD   [facility] ([facility])    [address]  [address]  [facility] [address]   [phone]              Who to contact     Please call your clinic at [phone] to:    Ask questions about your health    Make or cancel appointments    Discuss your medicines    Learn about your test results    Speak to your doctor            Additional Information About Your Visit        MyChart Information     [employer] gives you secure access to your electronic health record. If you see a primary care provider, you can also send messages to your care team and make appointments. If you have questions, please call your primary care clinic.  If you do not have a primary care provider, please call [phone] and they will assist you.      [employer] is an electronic gateway that provides easy, online access to your medical records. With [employer], you can request a clinic appointment, read your test results, renew a prescription or  communicate with your care team.     To access your existing account, please contact your [facility] or call [phone] for assistance.        Care EveryWhere ID     This is your Care EveryWhere ID. This could be used by other organizations to access your [place] medical records  [id number]        Your Vitals Were     Last Period                   06/21/2018            Blood Pressure from Last 3 Encounters:   06/26/18 108/69   04/10/18 119/75   11/10/17 113/58    Weight from Last 3 Encounters:   06/26/18 70.3 kg (155 lb)   04/10/18 74.8 kg (165 lb)   11/10/17 69.1 kg (152 lb 6.4 oz)              We Performed the Following     Psychotherapy Family/Couple with Patient [[id number]]        Primary Care Provider Office Phone # Fax #    Deqa Ventura Figueroa [phone]       [address]  [facility] [address]        Equal Access to Services     EDWARD MUIR : Hadii aad ku hadasho Soomaali, waaxda luqadaha, qaybta kaalmada adeegyada, waxay edithin haytanjan kya miller . So [facility] [phone].    ATENCIÓN: Si habla español, tiene a mejía disposición servicios gratuitos de asistencia lingüística. Llame al [phone].    We comply with applicable federal civil rights laws and [place] laws. We do not discriminate on the basis of race, color, national origin, age, disability, sex, sexual orientation, or gender identity.            Thank you!     Thank you for choosing [facility]  for your care. Our goal is always to provide you with excellent care. Hearing back from our patients is one way we can continue to improve our services. Please take a few minutes to complete the written survey that you may receive in the mail after your visit with us. Thank you!             Your Updated Medication List - Protect others around you: Learn how to safely use, store and throw away your medicines at www.disposemymeds.org.          This list is accurate as of 6/28/18  2:40  PM.  Always use your most recent med list.                   Brand Name Dispense Instructions for use Diagnosis    etonogestrel-ethinyl estradiol 0.12-0.015 MG/24HR vaginal ring    NUVARING    3 each    Insert 1 ring vaginally every 21 days then remove for 1 week then repeat with new ring.    Encounter for initial prescription of vaginal ring hormonal contraceptive       lidocaine (Anorectal) 5 % Crea     45 g    Apply to vulvar area about 15 minutes prior to sex or up to 2x/day prn for pain    Vulvodynia       zolpidem 5 MG tablet    AMBIEN    14 tablet    Take 1 tablet (5 mg) by mouth nightly as needed for sleep    Insomnia, unspecified type

## 2018-06-28 NOTE — PROGRESS NOTES
[facility]  [facility]  [address], [address]  [place] [address]  Outpatient Progress Note      Session Date: 6/28/18  Client Name: Mervin Pacheco  YOB: 1995  MRN: [id number]  Provider: Isha Bajwa, PhD   Type of Session: Family therapy  Present in Session: Client and client's Miguelito kuo  Number of Minutes: 55   Treatment Plan Due: 02/22/2019      Current Symptoms/Status:   Mervin and her fianceMiguelito, sought family therapy to address several sexual concerns, including desire discrepancy in the relationship, difficulties with arousal and sexual communication, and a history of pain for Mervin with penetrative sex (and possible semen protein allergy). Mervin reports appearance related anxiety and noted that body shame and low self-esteem may relate to her sexual concerns. Treatment goals include increased desire for and frequency of partnered sex, more communication during sex, and help addressing sexual pain. Desire discrepancy issues have resolved since intake, especially given improvement of sexual communication and decrease in pelvic/genital pain.     Note: Given resolution of reported symptoms surrounding sexual desire, provider has removed diagnosis of (F52.22) female sexual interest/arousal disorder, acquired, generalized, mild      Progress Toward Treatment Goals:   Sexual medicine exam and pelvic PT completed (May 2018) with decrease, particularly in internal pain; Family communicating more openly about sex and sexuality      Intervention & Response:   Interpersonal, CBT, and family systems/family therapy techniques utilized to address family's current status. Therapist disclosed that she will be leaving clinic in August to take a new position and engaged family in discussion of transfer of care. Mervin provided updates related to completing pelvic PT and family shared increasing successes with partnered sexual activity. Identified and  "discussed that sexual desire discrepancy no longer seems to be a concern, with both partners noting how their expectations about sexual frequency have shifted to become more realistic since intake (~ once/week going well for both) and reporting that they both feel satisfied. Given these treatment successes, family indicated that they will plan to discontinue care when this provider leaves. Discussed that, should concerns arise again in the future, that the family is welcome to return to this clinic. Mervin provided updates from her recent sexual medicine follow-up, indicating that she plans to try lidocaine for remaining (inconsistent) vulvar/vestibule pain. Explored her thoughts regarding potential oral pain management options, which she has not yet decided upon. Family requested to take time in session to discuss and explore dynamics around engaging in solo masturbation if partner sex is declined (given that initiation of sexual activity is now going well). Assisted both partners in exploring their reactions to such a request, as well as in exploring messages/myths that they have learned and internalized regarding masturbation. Mervin identified learning (but not consciously \"believing\") that men no longer need to masturbate when in relationships, and was able to discuss feelings of guilt related to having her partner masturbate if she has declined partnered sex. Family was able to talk openly, non-defensively, and thoughtfully with one another about their reactions and viewpoints, which culminated in a better understanding of each other's sexual preferences (including how this relates to pain experiences for Mervin). Per last session, family reported that they still plan to review and discuss their Sexual Interests Checklist together and this was assigned for homework. Provider also encouraged family to consider reading Sex for One (Jacy Taylor) for further consideration of reactions related to masturbation. At " "session end, family noted that wedding planning for August is going well and they are feeling excited about this next step. Overall, both partners were open to feedback, active, and engaged throughout session.      Assignment:   (1). Family: Adhere to set boundaries related to painful sex; Discuss Sexual Interests Checklist together  (2). Mervin: Pelvic PT exercises and appointments. Obtain and begin reading \"Sex for One\" by Jacy Taylor.       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             Isha Bajwa, PhD, LP  Licensed Psychologist  "

## 2018-07-12 ENCOUNTER — OFFICE VISIT (OUTPATIENT)
Dept: OTHER | Facility: OUTPATIENT CENTER | Age: 23
End: 2018-07-12

## 2018-07-12 DIAGNOSIS — F52.6 GENITO-PELVIC PAIN/PENETRATION DISORDER: Primary | ICD-10-CM

## 2018-07-12 NOTE — MR AVS SNAPSHOT
After Visit Summary   7/12/2018    Mervin Pacheco    MRN: [id number]           Patient Information     Date Of Birth          1995        Visit Information        Provider Department      7/12/2018 10:30 AM Isha Bajwa, PhD [facility]        Today's Diagnoses     Genito-pelvic pain/penetration disorder    -  1       Follow-ups after your visit        Your next 10 appointments already scheduled     Sep 24, 2018  8:00 AM CDT   Return Visit with Angel Sutton MD   [facility] ([facility])    [address]  [address]  [facility] [address]   [phone]              Who to contact     Please call your clinic at [phone] to:    Ask questions about your health    Make or cancel appointments    Discuss your medicines    Learn about your test results    Speak to your doctor            Additional Information About Your Visit        MyChart Information     [employer] gives you secure access to your electronic health record. If you see a primary care provider, you can also send messages to your care team and make appointments. If you have questions, please call your primary care clinic.  If you do not have a primary care provider, please call [phone] and they will assist you.      [employer] is an electronic gateway that provides easy, online access to your medical records. With [employer], you can request a clinic appointment, read your test results, renew a prescription or communicate with your care team.     To access your existing account, please contact your [facility] or call [phone] for assistance.        Care EveryWhere ID     This is your Care EveryWhere ID. This could be used by other organizations to access your [facility] medical records  [id number]        Your Vitals Were     Last Period                   06/21/2018            Blood Pressure from Last 3 Encounters:   07/18/18 120/75   06/26/18  108/69   04/10/18 119/75    Weight from Last 3 Encounters:   07/18/18 70.3 kg (155 lb)   06/26/18 70.3 kg (155 lb)   04/10/18 74.8 kg (165 lb)              We Performed the Following     Psychotherapy Family/Couple with Patient [[id number]]        Primary Care Provider Office Phone # Fax #    Genaa Ventura Figueroa [phone]       [address] [facility] [address]        Equal Access to Services     EDWARD MUIR : Hadii aad ku hadasho Soomaali, waaxda luqadaha, qaybta kaalmada adeegyada, waxlaurel del valle hayclaudia miller . So [facility] [phone].    ATENCIÓN: Si habla español, tiene a mejía disposición servicios gratuitos de asistencia lingüística. [facility] [phone].    We comply with applicable federal civil rights laws and [place] laws. We do not discriminate on the basis of race, color, national origin, age, disability, sex, sexual orientation, or gender identity.            Thank you!     Thank you for choosing [facility]  for your care. Our goal is always to provide you with excellent care. Hearing back from our patients is one way we can continue to improve our services. Please take a few minutes to complete the written survey that you may receive in the mail after your visit with us. Thank you!             Your Updated Medication List - Protect others around you: Learn how to safely use, store and throw away your medicines at www.disposemymeds.org.          This list is accurate as of 7/12/18 11:59 PM.  Always use your most recent med list.                   Brand Name Dispense Instructions for use Diagnosis    lidocaine (Anorectal) 5 % Crea     45 g    Apply to vulvar area about 15 minutes prior to sex or up to 2x/day prn for pain    Vulvodynia

## 2018-07-15 ASSESSMENT — ANXIETY QUESTIONNAIRES
7. FEELING AFRAID AS IF SOMETHING AWFUL MIGHT HAPPEN: NOT AT ALL
4. TROUBLE RELAXING: MORE THAN HALF THE DAYS
7. FEELING AFRAID AS IF SOMETHING AWFUL MIGHT HAPPEN: NOT AT ALL
3. WORRYING TOO MUCH ABOUT DIFFERENT THINGS: MORE THAN HALF THE DAYS
5. BEING SO RESTLESS THAT IT IS HARD TO SIT STILL: NOT AT ALL
GAD7 TOTAL SCORE: 10
1. FEELING NERVOUS, ANXIOUS, OR ON EDGE: MORE THAN HALF THE DAYS
2. NOT BEING ABLE TO STOP OR CONTROL WORRYING: MORE THAN HALF THE DAYS
6. BECOMING EASILY ANNOYED OR IRRITABLE: MORE THAN HALF THE DAYS
GAD7 TOTAL SCORE: 10

## 2018-07-15 ASSESSMENT — ENCOUNTER SYMPTOMS
DEPRESSION: 0
EYE PAIN: 0
HOT FLASHES: 0
EYE REDNESS: 1
DECREASED CONCENTRATION: 0
PANIC: 0
DOUBLE VISION: 0
EYE WATERING: 0
DECREASED LIBIDO: 1
NERVOUS/ANXIOUS: 1
INSOMNIA: 1
EYE IRRITATION: 0

## 2018-07-18 ENCOUNTER — OFFICE VISIT (OUTPATIENT)
Dept: OBGYN | Facility: CLINIC | Age: 23
End: 2018-07-18
Attending: NURSE PRACTITIONER
Payer: COMMERCIAL

## 2018-07-18 VITALS
HEART RATE: 73 BPM | SYSTOLIC BLOOD PRESSURE: 120 MMHG | BODY MASS INDEX: 25.83 KG/M2 | HEIGHT: 65 IN | WEIGHT: 155 LBS | DIASTOLIC BLOOD PRESSURE: 75 MMHG

## 2018-07-18 DIAGNOSIS — F41.9 ANXIETY: ICD-10-CM

## 2018-07-18 DIAGNOSIS — Z30.015 ENCOUNTER FOR INITIAL PRESCRIPTION OF VAGINAL RING HORMONAL CONTRACEPTIVE: ICD-10-CM

## 2018-07-18 DIAGNOSIS — G47.00 INSOMNIA, UNSPECIFIED TYPE: ICD-10-CM

## 2018-07-18 DIAGNOSIS — Z00.00 VISIT FOR PREVENTIVE HEALTH EXAMINATION: Primary | ICD-10-CM

## 2018-07-18 PROCEDURE — G0463 HOSPITAL OUTPT CLINIC VISIT: HCPCS | Mod: ZF

## 2018-07-18 PROCEDURE — G0145 SCR C/V CYTO,THINLAYER,RESCR: HCPCS | Performed by: NURSE PRACTITIONER

## 2018-07-18 RX ORDER — LORAZEPAM 1 MG/1
1 TABLET ORAL EVERY 8 HOURS PRN
Qty: 20 TABLET | Refills: 0 | Status: SHIPPED | OUTPATIENT
Start: 2018-07-18 | End: 2018-12-10

## 2018-07-18 RX ORDER — ZOLPIDEM TARTRATE 5 MG/1
5 TABLET ORAL
Qty: 14 TABLET | Refills: 0 | Status: SHIPPED | OUTPATIENT
Start: 2018-07-18 | End: 2018-12-10

## 2018-07-18 RX ORDER — ETONOGESTREL AND ETHINYL ESTRADIOL VAGINAL RING .015; .12 MG/D; MG/D
RING VAGINAL
Qty: 3 EACH | Refills: 0 | Status: SHIPPED | OUTPATIENT
Start: 2018-07-18 | End: 2018-09-11

## 2018-07-18 ASSESSMENT — ANXIETY QUESTIONNAIRES
7. FEELING AFRAID AS IF SOMETHING AWFUL MIGHT HAPPEN: NOT AT ALL
3. WORRYING TOO MUCH ABOUT DIFFERENT THINGS: MORE THAN HALF THE DAYS
2. NOT BEING ABLE TO STOP OR CONTROL WORRYING: SEVERAL DAYS
1. FEELING NERVOUS, ANXIOUS, OR ON EDGE: MORE THAN HALF THE DAYS
6. BECOMING EASILY ANNOYED OR IRRITABLE: SEVERAL DAYS
5. BEING SO RESTLESS THAT IT IS HARD TO SIT STILL: NOT AT ALL
GAD7 TOTAL SCORE: 7

## 2018-07-18 ASSESSMENT — PATIENT HEALTH QUESTIONNAIRE - PHQ9: 5. POOR APPETITE OR OVEREATING: SEVERAL DAYS

## 2018-07-18 ASSESSMENT — PAIN SCALES - GENERAL: PAINLEVEL: NO PAIN (0)

## 2018-07-18 NOTE — PROGRESS NOTES
"      Progress Note    SUBJECTIVE:  Mervin Pacheco is an 23 year old, No obstetric history on file., who requests an Annual Preventive Exam.   PCP: Veena Figueroa (Family Practice MD)  Last Pap: NIL in 9/2015  Contraception: Nuva Ring  LMP: 6/18/2018    Student provider role described and offered to patient by rooming staff and patient's provider prior to visit. Patient verbalized consent to student participation in the clinic visit.     Concerns today include:  1. Episodic Anxiety related to upcoming wedding: Mervin reports mild intermittent anxiety related to wedding planning. NURY-7 score today is a 7. Describes feeling overwhelmed and being \"unable to calm down\" 1-2 days a week. Is very optimistic that this will resolve after wedding.   3. Medication Refill: Nuva Ring and Ambien. Mervin has been using the Nuva Ring since 5/2017 and reports satisfaction with this method. Periods are regular, LMP 6/18/2018. Has used Ambien as needed for insomnia in past, would like a refill today.   4. Preventative Health Care: Due for a pap smear, no history of abnormal paps. Had BG and cholesterol screening in 2016, WNL.     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 She is originally from [place] and is getting  there in 3 weeks. Planning to remain in [place] with .     Menstrual History:  Menstrual History 5/19/2017 10/31/2017 11/10/2017   LAST MENSTRUAL PERIOD 5/11/2017 10/17/2017 11/10/2017   Some encounter information is confidential and restricted. Go to Review Flowsheets activity to see all data.       Last    Lab Results   Component Value Date    PAP Negative 09/01/2015     History of abnormal Pap smear: NO - age 21-29 PAP every 3 years recommended    Mammogram current: not applicable    Last Colonoscopy: not applicable    HISTORY:    Current Outpatient Prescriptions on File Prior to Visit:  lidocaine, Anorectal, 5 % CREA Apply to vulvar " "area about 15 minutes prior to sex or up to 2x/day prn for pain   [DISCONTINUED] etonogestrel-ethinyl estradiol (NUVARING) 0.12-0.015 MG/24HR vaginal ring Insert 1 ring vaginally every 21 days then remove for 1 week then repeat with new ring.     No current facility-administered medications on file prior to visit.   No Known Allergies  Immunization History   Administered Date(s) Administered     HPV 2017, 2017     HPV9 2017     Influenza Vaccine IM 3yrs+ 4 Valent IIV4 2017     Tetanus 2012       Obstetric History       T0      L0     SAB0   TAB0   Ectopic0   Multiple0   Live Births0      History reviewed. No pertinent past medical history.  Past Surgical History:   Procedure Laterality Date     APPENDECTOMY      2002     ENT SURGERY      tonsils     Family History   Problem Relation Age of Onset     Hypertension Father      Hyperlipidemia Father      Diabetes Father      Stomach Cancer Maternal Grandmother      Breast Cancer Maternal Aunt      Social History     Social History     Marital status: Single     Spouse name: N/A     Number of children: N/A     Years of education: N/A     Social History Main Topics     Smoking status: Never Smoker     Smokeless tobacco: Never Used     Alcohol use Yes      Comment: less than 1x a week     Drug use: No     Sexual activity: Yes     Partners: Male     Birth control/ protection: Inserts/Ring     Other Topics Concern     Not on file     Social History Narrative       ROS  [unfilled]  PHQ-9 SCORE 2018   Total Score 1   Some encounter information is confidential and restricted. Go to Review Flowsheets activity to see all data.     NURY-7 SCORE 7/15/2018 2018   Total Score 10 (moderate anxiety) -   Total Score 10 7   Some encounter information is confidential and restricted. Go to Review Flowsheets activity to see all data.       EXAM:  Blood pressure 120/75, pulse 73, height 1.651 m (5' 5\"), weight 70.3 kg (155 lb), last " menstrual period 06/21/2018, not currently breastfeeding. Body mass index is 25.79 kg/(m^2).  General - pleasant female in no acute distress.  Skin - no suspicious lesions or rashes  EENT-  PERRLA, euthyroid with out palpable nodules  Neck - supple without lymphadenopathy.  Lungs - clear to auscultation bilaterally.  Heart - regular rate and rhythm without murmur.  Abdomen - soft, nontender, nondistended, no masses or organomegaly noted.  Musculoskeletal - no gross deformities.  Neurological - normal strength, sensation, and mental status.    Breast Exam:  Breast: Without visible skin changes. No dimpling or lesions seen.   Breasts supple, non-tender with palpation, no dominant mass, nodularity, or nipple discharge noted bilaterally. Axillary nodes negative.      Pelvic Exam:  EG/BUS: Pubic hair shaven. Normal genital architecture without lesions, erythema or abnormal secretions Bartholin's, Urethra, Colwell's normal   Urethral meatus: normal   Urethra: no masses, tenderness, or scarring   Bladder: no masses or tenderness   Vagina: moist, pink, rugae with creamy, white and odorless  secretions  Cervix: Nulliparous, and pink, moist, closed, without lesion or CMT  Uterus: anteverted,  and small, smooth, firm, mobile w/o pain  Adnexa: Within normal limits and No masses, nodularity, tenderness  Rectum:anus normal     ASSESSMENT:  Encounter Diagnoses   Name Primary?     Encounter for initial prescription of vaginal ring hormonal contraceptive      Visit for preventive health examination Yes     Anxiety      Insomnia, unspecified type         PLAN:   Orders Placed This Encounter   Procedures     Obtaining, preparing and conveyance of cervical or vaginal smear to laboratory.     Pap imaged thin layer screen reflex to HPV if ASCUS - recommended age 25 - 29 years     Orders Placed This Encounter   Medications     etonogestrel-ethinyl estradiol (NUVARING) 0.12-0.015 MG/24HR vaginal ring     Sig: Insert 1 ring vaginally every 21  days then remove for 1 week then repeat with new ring.     Dispense:  3 each     Refill:  0     zolpidem (AMBIEN) 5 MG tablet     Sig: Take 1 tablet (5 mg) by mouth nightly as needed for sleep     Dispense:  14 tablet     Refill:  0     LORazepam (ATIVAN) 1 MG tablet     Sig: Take 1 tablet (1 mg) by mouth every 8 hours as needed for anxiety     Dispense:  20 tablet     Refill:  0       Additional teaching done at this visit regarding calcium (1200 mg per day), self breast exam, exercise, birth control, mental health and weight/diet.    1) Pap Smear Results via MyChart  2) Ambien refilled, start Ativan as needed for episodic anxiety related to upcoming wedding, no refills. Do not take these together, as discussed.   3) Nuva Ring refilled for one year.   4) Return to clinic in one year for well-woman annual exam. Follow-up as needed.    I, Halima Vieyra completed the PFSH and ROS. I then acted as a scribe for CHAY Davis, for the remainder of the visit.  Halima Vieyra, CHAY Student    I agree with the PFSH and ROS as completed by the CHAY Student, except for changes made by me.  The remainder of the encounter was performed by me and scribed by the CHAY Student.  The scribed note accurately reflects my personal services and decisions made by me.    Ivette Roberson DNP, APRN, CHAY    Answers for HPI/ROS submitted by the patient on 7/15/2018   NURY 7 TOTAL SCORE: 10  PHQ-2 Score: 0  General Symptoms: No  Skin Symptoms: No  HENT Symptoms: No  EYE SYMPTOMS: Yes  HEART SYMPTOMS: No  LUNG SYMPTOMS: No  INTESTINAL SYMPTOMS: No  URINARY SYMPTOMS: No  GYNECOLOGIC SYMPTOMS: Yes  BREAST SYMPTOMS: No  SKELETAL SYMPTOMS: No  BLOOD SYMPTOMS: No  NERVOUS SYSTEM SYMPTOMS: No  MENTAL HEALTH SYMPTOMS: Yes  Eye pain: No  Vision loss: No  Dry eyes: No  Watery eyes: No  Eye bulging: No  Double vision: No  Flashing of lights: No  Spots: No  Floaters: No  Redness: Yes  Crossed eyes: No  Tunnel Vision: No  Yellowing of eyes:  No  Eye irritation: No  Bleeding or spotting between periods: No  Heavy or painful periods: No  Irregular periods: No  Vaginal discharge: No  Hot flashes: No  Vaginal dryness: Yes  Genital ulcers: No  Reduced libido: Yes  Painful intercourse: Yes  Difficulty with sexual arousal: No  Post-menopausal bleeding: No  Nervous or Anxious: Yes  Depression: No  Trouble sleeping: Yes  Trouble thinking or concentrating: No  Mood changes: Yes  Panic attacks: No

## 2018-07-18 NOTE — PATIENT INSTRUCTIONS
1) Pap Smear Results via MyChart  2) Ambien refilled, start Ativan as needed for episodic anxiety related to upcoming wedding. Do not take these together, as discussed.   3) Nuva Ring refilled for one year.   4) Return to clinic in one year for well-woman annual exam.     PREVENTIVE HEALTH RECOMMENDATIONS:   Most women need a yearly breast and pelvic exam.    A PAP screen, a test done DURING a pelvic exam, is NO longer recommended yearly.    March 2013, screening guidelines recommended by ACOG for PAP screen are:    1) First pap at age 21.    2) Pap every 3 years until age 30.    3) After age 30, pap every 3 years or Pap with HR HPV screen every 5 years until age 65.  4) Women do NOT need a vaginal Pap screen after a hysterectomy (surgical removal of the uterus) when they have not had cancer.    Exceptions:  1) Yearly pap if HIV+ or immunosuppressed secondary to organ transplant  2) OSCAR II-III continue routine screening for 20 years.    I encourage you continue looking for opportunities to choose a healthy lifestyle:       * Choose to eat a heart healthy diet. Check out the FOOD PLATE guidelines at: http://www.choosemyplate.gov/ for helpful hints on weight and cholesterol management.  Balance your caloric intake with exercise to maintain a BMI in the 22 to 26 range. For bone health: Eat calcium-rich foods like yogurt, broccoli or take chewable calcium pills (500 to 600 mg) twice a day with food.       * Exercise for at least an average of 30 minutes a day, 5 days of the week. This will help you control your weight, release stress, and help prevent disease.      * Take a Vitamin D3 supplement daily fall through spring and during summer unless you [id number]' full body sun exposure to skin without sunscreen.      * DO wear sunscreen to prevent skin cancer after the first 15-30 minutes.      * Identify stressors in your life, find ways to release the stress, and, make time for yourself. PLEASE ask for help if mood  changes last longer than two weeks.     * Limit alcohol to one drink per day.  No smoking.  Avoid second hand smoke. If you smoke, ask for help to stop.       *  If you are in a sexual relationship, talk with your partner about possible infection risks and take action to protect yourself from exposure to a sexual infection.    Please request an infection screen for STIs (sexually transmitted infections) if you are less than age 26 OR believe that you may be at risk.     Get a flu shot each year. Get a tetanus shot every 10 years. EVERYONE needs a pertussis (Whooping cough) booster.    See your dentist twice a year for an exam and preventive care cleaning.     Consider the following screen tests:    1) cholesterol test every 5 years.     2) yearly mammogram after age 40 unless you have identified risks.    3) colonoscopy every 10 years after age 50 unless you have identified risks.    4) diabetes blood test screening if you are at risk for diabetes.      Additional information that you may also find helpful:  The Internet now gives us access to LOTS of information -- some of it helpful, research documented and also plenty of harmful, anecdotal information that may not pertain to your situtaion. Consider visiting the following websites for accurate health information:    www.vitamindcouncil.org/ : Info and ongoing research re Vitamin D    www.fairview.org : Up to date and easily searchable information on multiple topics.    www.medlineplus.gov : medication info, interactive tutorials, watch real surgeries online    www.cdc.gov : public health info, travel advisories, epidemics (H1N1)    www.purnima/std.org: current research re diagnosis, treatment and prevention of sexually contacted infections.    www.health.[facility].[place].us : [place] dept of heatlh, public health issues in [place], N1N1    www.familydoctor.org : good info from the Academy of Family Physicians

## 2018-07-18 NOTE — MR AVS SNAPSHOT
After Visit Summary   7/18/2018    Mervin Pacheco    MRN: [id number]           Patient Information     Date Of Birth          1995        Visit Information        Provider Department      7/18/2018 8:40 AM Ivette Roberson APRN CNP [facility]        Today's Diagnoses     Visit for preventive health examination    -  1    Encounter for initial prescription of vaginal ring hormonal contraceptive        Anxiety        Insomnia, unspecified type          Care Instructions    1) Pap Smear Results via MyChart  2) Ambien refilled, start Ativan as needed for episodic anxiety related to upcoming wedding. Do not take these together, as discussed.   3) Nuva Ring refilled for one year.   4) Return to clinic in one year for well-woman annual exam.    PREVENTIVE HEALTH RECOMMENDATIONS:   Most women need a yearly breast and pelvic exam.    A PAP screen, a test done DURING a pelvic exam, is NO longer recommended yearly.    March 2013, screening guidelines recommended by ACOG for PAP screen are:    1) First pap at age 21.    2) Pap every 3 years until age 30.    3) After age 30, pap every 3 years or Pap with HR HPV screen every 5 years until age 65.  4) Women do NOT need a vaginal Pap screen after a hysterectomy (surgical removal of the uterus) when they have not had cancer.    Exceptions:  1) Yearly pap if HIV+ or immunosuppressed secondary to organ transplant  2) OSCAR II-III continue routine screening for 20 years.    I encourage you continue looking for opportunities to choose a healthy lifestyle:       * Choose to eat a heart healthy diet. Check out the FOOD PLATE guidelines at: http://www.choosemyplate.gov/ for helpful hints on weight and cholesterol management.  Balance your caloric intake with exercise to maintain a BMI in the 22 to 26 range. For bone health: Eat calcium-rich foods like yogurt, broccoli or take chewable calcium pills (500 to 600 mg) twice a day with food.       *  Exercise for at least an average of 30 minutes a day, 5 days of the week. This will help you control your weight, release stress, and help prevent disease.      * Take a Vitamin D3 supplement daily fall through spring and during summer unless you jaof44-10' full body sun exposure to skin without sunscreen.      * DO wear sunscreen to prevent skin cancer after the first 15-30 minutes.      * Identify stressors in your life, find ways to release the stress, and, make time for yourself. PLEASE ask for help if mood changes last longer than two weeks.     * Limit alcohol to one drink per day.  No smoking.  Avoid second hand smoke. If you smoke, ask for help to stop.       *  If you are in a sexual relationship, talk with your partner about possible infection risks and take action to protect yourself from exposure to a sexual infection.    Please request an infection screen for STIs (sexually transmitted infections) if you are less than age 26 OR believe that you may be at risk.     Get a flu shot each year. Get a tetanus shot every 10 years. EVERYONE needs a pertussis (Whooping cough) booster.    See your dentist twice a year for an exam and preventive care cleaning.     Consider the following screen tests:    1) cholesterol test every 5 years.     2) yearly mammogram after age 40 unless you have identified risks.    3) colonoscopy every 10 years after age 50 unless you have identified risks.    4) diabetes blood test screening if you are at risk for diabetes.      Additional information that you may also find helpful:  The Internet now gives us access to LOTS of information -- some of it helpful, research documented and also plenty of harmful, anecdotal information that may not pertain to your situtaion. Consider visiting the following websites for accurate health information:    www.vitamindcouncil.org/ : Info and ongoing research re Vitamin D    www.fairview.org : Up to date and easily searchable information on  multiple topics.    www.medlineplus.gov : medication info, interactive tutorials, watch real surgeries online    www.cdc.gov : public health info, travel advisories, epidemics (H1N1)    www.purnima/std.org: current research re diagnosis, treatment and prevention of sexually contacted infections.    www.health.state.[place].us : [place] dept [facility], public health issues in [place], N1N1    www.familydoctor.org : good info from the Academy of Family Physicians                Follow-ups after your visit        Follow-up notes from your care team     Return in 1 year (on 7/18/2019) for Preventative Health Visit.      Your next 10 appointments already scheduled     Sep 24, 2018  8:00 AM CDT   Return Visit with Angel Sutton MD   [facility] ([facility])    [address] [facility] [address]  [address]  [facility] [address]   [phone]              Who to contact     Please call your clinic at [phone] to:    Ask questions about your health    Make or cancel appointments    Discuss your medicines    Learn about your test results    Speak to your doctor            Additional Information About Your Visit        [employer] Information     [employer] gives you secure access to your electronic health record. If you see a primary care provider, you can also send messages to your care team and make appointments. If you have questions, please call your primary care clinic.  If you do not have a primary care provider, please call [phone] and they will assist you.      [employer] is an electronic gateway that provides easy, online access to your medical records. With [employer], you can request a clinic appointment, read your test results, renew a prescription or communicate with your care team.     To access your existing account, please contact your [facility] or call [phone] for assistance.        Care EveryWhere ID     This is your Care EveryWhere ID. This could be used by other  "organizations to access your [place] medical records  [id number]        Your Vitals Were     Pulse Height Last Period Breastfeeding? BMI (Body Mass Index)       73 1.651 m (5' 5\") 06/21/2018 No 25.79 kg/m2        Blood Pressure from Last 3 Encounters:   07/18/18 120/75   06/26/18 108/69   04/10/18 119/75    Weight from Last 3 Encounters:   07/18/18 70.3 kg (155 lb)   06/26/18 70.3 kg (155 lb)   04/10/18 74.8 kg (165 lb)              We Performed the Following     Obtaining, preparing and conveyance of cervical or vaginal smear to laboratory.     Pap imaged thin layer screen reflex to HPV if ASCUS - recommended age 25 - 29 years          Today's Medication Changes          These changes are accurate as of 7/18/18  9:24 AM.  If you have any questions, ask your nurse or doctor.               Start taking these medicines.        Dose/Directions    LORazepam 1 MG tablet   Commonly known as:  ATIVAN   Used for:  Anxiety   Started by:  Ivette Roberson APRN CNP        Dose:  1 mg   Take 1 tablet (1 mg) by mouth every 8 hours as needed for anxiety   Quantity:  20 tablet   Refills:  0            Where to get your medicines      These medications were sent to [facility] - [place], [place] - [address] AT [address] [facility] [address]  [address] [facility] [address]     Phone:  [phone]     etonogestrel-ethinyl estradiol 0.12-0.015 MG/24HR vaginal ring         Some of these will need a paper prescription and others can be bought over the counter.  Ask your nurse if you have questions.     Bring a paper prescription for each of these medications     LORazepam 1 MG tablet    zolpidem 5 MG tablet                Primary Care Provider Office Phone # Fax #    Deqa Ventura Figueroa [phone] [address]  [facility] [address]        Equal Access to Services     EDWARD MUIR AH: Melissa Benites, aly barillas, suha frye, " ifrah ivyletha rico'aan ah. So [facility] [phone].    ATENCIÓN: Si doris love, tiene a mejía disposición servicios gratuitos de asistencia lingüística. Ernestina munoz [phone].    We comply with applicable federal civil rights laws and [place] laws. We do not discriminate on the basis of race, color, national origin, age, disability, sex, sexual orientation, or gender identity.            Thank you!     Thank you for choosing [facility]  for your care. Our goal is always to provide you with excellent care. Hearing back from our patients is one way we can continue to improve our services. Please take a few minutes to complete the written survey that you may receive in the mail after your visit with us. Thank you!             Your Updated Medication List - Protect others around you: Learn how to safely use, store and throw away your medicines at www.disposemymeds.org.          This list is accurate as of 7/18/18  9:24 AM.  Always use your most recent med list.                   Brand Name Dispense Instructions for use Diagnosis    etonogestrel-ethinyl estradiol 0.12-0.015 MG/24HR vaginal ring    NUVARING    3 each    Insert 1 ring vaginally every 21 days then remove for 1 week then repeat with new ring.    Encounter for initial prescription of vaginal ring hormonal contraceptive       lidocaine (Anorectal) 5 % Crea     45 g    Apply to vulvar area about 15 minutes prior to sex or up to 2x/day prn for pain    Vulvodynia       LORazepam 1 MG tablet    ATIVAN    20 tablet    Take 1 tablet (1 mg) by mouth every 8 hours as needed for anxiety    Anxiety       zolpidem 5 MG tablet    AMBIEN    14 tablet    Take 1 tablet (5 mg) by mouth nightly as needed for sleep    Insomnia, unspecified type

## 2018-07-18 NOTE — LETTER
"7/18/2018       RE: Mervin Pacheco  [address]  [facility] [address]     Dear Colleague,    Thank you for referring your patient, Mervin Pacheco, to the [facility] at [facility]. Please see a copy of my visit note below.          Progress Note    SUBJECTIVE:  Mervin Pacheco is an 23 year old, No obstetric history on file., who requests an Annual Preventive Exam.   PCP: Veena Figueroa (Family Practice MD)  Last Pap: NIL in 9/2015  Contraception: Nuva Ring  LMP: 6/18/2018    Student provider role described and offered to patient by rooming staff and patient's provider prior to visit. Patient verbalized consent to student participation in the clinic visit.     Concerns today include:  1. Episodic Anxiety related to upcoming wedding: Mervin reports mild intermittent anxiety related to wedding planning. NURY-7 score today is a 7. Describes feeling overwhelmed and being \"unable to calm down\" 1-2 days a week. Is very optimistic that this will resolve after wedding.   3. Medication Refill: Nuva Ring and Ambien. Mervin has been using the Nuva Ring since 5/2017 and reports satisfaction with this method. Periods are regular, LMP 6/18/2018. Has used Ambien as needed for insomnia in past, would like a refill today.   4. Preventative Health Care: Due for a pap smear, no history of abnormal paps. Had BG and cholesterol screening in 2016, WNL.     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 She is originally from [place] and is getting  there in 3 weeks. Planning to remain in [place] with .     Menstrual History:  Menstrual History 5/19/2017 10/31/2017 11/10/2017   LAST MENSTRUAL PERIOD 5/11/2017 10/17/2017 11/10/2017   Some encounter information is confidential and restricted. Go to Review Flowsheets activity to see all data.       Last    Lab Results   Component Value Date    " PAP Negative 2015     History of abnormal Pap smear: NO - age 21-29 PAP every 3 years recommended    Mammogram current: not applicable    Last Colonoscopy: not applicable    HISTORY:    Current Outpatient Prescriptions on File Prior to Visit:  lidocaine, Anorectal, 5 % CREA Apply to vulvar area about 15 minutes prior to sex or up to 2x/day prn for pain   [DISCONTINUED] etonogestrel-ethinyl estradiol (NUVARING) 0.12-0.015 MG/24HR vaginal ring Insert 1 ring vaginally every 21 days then remove for 1 week then repeat with new ring.     No current facility-administered medications on file prior to visit.   No Known Allergies  Immunization History   Administered Date(s) Administered     HPV 2017, 2017     HPV9 2017     Influenza Vaccine IM 3yrs+ 4 Valent IIV4 2017     Tetanus 2012       Obstetric History       T0      L0     SAB0   TAB0   Ectopic0   Multiple0   Live Births0      History reviewed. No pertinent past medical history.  Past Surgical History:   Procedure Laterality Date     APPENDECTOMY      2002     ENT SURGERY  2011    tonsils     Family History   Problem Relation Age of Onset     Hypertension Father      Hyperlipidemia Father      Diabetes Father      Stomach Cancer Maternal Grandmother      Breast Cancer Maternal Aunt      Social History     Social History     Marital status: Single     Spouse name: N/A     Number of children: N/A     Years of education: N/A     Social History Main Topics     Smoking status: Never Smoker     Smokeless tobacco: Never Used     Alcohol use Yes      Comment: less than 1x a week     Drug use: No     Sexual activity: Yes     Partners: Male     Birth control/ protection: Inserts/Ring     Other Topics Concern     Not on file     Social History Narrative       ROS  [unfilled]  PHQ-9 SCORE 2018   Total Score 1   Some encounter information is confidential and restricted. Go to Review Flowsheets activity to see all data.     NURY-7  "SCORE 7/15/2018 7/18/2018   Total Score 10 (moderate anxiety) -   Total Score 10 7   Some encounter information is confidential and restricted. Go to Review Flowsheets activity to see all data.       EXAM:  Blood pressure 120/75, pulse 73, height 1.651 m (5' 5\"), weight 70.3 kg (155 lb), last menstrual period 06/21/2018, not currently breastfeeding. Body mass index is 25.79 kg/(m^2).  General - pleasant female in no acute distress.  Skin - no suspicious lesions or rashes  EENT-  PERRLA, euthyroid with out palpable nodules  Neck - supple without lymphadenopathy.  Lungs - clear to auscultation bilaterally.  Heart - regular rate and rhythm without murmur.  Abdomen - soft, nontender, nondistended, no masses or organomegaly noted.  Musculoskeletal - no gross deformities.  Neurological - normal strength, sensation, and mental status.    Breast Exam:  Breast: Without visible skin changes. No dimpling or lesions seen.   Breasts supple, non-tender with palpation, no dominant mass, nodularity, or nipple discharge noted bilaterally. Axillary nodes negative.      Pelvic Exam:  EG/BUS: Pubic hair shaven. Normal genital architecture without lesions, erythema or abnormal secretions Bartholin's, Urethra, Prewitt's normal   Urethral meatus: normal   Urethra: no masses, tenderness, or scarring   Bladder: no masses or tenderness   Vagina: moist, pink, rugae with creamy, white and odorless  secretions  Cervix: Nulliparous, and pink, moist, closed, without lesion or CMT  Uterus: anteverted,  and small, smooth, firm, mobile w/o pain  Adnexa: Within normal limits and No masses, nodularity, tenderness  Rectum:anus normal     ASSESSMENT:  Encounter Diagnoses   Name Primary?     Encounter for initial prescription of vaginal ring hormonal contraceptive      Visit for preventive health examination Yes     Anxiety      Insomnia, unspecified type         PLAN:   Orders Placed This Encounter   Procedures     Obtaining, preparing and conveyance of " cervical or vaginal smear to laboratory.     Pap imaged thin layer screen reflex to HPV if ASCUS - recommended age 25 - 29 years     Orders Placed This Encounter   Medications     etonogestrel-ethinyl estradiol (NUVARING) 0.12-0.015 MG/24HR vaginal ring     Sig: Insert 1 ring vaginally every 21 days then remove for 1 week then repeat with new ring.     Dispense:  3 each     Refill:  0     zolpidem (AMBIEN) 5 MG tablet     Sig: Take 1 tablet (5 mg) by mouth nightly as needed for sleep     Dispense:  14 tablet     Refill:  0     LORazepam (ATIVAN) 1 MG tablet     Sig: Take 1 tablet (1 mg) by mouth every 8 hours as needed for anxiety     Dispense:  20 tablet     Refill:  0       Additional teaching done at this visit regarding calcium (1200 mg per day), self breast exam, exercise, birth control, mental health and weight/diet.    1) Pap Smear Results via MyChart  2) Ambien refilled, start Ativan as needed for episodic anxiety related to upcoming wedding, no refills. Do not take these together, as discussed.   3) Nuva Ring refilled for one year.   4) Return to clinic in one year for well-woman annual exam. Follow-up as needed.    I, Halima Vieyra completed the PFSH and ROS. I then acted as a scribe for CHAY Davis, for the remainder of the visit.  Halima Vieyra, CHAY Student    I agree with the PFSH and ROS as completed by the CHAY Student, except for changes made by me.  The remainder of the encounter was performed by me and scribed by the CHAY Student.  The scribed note accurately reflects my personal services and decisions made by me.    Again, thank you for allowing me to participate in the care of your patient.      Sincerely,    OCTAVIO Collazo CNP

## 2018-07-19 ASSESSMENT — PATIENT HEALTH QUESTIONNAIRE - PHQ9: SUM OF ALL RESPONSES TO PHQ QUESTIONS 1-9: 1

## 2018-07-20 LAB
COPATH REPORT: NORMAL
PAP: NORMAL

## 2018-07-23 ENCOUNTER — DOCUMENTATION ONLY (OUTPATIENT)
Dept: OTHER | Facility: OUTPATIENT CENTER | Age: 23
End: 2018-07-23

## 2018-07-23 NOTE — PROGRESS NOTES
Program in Human Sexuality  [facility]  [address], [address]  [place] [address]  Outpatient Progress Note      Session Date: 7/12/18  Client Name: Mervin Pacheco  YOB: 1995  MRN: [id number]  Provider: Isha Bajwa, PhD   Type of Session: Family therapy  Present in Session: Client and client's Miguelito kuo  Number of Minutes: 55   Treatment Plan Due: 02/22/2019      Current Symptoms/Status:   Mervin and her fianceMiguelito, sought family therapy to address several sexual concerns, including desire discrepancy in the relationship, difficulties with arousal and sexual communication, and a history of pain for Mervin with penetrative sex (and possible semen protein allergy). At intake, Mervin reported appearance related anxiety and noted that body shame and low self-esteem may relate to her sexual concerns. Treatment goals include increased desire for and frequency of partnered sex, more communication during sex, and help addressing sexual pain. Desire discrepancy issues have resolved since intake, especially given improvement of sexual communication and decrease in pelvic/genital pain.       Progress Toward Treatment Goals:   Sexual medicine exam and pelvic PT completed (May 2018) with decrease, particularly in internal pain; Family communicating more openly about sex and sexuality and exploring sexual needs/desires together      Intervention & Response:   Interpersonal, CBT, and family systems/family therapy techniques utilized to address family's current status and engage in termination session. Per last session, therapist engaged family in processing their reactions to the Sexual Interests Checklist. Partners shared about what they learned regarding each others' sexual interests and how this felt. The family was able to openly and with curiousity explore their varying interest in power play, with Mervin acknowledging a tendency toward submission and/or desire for Miguelito  "to be more dominant in sexual encounters (\"call the shots\"). Partners were able to discuss reservations about moving forward with sexual exploration in this way, as well as to negotiate ways in which it would feel most comfortable. Praised and reinforced excellent communication/listening skills. Per last session, family has decided to discontinue therapy at this time. Engaged partners in a review and processing of changes in their relationship and individual experiences related to sexuality, as well as a discussion about how they have met therapeutic goals. Mervin shared excitement that sexual desire has returned and gratitude that she and her partner have continued to build on their communication skills with a focus on sexuality. Identified that pain experiences feel fairly well-controlled and, on high pain days, the family feels easily able to negotiate and alter sexual plans to prioritize intimacy. Both partners reported feeling positive and confident about this. Mervin identified that she will continue to explore medication management options for pain with Dr. Sutton. Family noted feeling excited about their upcoming wedding, while also engaging in realistic expectations about sexuality. Therapist and family took time at session end to exchange appreciation and well wishes before saying goodbye. Partners were reminded that they are welcome to return to this clinic in the future, should a need arise. Overall, both partners were open to feedback, active, and engaged throughout session.      Assignment:   (1). Family: Continue to adhere to set boundaries related to painful sex  (2). Mervin: Continue at home pelvic PT exercises. Obtain and begin reading \"Sex for One\" by Jacy Taylor.       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 Mervin will continue medical management of pain with Dr. Angel Sutton.             Isha Bajwa, PhD,   Licensed Psychologist

## 2018-07-23 NOTE — PROGRESS NOTES
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     Date of Summary: 7/23/2018   Date of Initial [facility] Appointment: 2/07/2018  Date of Last [facility] Appointment: 7/12/2018    Evaluation of Client Status    Total number of Individual, conjoint, group sessions patient attended:  Intake + 6 family sessions with Dr. Isha Bajwa; Sexual medicine evaluation and follow-up appointments with Dr. Angel Sutton    Goals at intake were:   Assess and provide treatment suggestions  Prepare to begin family therapy focused on sexual health    At the time of discharge, the client reported the following progress:  Increased insight  Changes in behavior  Changes in cognitions  Increased sexual functioning  Better interpersonal relationships    Therapist Assessment:  Pelvic physical therapy completed (May 2018) with decrease particularly in internal pain; Increased flexibility and openness to sexual activities so as to maintain/increase partnered intimacy without triggering genital pain; Mervin continues to explore medication management options for external, vulvar pain with Dr. Sutton. Family communicating more openly about sex and sexuality and exploring sexual needs/desires together. Family reports increased understanding of each partner's needs, as well as greater confidence speaking about sexuality-related topics.     Prognosis:    Good    Referred to: Family feels they have met therapeutic goals and have decided to discontinue family therapy at this time. Partners were encouraged to return to therapy should a need arise in the future. Mervin reported that she plans to continue medical management of pain with Dr. Sutton.         Isha Bajwa, PhD, LP  Licensed Psychologist

## 2018-07-26 NOTE — PROGRESS NOTES
Dear Mervin,    Your pap smear was normal.  Your next pap would be due in 3 years, but we recommend an annual well woman health visit.    Sincerely,    Ivette CARTWRIGHT

## 2018-09-11 ENCOUNTER — MYC REFILL (OUTPATIENT)
Dept: OBGYN | Facility: CLINIC | Age: 23
End: 2018-09-11

## 2018-09-11 DIAGNOSIS — Z30.015 ENCOUNTER FOR INITIAL PRESCRIPTION OF VAGINAL RING HORMONAL CONTRACEPTIVE: ICD-10-CM

## 2018-09-11 RX ORDER — ETONOGESTREL AND ETHINYL ESTRADIOL VAGINAL RING .015; .12 MG/D; MG/D
RING VAGINAL
Qty: 3 EACH | Refills: 3 | Status: SHIPPED | OUTPATIENT
Start: 2018-09-11 | End: 2019-10-07

## 2018-09-11 NOTE — TELEPHONE ENCOUNTER
Message from [employer]:  Original authorizing provider: OCTAVIO Collazo CNP would like a refill of the following medications:  etonogestrel-ethinyl estradiol (NUVARING) 0.12-0.015 MG/24HR vaginal ring [Ivette Roberson, APRN CNP]    Preferred pharmacy: [facility] - [place], [place] - [address] AT [address]    Comment:  I forgot my prescription on vacation in [place] and need a refill- thank you!

## 2018-09-17 ENCOUNTER — MYC MEDICAL ADVICE (OUTPATIENT)
Dept: FAMILY MEDICINE | Facility: CLINIC | Age: 23
End: 2018-09-17

## 2018-09-17 NOTE — TELEPHONE ENCOUNTER
Writer reviewed 9/11/18 MyChart Refill encounter.    Writer responded as per below.  CHERYLE CheneyN, RN

## 2018-09-25 ENCOUNTER — TELEPHONE (OUTPATIENT)
Dept: FAMILY MEDICINE | Facility: CLINIC | Age: 23
End: 2018-09-25

## 2018-09-25 NOTE — TELEPHONE ENCOUNTER
Central Prior Authorization Team   [phone]    PA Initiation    Medication: Nuvaring Insert ring vaginally every 21  Insurance Company: [employer] - Phone [phone] Fax [phone]  Ph[facility] Filling the Rx: [facility] - [place], [place] - [address]  Filling Pharmacy Phone: [phone]  Filling Pharmacy Fax:    Start Date: 9/25/2018

## 2018-09-25 NOTE — TELEPHONE ENCOUNTER
Prior Authorization Retail Medication Request    Medication/Dose: Nuvaring Insert ring vaginally every 21  ICD code (if different than what is on RX):     Previously Tried and Failed:     Rationale:       Insurance Name:     Insurance ID:         Pharmacy Information (if different than what is on RX)  Name:  Flor Gaitan  Phone:  [phone]

## 2018-09-26 NOTE — TELEPHONE ENCOUNTER
Prior Authorization Not Needed per Insurance    Medication: Nuvaring Insert ring vaginally every 21  Insurance Company: [employer] - Phone [phone] Fax [phone]  Expected CoPay:      Pharmacy Filling the Rx: [facility] - [place], [place] - [address]  Pharmacy Notified: Yes  Patient Notified: Yes    Called the pharmacy and pharmacy stated that PA is not needed. A lost medication override is required for patient prescription. Pharmacy will work on override and/or call their pharmacy help desk to get medication process. Insurance states that PA is not needed.

## 2018-12-10 ENCOUNTER — OFFICE VISIT (OUTPATIENT)
Dept: FAMILY MEDICINE | Facility: CLINIC | Age: 23
End: 2018-12-10
Payer: COMMERCIAL

## 2018-12-10 VITALS
HEART RATE: 80 BPM | TEMPERATURE: 98.9 F | RESPIRATION RATE: 16 BRPM | SYSTOLIC BLOOD PRESSURE: 115 MMHG | HEIGHT: 65 IN | OXYGEN SATURATION: 98 % | DIASTOLIC BLOOD PRESSURE: 64 MMHG | WEIGHT: 169.75 LBS | BODY MASS INDEX: 28.28 KG/M2

## 2018-12-10 DIAGNOSIS — Z23 NEED FOR PROPHYLACTIC VACCINATION AND INOCULATION AGAINST INFLUENZA: ICD-10-CM

## 2018-12-10 DIAGNOSIS — R23.9 SKIN CHANGE: ICD-10-CM

## 2018-12-10 DIAGNOSIS — F41.9 ANXIETY: Primary | ICD-10-CM

## 2018-12-10 DIAGNOSIS — K21.9 GASTROESOPHAGEAL REFLUX DISEASE, ESOPHAGITIS PRESENCE NOT SPECIFIED: ICD-10-CM

## 2018-12-10 PROCEDURE — 90471 IMMUNIZATION ADMIN: CPT | Performed by: FAMILY MEDICINE

## 2018-12-10 PROCEDURE — 99214 OFFICE O/P EST MOD 30 MIN: CPT | Mod: 25 | Performed by: FAMILY MEDICINE

## 2018-12-10 PROCEDURE — 90686 IIV4 VACC NO PRSV 0.5 ML IM: CPT | Performed by: FAMILY MEDICINE

## 2018-12-10 RX ORDER — PROPRANOLOL HYDROCHLORIDE 20 MG/1
10-20 TABLET ORAL 3 TIMES DAILY PRN
Qty: 60 TABLET | Refills: 0 | Status: SHIPPED | OUTPATIENT
Start: 2018-12-10 | End: 2021-01-11

## 2018-12-10 RX ORDER — HYDROXYZINE HYDROCHLORIDE 25 MG/1
25 TABLET, FILM COATED ORAL EVERY 6 HOURS PRN
Qty: 60 TABLET | Refills: 0 | Status: SHIPPED | OUTPATIENT
Start: 2018-12-10 | End: 2021-01-11

## 2018-12-10 RX ORDER — ETONOGESTREL AND ETHINYL ESTRADIOL VAGINAL RING .015; .12 MG/D; MG/D
RING VAGINAL
Qty: 3 EACH | Refills: 3 | Status: CANCELLED | OUTPATIENT
Start: 2018-12-10

## 2018-12-10 RX ORDER — LORAZEPAM 0.5 MG/1
0.5 TABLET ORAL EVERY 8 HOURS PRN
Qty: 20 TABLET | Refills: 0 | Status: SHIPPED | OUTPATIENT
Start: 2018-12-10 | End: 2019-10-25

## 2018-12-10 ASSESSMENT — ANXIETY QUESTIONNAIRES
IF YOU CHECKED OFF ANY PROBLEMS ON THIS QUESTIONNAIRE, HOW DIFFICULT HAVE THESE PROBLEMS MADE IT FOR YOU TO DO YOUR WORK, TAKE CARE OF THINGS AT HOME, OR GET ALONG WITH OTHER PEOPLE: SOMEWHAT DIFFICULT
5. BEING SO RESTLESS THAT IT IS HARD TO SIT STILL: NOT AT ALL
2. NOT BEING ABLE TO STOP OR CONTROL WORRYING: SEVERAL DAYS
6. BECOMING EASILY ANNOYED OR IRRITABLE: SEVERAL DAYS
GAD7 TOTAL SCORE: 7
7. FEELING AFRAID AS IF SOMETHING AWFUL MIGHT HAPPEN: NOT AT ALL
1. FEELING NERVOUS, ANXIOUS, OR ON EDGE: MORE THAN HALF THE DAYS
3. WORRYING TOO MUCH ABOUT DIFFERENT THINGS: SEVERAL DAYS

## 2018-12-10 ASSESSMENT — ENCOUNTER SYMPTOMS
CONSTIPATION: 0
HEADACHES: 1
DIZZINESS: 0
BREAST MASS: 0
SHORTNESS OF BREATH: 0
PARESTHESIAS: 0
DIARRHEA: 0
EYE PAIN: 0
FEVER: 0
SORE THROAT: 0
DYSURIA: 0
HEMATURIA: 0
NERVOUS/ANXIOUS: 1
NAUSEA: 0
ABDOMINAL PAIN: 0
JOINT SWELLING: 0
ARTHRALGIAS: 0
HEMATOCHEZIA: 0
FREQUENCY: 0
HEARTBURN: 1
PALPITATIONS: 0
CHILLS: 0
WEAKNESS: 0
COUGH: 0
MYALGIAS: 0

## 2018-12-10 ASSESSMENT — PATIENT HEALTH QUESTIONNAIRE - PHQ9
5. POOR APPETITE OR OVEREATING: MORE THAN HALF THE DAYS
SUM OF ALL RESPONSES TO PHQ QUESTIONS 1-9: 10

## 2018-12-10 ASSESSMENT — MIFFLIN-ST. JEOR: SCORE: 1507.92

## 2018-12-10 NOTE — PROGRESS NOTES
"   SUBJECTIVE:   CC: Mervin Pacheco is an 23 year old woman who presents for preventive health visit.     HPI  {Add if <65 person on Medicare  - Required Questions (Optional):930722}  {Outside tests to abstract? :836272}    {additional problems to add (Optional):628263}    Today's PHQ-2 Score:   PHQ-2 ( 1999 [employer]) 7/15/2018   Q1: Little interest or pleasure in doing things 0   Q2: Feeling down, depressed or hopeless 0   PHQ-2 Score 0   Q1: Little interest or pleasure in doing things Not at all   Q2: Feeling down, depressed or hopeless Not at all   PHQ-2 Score 0       Abuse: Current or Past(Physical, Sexual or Emotional)- {YES/NO/NA:787231}  Do you feel safe in your environment? {YES/NO/NA:896653}    Social History     Tobacco Use     Smoking status: Never Smoker     Smokeless tobacco: Never Used   Substance Use Topics     Alcohol use: Yes     Comment: less than 1x a week     No flowsheet data found.{add AUDIT responses (Optional) (A score of 7 for adult men is an indication of hazardous drinking; a score of 8 or more is an indication of an alcohol use disorder.  A score of 7 or more for adult women is an indication of hazardous drinking or an alchohol use disorder):460798}    Reviewed orders with patient.  Reviewed health maintenance and updated orders accordingly - {Yes/No:036512::\"Yes\"}  {Chronicprobdata (Optional):844076}    {Mammo Decision Support (Optional):733434}    Pertinent mammograms are reviewed under the imaging tab.  History of abnormal Pap smear: {PAP HX:806441}  PAP / HPV 7/18/2018 9/1/2015   PAP NIL Negative     Reviewed and updated as needed this visit by clinical staff         Reviewed and updated as needed this visit by Provider        {HISTORY OPTIONS (Optional):047681}    Review of Systems  {FEMALE ROS (Optional):157580}     OBJECTIVE:   There were no vitals taken for this visit.  Physical Exam  {Exam Choices (Optional):239703}    {Diagnostic Test Results (Optional):342197::\"Diagnostic Test " "Results:\",\"none \"}    ASSESSMENT/PLAN:   {Diag Picklist:494329}    COUNSELING:  {FEMALE COUNSELING MESSAGES:215443::\"Reviewed preventive health counseling, as reflected in patient instructions\"}    BP Readings from Last 1 Encounters:   07/18/18 120/75     Estimated body mass index is 25.79 kg/m  as calculated from the following:    Height as of 7/18/18: 1.651 m (5' 5\").    Weight as of 7/18/18: 70.3 kg (155 lb).    {BP Counseling- Complete if BP >= 120/80  (Optional):172407}  {Weight Management Plan (ACO) Complete if BMI is abnormal-  Ages 18-64  BMI >24.9.  Age 65+ with BMI <23 or >30 (Optional):763653}     reports that  has never smoked. she has never used smokeless tobacco.  {Tobacco Cessation -- Complete if patient is a smoker (Optional):896118}    Counseling Resources:  ATP IV Guidelines  Pooled Cohorts Equation Calculator  Breast Cancer Risk Calculator  FRAX Risk Assessment  ICSI Preventive Guidelines  Dietary Guidelines for Americans, 2010  [employer]'s MyPlate  ASA Prophylaxis  Lung CA Screening    Terry Demarco MD, MD  [facility]  "

## 2018-12-10 NOTE — PROGRESS NOTES
SUBJECTIVE:   Mervin Pacheco is a 23 year old female who presents to clinic today for the following health issues:      Anxiety Follow-Up    Status since last visit: No change    Other associated symptoms:None    Complicating factors:   Significant life event: Yes-   recently, new job, and graduating grad school   Current substance abuse: None  Depression symptoms: No  NURY-7 SCORE 7/15/2018 7/18/2018 12/10/2018   Total Score 10 (moderate anxiety) - -   Total Score 10 7 7   Some encounter information is confidential and restricted. Go to Review Flowsheets activity to see all data.       NURY-7    Amount of exercise or physical activity: 2-3 days/week for an average of 30-45 minutes    Problems taking medications regularly: No    Medication side effects: none    Diet: regular (no restrictions)    - anxiety on and off. Gets a panic attack.   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   - not too often.   As needed episodes. Hard to tell.   Feb of last year symptoms started.  Undiagnosed family history.  No suicidal thoughts or family history of suicidal attempts.   Lorazepam - 1/2 with episode.   Got 20 pills around 3 months ago and 3 pills left.   Got  this summer too.   Panic attacks more work related and sometime finance related.     Lesion/mole     Onset: this summer    Description:   Location: 1 mole on right forearm   Character: raised, light brown, growing bigger , no drainage or pus         Color: brown        Border description: raised    History:    Has it spread/changed:  no  Any previous history of skin cancer: no  Any family history of melanoma: no    Accompanying Signs & Symptoms:   Fever: no  Bleeding: no  Scaling: no  Excessive sun exposure/tanning: no  Sunscreen used: YES  Contact with known allergens:  no    Therapies tried and outcome:  no with no relief      GERD/Heartburn      Duration: year     Description (location/character/radiation): mid chest feels like burning and  heartburn    Intensity:  mild    Accompanying signs and symptoms:  food getting stuck: no   nausea/vomiting/blood: no   abdominal pain: no   black/tarry or bloody stools: no :    History (similar episodes/previous evaluation): mother has Barettes esophagus and eosinophilice esophagitis and grandmother  of stomach cancer    Precipitating or alleviating factors:  worse with no particular food or drink.  current NSAID/Aspirin use: no     Therapies tried and outcome: TUMS and medication helpful    For years of symptoms - acid reflux during childhood. In adulthood, belching around 200 times per day. Some acid reflux symptoms.   - havent done anything except for TUMS.   - no specific diet issue. Low dairy but did not help.   Grandmother with stomach cancer.  Mother with eosinopenic esophagitis.   No major symptoms at night time. Usually after eating.   No black stool. Tylenol for pain if needed.   No constipation or diarrhea.          Problem list and histories reviewed & adjusted, as indicated.  Additional history: as documented    Labs reviewed in EPIC    Reviewed and updated as needed this visit by clinical staff  Tobacco  Allergies  Meds  Med Hx  Surg Hx  Fam Hx  Soc Hx      Reviewed and updated as needed this visit by Provider        Social History     Socioeconomic History     Marital status:      Spouse name: Not on file     Number of children: Not on file     Years of education: Not on file     Highest education level: Not on file   Social Needs     Financial resource strain: Not on file     Food insecurity - worry: Not on file     Food insecurity - inability: Not on file     Transportation needs - medical: Not on file     Transportation needs - non-medical: Not on file   Occupational History     Not on file   Tobacco Use     Smoking status: Never Smoker     Smokeless tobacco: Never Used   Substance and Sexual Activity     Alcohol use: Yes     Comment: less than 1x a week     Drug use: No      "Sexual activity: Yes     Partners: Male     Birth control/protection: Inserts/Ring   Other Topics Concern     Parent/sibling w/ CABG, MI or angioplasty before 65F 55M? Not Asked   Social History Narrative     Not on file     No Known Allergies  Patient Active Problem List   Diagnosis     Genito-pelvic pain/penetration disorder     Vulvodynia     Reviewed medications, social history and  past medical and surgical history.    Review of system: for general, respiratory, CVS, GI and psychiatry negative except for noted above.     EXAM:  /64 (BP Location: Right arm, Patient Position: Sitting, Cuff Size: Adult Large)   Pulse 80   Temp 98.9  F (37.2  C) (Oral)   Resp 16   Ht 1.638 m (5' 4.5\")   Wt 77 kg (169 lb 12 oz)   SpO2 98%   BMI 28.69 kg/m    Constitutional: healthy, alert and no distress   Psychiatric: mentation appears normal and affect normal/bright  Cardiovascular: RRR. No murmurs,  Respiratory: negative, Lungs clear. No crackles or wheezing. No tachypnea.   Skin right forearm around 0.3 cm x 0.2 cm slighlty raised pale brownish lesion     ASSESSMENT / PLAN:  (F41.9) Anxiety  (primary encounter diagnosis)  Comment: We discussed benzodiazepines may not be the most ideal drug.  She understands.  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  We discussed about using Inderal and hydroxyzine and how to use and side effects of each.  She is not ready for SSRI medications and I think it may be reasonable for now.  We discussed about considering SSRI medication if she continue to use lorazepam as I do not recommend lorazepam as a monotherapy.  Plan: LORazepam (ATIVAN) 0.5 MG tablet, propranolol         (INDERAL) 20 MG tablet, hydrOXYzine (ATARAX) 25        MG tablet            (R23.9) Skin change  Comment:   Plan: Seborrheic keratosis vs small intradermal nevus. Reassured.     (Z23) Need for prophylactic vaccination and inoculation against influenza  Comment:    Plan: FLU VACCINE, " SPLIT VIRUS, IM (QUADRIVALENT)         [48537]- >3 YRS, Vaccine Administration,         Initial [85095]             (K21.9) Gastroesophageal reflux disease, esophagitis presence not specified  Comment:  With family history of eosinophilic esophagitis. Some concern of genetic component from maternal history. We trial of PPI for 2 weeks, work on weight, lifestyle and diet and see GI if not improving.   Plan: GASTROENTEROLOGY ADULT REF CONSULT ONLY           Follow up: with PCP pending above evaluation.     The above note was dictated using voice recognition. Although reviewed after completion, some word and grammatical error may remain .      Patient Instructions   Omeprazole (prilosec) 20mg every morning.   See gastroenterologist for further evaluation.             Injectable Influenza Immunization Documentation    1.  Is the person to be vaccinated sick today?   No    2. Does the person to be vaccinated have an allergy to a component   of the vaccine?   No  Egg Allergy Algorithm Link    3. Has the person to be vaccinated ever had a serious reaction   to influenza vaccine in the past?   No    4. Has the person to be vaccinated ever had Guillain-Barré syndrome?   No    Form completed by Aruna Olivier [facility]

## 2018-12-11 ASSESSMENT — ANXIETY QUESTIONNAIRES: GAD7 TOTAL SCORE: 7

## 2019-02-14 ENCOUNTER — TRANSFERRED RECORDS (OUTPATIENT)
Dept: HEALTH INFORMATION MANAGEMENT | Facility: CLINIC | Age: 24
End: 2019-02-14

## 2019-03-01 ENCOUNTER — TRANSFERRED RECORDS (OUTPATIENT)
Dept: HEALTH INFORMATION MANAGEMENT | Facility: CLINIC | Age: 24
End: 2019-03-01

## 2019-03-18 ENCOUNTER — TRANSFERRED RECORDS (OUTPATIENT)
Dept: HEALTH INFORMATION MANAGEMENT | Facility: CLINIC | Age: 24
End: 2019-03-18

## 2019-04-05 ENCOUNTER — HOSPITAL ENCOUNTER (OUTPATIENT)
Dept: NUCLEAR MEDICINE | Facility: CLINIC | Age: 24
Setting detail: NUCLEAR MEDICINE
Discharge: HOME | End: 2019-04-05
Attending: INTERNAL MEDICINE | Admitting: INTERNAL MEDICINE
Payer: COMMERCIAL

## 2019-04-05 DIAGNOSIS — R03.0 ELEVATED BLOOD-PRESSURE READING WITHOUT DIAGNOSIS OF HYPERTENSION: ICD-10-CM

## 2019-04-05 DIAGNOSIS — Z71.3 DIETARY COUNSELING AND SURVEILLANCE: ICD-10-CM

## 2019-04-05 DIAGNOSIS — K21.00 GASTRO-ESOPHAGEAL REFLUX DISEASE WITH ESOPHAGITIS: ICD-10-CM

## 2019-04-05 DIAGNOSIS — R10.9 ABDOMINAL PAIN, UNSPECIFIED ABDOMINAL LOCATION: ICD-10-CM

## 2019-04-05 PROCEDURE — 78264 GASTRIC EMPTYING IMG STUDY: CPT

## 2019-04-05 PROCEDURE — 34300033 ZZH RX 343: Performed by: INTERNAL MEDICINE

## 2019-04-05 PROCEDURE — A9541 TC99M SULFUR COLLOID: HCPCS | Performed by: INTERNAL MEDICINE

## 2019-04-05 RX ADMIN — Medication 2 MCI.: at 08:45

## 2019-04-08 ENCOUNTER — TRANSFERRED RECORDS (OUTPATIENT)
Dept: HEALTH INFORMATION MANAGEMENT | Facility: CLINIC | Age: 24
End: 2019-04-08

## 2019-04-19 ENCOUNTER — TRANSFERRED RECORDS (OUTPATIENT)
Dept: HEALTH INFORMATION MANAGEMENT | Facility: CLINIC | Age: 24
End: 2019-04-19

## 2019-04-25 ENCOUNTER — E-VISIT (OUTPATIENT)
Dept: FAMILY MEDICINE | Facility: CLINIC | Age: 24
End: 2019-04-25
Payer: COMMERCIAL

## 2019-04-25 DIAGNOSIS — A08.4 VIRAL GASTROENTERITIS: Primary | ICD-10-CM

## 2019-04-25 PROCEDURE — 99284 EMERGENCY DEPT VISIT MOD MDM: CPT | Mod: 25 | Performed by: EMERGENCY MEDICINE

## 2019-04-25 PROCEDURE — 99444 ZZC PHYSICIAN ONLINE EVALUATION & MANAGEMENT SERVICE: CPT | Performed by: FAMILY MEDICINE

## 2019-04-25 PROCEDURE — 25000131 ZZH RX MED GY IP 250 OP 636 PS 637: Performed by: EMERGENCY MEDICINE

## 2019-04-25 PROCEDURE — 99284 EMERGENCY DEPT VISIT MOD MDM: CPT | Mod: Z6 | Performed by: EMERGENCY MEDICINE

## 2019-04-25 RX ORDER — ONDANSETRON 4 MG/1
8 TABLET, ORALLY DISINTEGRATING ORAL EVERY 8 HOURS PRN
COMMUNITY
End: 2021-01-11

## 2019-04-25 RX ORDER — ONDANSETRON 4 MG/1
4 TABLET, ORALLY DISINTEGRATING ORAL ONCE
Status: COMPLETED | OUTPATIENT
Start: 2019-04-25 | End: 2019-04-25

## 2019-04-25 RX ADMIN — ONDANSETRON 4 MG: 4 TABLET, ORALLY DISINTEGRATING ORAL at 23:10

## 2019-04-26 ENCOUNTER — HOSPITAL ENCOUNTER (EMERGENCY)
Facility: CLINIC | Age: 24
Discharge: HOME | End: 2019-04-26
Attending: EMERGENCY MEDICINE | Admitting: EMERGENCY MEDICINE
Payer: COMMERCIAL

## 2019-04-26 VITALS
TEMPERATURE: 98.5 F | DIASTOLIC BLOOD PRESSURE: 69 MMHG | SYSTOLIC BLOOD PRESSURE: 125 MMHG | HEIGHT: 65 IN | RESPIRATION RATE: 16 BRPM | HEART RATE: 85 BPM | OXYGEN SATURATION: 100 % | BODY MASS INDEX: 28.25 KG/M2

## 2019-04-26 DIAGNOSIS — R11.10 VOMITING AND DIARRHEA: ICD-10-CM

## 2019-04-26 DIAGNOSIS — R10.9 ABDOMINAL PAIN, UNSPECIFIED ABDOMINAL LOCATION: ICD-10-CM

## 2019-04-26 DIAGNOSIS — R19.7 VOMITING AND DIARRHEA: ICD-10-CM

## 2019-04-26 LAB
ALBUMIN SERPL-MCNC: 3.2 G/DL (ref 3.4–5)
ALBUMIN UR-MCNC: NEGATIVE MG/DL
ALP SERPL-CCNC: 70 U/L (ref 40–150)
ALT SERPL W P-5'-P-CCNC: 32 U/L (ref 0–50)
ANION GAP SERPL CALCULATED.3IONS-SCNC: 6 MMOL/L (ref 3–14)
APPEARANCE UR: CLEAR
AST SERPL W P-5'-P-CCNC: 19 U/L (ref 0–45)
BACTERIA #/AREA URNS HPF: ABNORMAL /HPF
BASOPHILS # BLD AUTO: 0 10E9/L (ref 0–0.2)
BASOPHILS NFR BLD AUTO: 0.2 %
BILIRUB SERPL-MCNC: 0.5 MG/DL (ref 0.2–1.3)
BILIRUB UR QL STRIP: NEGATIVE
BUN SERPL-MCNC: 9 MG/DL (ref 7–30)
CALCIUM SERPL-MCNC: 8.3 MG/DL (ref 8.5–10.1)
CHLORIDE SERPL-SCNC: 107 MMOL/L (ref 94–109)
CO2 SERPL-SCNC: 22 MMOL/L (ref 20–32)
COLOR UR AUTO: YELLOW
CREAT SERPL-MCNC: 0.82 MG/DL (ref 0.52–1.04)
DIFFERENTIAL METHOD BLD: ABNORMAL
EOSINOPHIL # BLD AUTO: 0.1 10E9/L (ref 0–0.7)
EOSINOPHIL NFR BLD AUTO: 0.6 %
ERYTHROCYTE [DISTWIDTH] IN BLOOD BY AUTOMATED COUNT: 12.8 % (ref 10–15)
GFR SERPL CREATININE-BSD FRML MDRD: >90 ML/MIN/{1.73_M2}
GLUCOSE SERPL-MCNC: 106 MG/DL (ref 70–99)
GLUCOSE UR STRIP-MCNC: NEGATIVE MG/DL
HCG SERPL QL: NEGATIVE
HCG UR QL: NEGATIVE
HCT VFR BLD AUTO: 46.9 % (ref 35–47)
HGB BLD-MCNC: 15.5 G/DL (ref 11.7–15.7)
HGB UR QL STRIP: NEGATIVE
IMM GRANULOCYTES # BLD: 0 10E9/L (ref 0–0.4)
IMM GRANULOCYTES NFR BLD: 0.2 %
INTERNAL QC OK POCT: YES
KETONES UR STRIP-MCNC: 80 MG/DL
LEUKOCYTE ESTERASE UR QL STRIP: ABNORMAL
LYMPHOCYTES # BLD AUTO: 0.7 10E9/L (ref 0.8–5.3)
LYMPHOCYTES NFR BLD AUTO: 5.6 %
MCH RBC QN AUTO: 28.5 PG (ref 26.5–33)
MCHC RBC AUTO-ENTMCNC: 33 G/DL (ref 31.5–36.5)
MCV RBC AUTO: 86 FL (ref 78–100)
MONOCYTES # BLD AUTO: 0.6 10E9/L (ref 0–1.3)
MONOCYTES NFR BLD AUTO: 5.1 %
MUCOUS THREADS #/AREA URNS LPF: PRESENT /LPF
NEUTROPHILS # BLD AUTO: 10.7 10E9/L (ref 1.6–8.3)
NEUTROPHILS NFR BLD AUTO: 88.3 %
NITRATE UR QL: NEGATIVE
NRBC # BLD AUTO: 0 10*3/UL
NRBC BLD AUTO-RTO: 0 /100
PH UR STRIP: 5.5 PH (ref 5–7)
PLATELET # BLD AUTO: 293 10E9/L (ref 150–450)
POTASSIUM SERPL-SCNC: 3.3 MMOL/L (ref 3.4–5.3)
PROT SERPL-MCNC: 7.4 G/DL (ref 6.8–8.8)
RBC # BLD AUTO: 5.44 10E12/L (ref 3.8–5.2)
RBC #/AREA URNS AUTO: 1 /HPF (ref 0–2)
SODIUM SERPL-SCNC: 136 MMOL/L (ref 133–144)
SOURCE: ABNORMAL
SP GR UR STRIP: 1.02 (ref 1–1.03)
SQUAMOUS #/AREA URNS AUTO: 1 /HPF (ref 0–1)
UROBILINOGEN UR STRIP-MCNC: NORMAL MG/DL (ref 0–2)
WBC # BLD AUTO: 12.2 10E9/L (ref 4–11)
WBC #/AREA URNS AUTO: 2 /HPF (ref 0–5)

## 2019-04-26 PROCEDURE — 96372 THER/PROPH/DIAG INJ SC/IM: CPT | Performed by: EMERGENCY MEDICINE

## 2019-04-26 PROCEDURE — 96361 HYDRATE IV INFUSION ADD-ON: CPT | Performed by: EMERGENCY MEDICINE

## 2019-04-26 PROCEDURE — 25000128 H RX IP 250 OP 636: Performed by: EMERGENCY MEDICINE

## 2019-04-26 PROCEDURE — 84703 CHORIONIC GONADOTROPIN ASSAY: CPT | Performed by: EMERGENCY MEDICINE

## 2019-04-26 PROCEDURE — 80053 COMPREHEN METABOLIC PANEL: CPT | Performed by: EMERGENCY MEDICINE

## 2019-04-26 PROCEDURE — 81001 URINALYSIS AUTO W/SCOPE: CPT | Performed by: EMERGENCY MEDICINE

## 2019-04-26 PROCEDURE — 96360 HYDRATION IV INFUSION INIT: CPT | Performed by: EMERGENCY MEDICINE

## 2019-04-26 PROCEDURE — 85025 COMPLETE CBC W/AUTO DIFF WBC: CPT | Performed by: EMERGENCY MEDICINE

## 2019-04-26 PROCEDURE — 81025 URINE PREGNANCY TEST: CPT | Performed by: EMERGENCY MEDICINE

## 2019-04-26 RX ORDER — SODIUM CHLORIDE 9 MG/ML
INJECTION, SOLUTION INTRAVENOUS CONTINUOUS
Status: DISCONTINUED | OUTPATIENT
Start: 2019-04-26 | End: 2019-04-26 | Stop reason: HOSPADM

## 2019-04-26 RX ORDER — METOCLOPRAMIDE 10 MG/1
10 TABLET ORAL 3 TIMES DAILY PRN
Qty: 30 TABLET | Refills: 0 | Status: SHIPPED | OUTPATIENT
Start: 2019-04-26 | End: 2019-10-01

## 2019-04-26 RX ORDER — OLANZAPINE 10 MG/2ML
2.5 INJECTION, POWDER, FOR SOLUTION INTRAMUSCULAR ONCE
Status: COMPLETED | OUTPATIENT
Start: 2019-04-26 | End: 2019-04-26

## 2019-04-26 RX ADMIN — OLANZAPINE 2.5 MG: 10 INJECTION, POWDER, FOR SOLUTION INTRAMUSCULAR at 01:35

## 2019-04-26 RX ADMIN — SODIUM CHLORIDE 1000 ML: 9 INJECTION, SOLUTION INTRAVENOUS at 01:34

## 2019-04-26 RX ADMIN — SODIUM CHLORIDE 1000 ML: 9 INJECTION, SOLUTION INTRAVENOUS at 02:28

## 2019-04-26 NOTE — ED PROVIDER NOTES
"  History     Chief Complaint   Patient presents with     Abdominal Pain     Nausea, Vomiting, & Diarrhea     The history is provided by the patient and medical records.     Mervin Pacheco is a 24 year old female with a history of genito-pelvic pain/penetration disorder, vulvodynia, GERD, and gastroparesis, who presents to the emergency Department for evaluation of abdominal pain, nausea, emesis, and diarrhea. She describes her pain as stabbing.    Was completely well last pm, this AM at 7am felt nauseated with diffuse abdominal pain, intermittent. Multiple episodes of vomiting and dry heaving. Many watery, non bloody stools (> 5 episodes).      Fever 100.8 at home.   Works in school  No recent travel or antibiotics.              She endorses taking Zofran and Tylenol for symptomatic management, without much improvement at home for this episode.    I have reviewed the Medications, Allergies, Past Medical and Surgical History, and Social History in the [employer] system.    Past Medical History:   Diagnosis Date     Gastroparesis      GERD (gastroesophageal reflux disease)     esophageal polyp       Past Surgical History:   Procedure Laterality Date     APPENDECTOMY      4/2002     ENT SURGERY  2011    tonsils       Family History   Problem Relation Age of Onset     Hypertension Father      Hyperlipidemia Father      Diabetes Father      Stomach Cancer Maternal Grandmother      Breast Cancer Maternal Aunt        Social History     Tobacco Use     Smoking status: Never Smoker     Smokeless tobacco: Never Used   Substance Use Topics     Alcohol use: Yes     Comment: rarely     Review of Systems  14 point review of symptoms was performed and is negative except as noted above.     Physical Exam   BP: 131/64  Heart Rate: 117  Temp: 98.3  F (36.8  C)  Resp: 18  Height: 165.1 cm (5' 5\")  SpO2: 96 %      Physical Exam  GEN: Nauseated but cooperative and conversant alert HEENT: The head is normocephalic and atraumatic. Pupils are " equal round and reactive to light. Extraocular motions are intact. There is no facial swelling. The neck is nontender and supple.   CV: Regular rate and rhythm without murmurs rubs or gallops. 2+ radial pulses bilaterally.  PULM: Clear to auscultation bilaterally.  ABD: Soft, epigastric discomfort to palpation, nondistended.   EXT: Full range of motion.  No edema.  NEURO: Cranial nerves II through XII are intact and symmetric. Bilateral upper and lower extremities grossly show full range of motion without any focal deficits.   SKIN: No rashes, ecchymosis, or lacerations  PSYCH: Calm and cooperative, interactive.     ED Course        Procedures         Labs Ordered and Resulted from Time of ED Arrival Up to the Time of Departure from the ED   COMPREHENSIVE METABOLIC PANEL - Abnormal; Notable for the following components:       Result Value    Potassium 3.3 (*)     Glucose 106 (*)     Calcium 8.3 (*)     Albumin 3.2 (*)     All other components within normal limits   CBC WITH PLATELETS DIFFERENTIAL - Abnormal; Notable for the following components:    WBC 12.2 (*)     RBC Count 5.44 (*)     Absolute Neutrophil 10.7 (*)     Absolute Lymphocytes 0.7 (*)     All other components within normal limits   ROUTINE UA WITH MICROSCOPIC REFLEX TO CULTURE - Abnormal; Notable for the following components:    Ketones Urine 80 (*)     Leukocyte Esterase Urine Trace (*)     Bacteria Urine Few (*)     Mucous Urine Present (*)     All other components within normal limits   HCG QUAL URINE POCT - Normal   HCG QUALITATIVE            Assessments & Plan (with Medical Decision Making)   24-year-old female with recently diagnosed gastroparesis followed by GI presenting with vomiting diarrhea abdominal discomfort as described  Differential diagnosis is broad including viral or bacterial enteritis, bowel obstruction, gastroparesis, intra-abdominal infection including colitis or abscess formation, UTI, among other causes    Patient received IV  hydration and antiemetics.  She subsequently felt greatly improved and nausea and vomiting subsided.  She was able to tolerate p.o. without difficulty    Labs have been unrevealing except for very mild hypokalemia  hCG negative  Urinalysis without meghna evidence of infection      Given her low-grade fever with vomiting and multiple soft of diarrhea viral gastroenteritis is possible.  Given her clinical course my suspicion for an acute emergent process is less likely.  She is appropriate for outpt f/u with GI    Will prescribe metoclopramide due to history of gastroparesis as this may assist gastric motility along with nausea control.    - Patient agrees to our plan and is ready and eager for discharge. Care plan, follow up plan, and reasons to return immediately to the ED were dicussed in detail and summarized as noted in the discharge instructions.    I have reviewed the nursing notes.    I have reviewed the findings, diagnosis, plan and need for follow up with the patient.       Medication List      Started    metoclopramide 10 MG tablet  Commonly known as:  REGLAN  10 mg, Oral, 3 TIMES DAILY PRN            Final diagnoses:   Vomiting and diarrhea   Abdominal pain, unspecified abdominal location   IÁngel, am serving as a trained medical scribe to document services personally performed by Sal Solano MD, based on the provider's statements to me.      Sal WEAVER MD, was physically present and have reviewed and verified the accuracy of this note documented by Ángel Madrigal.     4/25/2019   [facility], [place], EMERGENCY DEPARTMENT     Sal Solano MD  04/26/19 0412

## 2019-04-26 NOTE — DISCHARGE INSTRUCTIONS
Please be sure to follow-up with your gastroenterologist as scheduled.  If your symptoms persist, be sure to call them and discuss advancing your appointment.    Take Tylenol as needed for pain, metoclopramide as needed for nausea    Return to the emergency department for any worsening back pain, abdominal pain, fevers or chills, burning with urination, nausea or vomiting, weakness or any other concerns as given or discussed.

## 2019-04-26 NOTE — ED AVS SNAPSHOT
[facility] [place], Emergency Department  [address] [place] [id number]  Phone:  [phone]                                    Mervin Pacheco   MRN: [id number]    Department:  [facility], Emergency Department   Date of Visit:  4/25/2019           After Visit Summary Signature Page    I have received my discharge instructions, and my questions have been answered. I have discussed any challenges I see with this plan with the nurse or doctor.    ..........................................................................................................................................  Patient/Patient Representative Signature      ..........................................................................................................................................  Patient Representative Print Name and Relationship to Patient    ..................................................               ................................................  Date                                   Time    ..........................................................................................................................................  Reviewed by Signature/Title    ...................................................              ..............................................  Date                                               Time          22EPIC Rev 08/18

## 2019-05-28 ENCOUNTER — OFFICE VISIT (OUTPATIENT)
Dept: ALLERGY | Facility: CLINIC | Age: 24
End: 2019-05-28
Payer: COMMERCIAL

## 2019-05-28 ENCOUNTER — TRANSFERRED RECORDS (OUTPATIENT)
Dept: HEALTH INFORMATION MANAGEMENT | Facility: CLINIC | Age: 24
End: 2019-05-28

## 2019-05-28 VITALS
DIASTOLIC BLOOD PRESSURE: 77 MMHG | WEIGHT: 179.6 LBS | HEART RATE: 72 BPM | OXYGEN SATURATION: 98 % | BODY MASS INDEX: 29.89 KG/M2 | SYSTOLIC BLOOD PRESSURE: 123 MMHG

## 2019-05-28 DIAGNOSIS — K21.9 GASTROESOPHAGEAL REFLUX DISEASE, ESOPHAGITIS PRESENCE NOT SPECIFIED: ICD-10-CM

## 2019-05-28 DIAGNOSIS — K31.84 GASTROPARESIS: ICD-10-CM

## 2019-05-28 DIAGNOSIS — E73.9 LACTOSE INTOLERANCE: Primary | ICD-10-CM

## 2019-05-28 PROCEDURE — 99203 OFFICE O/P NEW LOW 30 MIN: CPT | Performed by: ALLERGY & IMMUNOLOGY

## 2019-05-28 NOTE — PROGRESS NOTES
Mervin Pacheco was seen in the Allergy Clinic at [facility]. The following are my recommendations regarding her Lactose Intolerance, GERD and Gastroparesis    1. No evidence of IgE mediated food allergies and further evaluation was not recommended at this time.  2. Recommend avoidance of cow's milk or substitution with lactose-free dairy products.  3. Recommend avoidance of lettuce and pineapple due to symptoms of discomfort  4. Follow-up with nutritionist as scheduled. Advised keeping a detailed food and symptom diary prior to this appointment.  5. Follow-up in the allergy clinic as needed      Mervin Pacheco is a 24 year old White female being seen today in consultation for food allergies. She states that she has recently been diagnosed with GERD, gastroparesis, and dumping syndrome. She is concerned that certain foods may be contributing to these symptoms and requests testing for food allergies. Her concerns include dairy, lettuce, and pineapple. Mervin reports that after eating a large amount of dairy products she develops gas and has to go to the bathroom within 30 minutes. This occurs with unpasteurized products as well as milk. She is able to sometimes tolerate lactose-free milk and hard cheeses. She has not had yogurt recently. She is not entirely avoiding dairy products but has reduced her consumption. Mervin reports she notes the symptoms are dose dependent and larger amounts cause more symptoms. She also expressed concerns with lettuce. When she eats lettuce she notes increased burping and feels that it is stuck in her throat for several hours. This has occurred with joey and butter lettuce but not other leafy greens. When she burps it also tastes like lettuce even if she has eaten other foods. When she eats pineapple her lips and throat begin to feel tingly and numb. This occurs immediately and she stops eating the pineapple. It will take over an hour for these symptoms to resolve. These  symptoms are not associated with any other reactions including hives, rash, difficulty swallowing, difficulty breathing, cough, nausea, or vomiting.    Mervin also reports that randomly she will develop rashes on her neck and face. She also develops redness of her face or ears and will feel flushed. She has not pinpointed this to any specific food or other environmental exposure.    She eats and tolerates wheat, cow's milk, eggs, peanut, tree nuts, fish, and shellfish. She has not identified any foods that have resulted in symptoms consistent with an IgE mediated reaction.      Past Medical History:   Diagnosis Date     Gastroparesis      GERD (gastroesophageal reflux disease)     esophageal polyp     Family History   Problem Relation Age of Onset     Hypertension Father      Hyperlipidemia Father      Diabetes Father      Stomach Cancer Maternal Grandmother      Breast Cancer Maternal Aunt      Past Surgical History:   Procedure Laterality Date     APPENDECTOMY      4/2002     ENT SURGERY  2011    tonsils       ENVIRONMENTAL HISTORY: [place]. The home is heated with a baseboard heat. They do not have central air conditioning. The patient's bedroom is furnished with hard el in bedroom and fabric window coverings.  Pets inside the house include 1 cat(s). There is no history of cockroach or mice infestation. There is/are 0 smokers in the house.  The house does not have a basement.     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 She has missed 7 days of school/work due to GI sx (nasuea, vomiting). She lives with her spouse.  Her spouse works as an RN.    REVIEW OF SYSTEMS:  General: negative for weight gain. negative for weight loss. negative for changes in sleep.   Eyes: positive  for itching. positive  for redness. negative for tearing/watering. negative for vision changes  Ears: negative for fullness. negative for hearing loss. negative for dizziness. Positive  for itching.  Nose: negative for snoring.negative for changes in smell. negative for drainage. Positive for congestion.  Throat: negative for hoarseness. negative for sore throat. negative for trouble swallowing.   Lungs: negative for cough. negative for shortness of breath.negative for wheezing. negative for sputum production.   Cardiovascular: negative for chest pain. negative for swelling of ankles. negative for fast or irregular heartbeat.   Gastrointestinal: positive  for nausea. positive  for heartburn. positive  for acid reflux.   Musculoskeletal: negative for joint pain. negative for joint stiffness. negative for joint swelling.   Neurologic: negative for seizures. negative for fainting. negative for weakness.   Psychiatric: negative for changes in mood. negative for anxiety.   Endocrine: negative for cold intolerance. negative for heat intolerance. negative for tremors.   Hematologic: negative for easy bruising. negative for easy bleeding.  Integumentary: negative for rash. negative for scaling. negative for nail changes.       Current Outpatient Medications:      etonogestrel-ethinyl estradiol (NUVARING) 0.12-0.015 MG/24HR vaginal ring, Insert 1 ring vaginally every 21 days then remove for 1 week then repeat with new ring., Disp: 3 each, Rfl: 3     omeprazole (PRILOSEC) 20 MG DR capsule, , Disp: , Rfl:      hydrOXYzine (ATARAX) 25 MG tablet, Take 1 tablet (25 mg) by mouth every 6 hours as needed for anxiety (Patient not taking: Reported on 5/28/2019), Disp: 60 tablet, Rfl: 0     LORazepam (ATIVAN) 0.5 MG tablet, Take 1 tablet (0.5 mg) by mouth every 8 hours as needed for anxiety (Patient not taking: Reported on 5/28/2019), Disp: 20 tablet, Rfl: 0     metoclopramide (REGLAN) 10 MG tablet, Take 1 tablet (10 mg) by mouth 3 times daily as needed (nausea) (Patient not taking: Reported on 5/28/2019), Disp: 30 tablet, Rfl: 0     ondansetron (ZOFRAN-ODT) 4 MG ODT tab, Take 8 mg by mouth every 8 hours as needed for  nausea, Disp: , Rfl:      propranolol (INDERAL) 20 MG tablet, Take 0.5-1 tablets (10-20 mg) by mouth 3 times daily as needed (for anxiety) (Patient not taking: Reported on 5/28/2019), Disp: 60 tablet, Rfl: 0  Immunization History   Administered Date(s) Administered     HPV 01/11/2017, 05/03/2017     HPV9 09/13/2017     Influenza Vaccine IM 3yrs+ 4 Valent IIV4 10/08/2016, 09/13/2017, 12/10/2018     TDAP Vaccine (Adacel) 01/01/2012     Tetanus 01/01/2012     No Known Allergies      EXAM:   /77 (BP Location: Left arm, Patient Position: Sitting, Cuff Size: Adult Large)   Pulse 72   Wt 81.5 kg (179 lb 9.6 oz)   SpO2 98%   BMI 29.89 kg/m    GENERAL APPEARANCE: alert, cooperative and not in distress  SKIN: no rashes, no lesions  HEAD: atraumatic, normocephalic  EYES: lids and lashes normal, conjunctivae and sclerae clear, pupils equal, round, reactive to light, EOM full and intact  ENT: no scars or lesions, nasal exam showed no discharge, swelling or lesions noted, otoscopy showed external auditory canals clear, tympanic membranes normal, tongue midline and normal, soft palate, uvula, and tonsils normal  NECK: no asymmetry, masses, or scars, supple without significant adenopathy  LUNGS: unlabored respirations, no intercostal retractions or accessory muscle use, clear to auscultation without rales or wheezes  HEART: regular rate and rhythm without murmurs and normal S1 and S2  MUSCULOSKELETAL: no musculoskeletal defects are noted  NEURO: no focal deficits noted  PSYCH: does not appear depressed or anxious    WORKUP: None    ASSESSMENT/PLAN:  Mervin Pacheco is a 24 year old female here for evaluation of food allergies. She has been followed by gastroenterology and recently diagnosed with GERD and gastroparesis. She is concerned that she may have allergies to cow's milk, lettuce, and pineapple. The history provided today is not consistent with IgE mediated allergic reactions to these foods. The symptoms reported to  dairy products are consistent with lactose intolerance. While she has experienced symptoms to lettuce and pineapple these symptoms are not concerning for anaphylaxis and the numbness and tingling experienced from the pineapple may be due to enzymes, specifically bromelain, found in pineapple. Mervin's symptoms of random rashes and flushing have not been attributable to any specific food or environmental trigger and are also not consistent with an IgE mediated food allergy. Based on the history skin and specific IgE testing for food allergies is not indicated or recommended. She does have symptoms of food intolerances, specifically lactose intolerance, and may have other sensitivities as well. Unfortunately there is no standardized testing for most intolerances or sensitivities and we discussed management via keeping a food diary and targeted elimination diets.    1. No evidence of IgE mediated food allergies and further evaluation was not recommended at this time.  2. Recommend avoidance of cow's milk or substitution with lactose-free dairy products.  3. Recommend avoidance of lettuce and pineapple due to symptoms of discomfort  4. Follow-up with nutritionist as scheduled. Advised keeping a detailed food and symptom diary prior to this appointment.  5. Follow-up in the allergy clinic as needed      Levi Borrego MD  Allergy/Immunology  [facility] and [place], [place]      Chart documentation done in part with Dragon Voice Recognition Software. Although reviewed after completion, some word and grammatical errors may remain.

## 2019-05-28 NOTE — LETTER
5/28/2019         RE: Mervin Pacheco  [address]  [place] [address]        Dear Colleague,    Thank you for referring your patient, Mervin Pacheco, to the [facility]. Please see a copy of my visit note below.    Mervin Pacheco was seen in the Allergy Clinic at [facility]. The following are my recommendations regarding her Lactose Intolerance, GERD and Gastroparesis    1. No evidence of IgE mediated food allergies and further evaluation was not recommended at this time.  2. Recommend avoidance of cow's milk or substitution with lactose-free dairy products.  3. Recommend avoidance of lettuce and pineapple due to symptoms of discomfort  4. Follow-up with nutritionist as scheduled. Advised keeping a detailed food and symptom diary prior to this appointment.  5. Follow-up in the allergy clinic as needed      Mervin Pacheco is a 24 year old White female being seen today in consultation for food allergies. She states that she has recently been diagnosed with GERD, gastroparesis, and dumping syndrome. She is concerned that certain foods may be contributing to these symptoms and requests testing for food allergies. Her concerns include dairy, lettuce, and pineapple. Mervin reports that after eating a large amount of dairy products she develops gas and has to go to the bathroom within 30 minutes. This occurs with unpasteurized products as well as milk. She is able to sometimes tolerate lactose-free milk and hard cheeses. She has not had yogurt recently. She is not entirely avoiding dairy products but has reduced her consumption. Mervin reports she notes the symptoms are dose dependent and larger amounts cause more symptoms. She also expressed concerns with lettuce. When she eats lettuce she notes increased burping and feels that it is stuck in her throat for several hours. This has occurred with joey and butter lettuce but not other leafy greens. When she burps it  also tastes like lettuce even if she has eaten other foods. When she eats pineapple her lips and throat begin to feel tingly and numb. This occurs immediately and she stops eating the pineapple. It will take over an hour for these symptoms to resolve. These symptoms are not associated with any other reactions including hives, rash, difficulty swallowing, difficulty breathing, cough, nausea, or vomiting.    Mervin also reports that randomly she will develop rashes on her neck and face. She also develops redness of her face or ears and will feel flushed. She has not pinpointed this to any specific food or other environmental exposure.    She eats and tolerates wheat, cow's milk, eggs, peanut, tree nuts, fish, and shellfish. She has not identified any foods that have resulted in symptoms consistent with an IgE mediated reaction.      Past Medical History:   Diagnosis Date     Gastroparesis      GERD (gastroesophageal reflux disease)     esophageal polyp     Family History   Problem Relation Age of Onset     Hypertension Father      Hyperlipidemia Father      Diabetes Father      Stomach Cancer Maternal Grandmother      Breast Cancer Maternal Aunt      Past Surgical History:   Procedure Laterality Date     APPENDECTOMY      4/2002     ENT SURGERY  2011    tonsils       ENVIRONMENTAL HISTORY: [place] The home is heated with a baseboard heat. They do not have central air conditioning. The patient's bedroom is furnished with hard el in bedroom and fabric window coverings.  Pets inside the house include 1 cat(s). There is no history of cockroach or mice infestation. There is/are 0 smokers in the house.  The house does not have a basement.     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 She has missed 7 days of school/work due to GI sx (nasuea, vomiting). She lives with her spouse.  Her spouse works as an RN.    REVIEW OF SYSTEMS:  General: negative for weight gain.  negative for weight loss. negative for changes in sleep.   Eyes: positive  for itching. positive  for redness. negative for tearing/watering. negative for vision changes  Ears: negative for fullness. negative for hearing loss. negative for dizziness. Positive for itching.  Nose: negative for snoring.negative for changes in smell. negative for drainage. Positive for congestion.  Throat: negative for hoarseness. negative for sore throat. negative for trouble swallowing.   Lungs: negative for cough. negative for shortness of breath.negative for wheezing. negative for sputum production.   Cardiovascular: negative for chest pain. negative for swelling of ankles. negative for fast or irregular heartbeat.   Gastrointestinal: positive  for nausea. positive  for heartburn. positive  for acid reflux.   Musculoskeletal: negative for joint pain. negative for joint stiffness. negative for joint swelling.   Neurologic: negative for seizures. negative for fainting. negative for weakness.   Psychiatric: negative for changes in mood. negative for anxiety.   Endocrine: negative for cold intolerance. negative for heat intolerance. negative for tremors.   Hematologic: negative for easy bruising. negative for easy bleeding.  Integumentary: negative for rash. negative for scaling. negative for nail changes.       Current Outpatient Medications:      etonogestrel-ethinyl estradiol (NUVARING) 0.12-0.015 MG/24HR vaginal ring, Insert 1 ring vaginally every 21 days then remove for 1 week then repeat with new ring., Disp: 3 each, Rfl: 3     omeprazole (PRILOSEC) 20 MG DR capsule, , Disp: , Rfl:      hydrOXYzine (ATARAX) 25 MG tablet, Take 1 tablet (25 mg) by mouth every 6 hours as needed for anxiety (Patient not taking: Reported on 5/28/2019), Disp: 60 tablet, Rfl: 0     LORazepam (ATIVAN) 0.5 MG tablet, Take 1 tablet (0.5 mg) by mouth every 8 hours as needed for anxiety (Patient not taking: Reported on 5/28/2019), Disp: 20 tablet, Rfl: 0      metoclopramide (REGLAN) 10 MG tablet, Take 1 tablet (10 mg) by mouth 3 times daily as needed (nausea) (Patient not taking: Reported on 5/28/2019), Disp: 30 tablet, Rfl: 0     ondansetron (ZOFRAN-ODT) 4 MG ODT tab, Take 8 mg by mouth every 8 hours as needed for nausea, Disp: , Rfl:      propranolol (INDERAL) 20 MG tablet, Take 0.5-1 tablets (10-20 mg) by mouth 3 times daily as needed (for anxiety) (Patient not taking: Reported on 5/28/2019), Disp: 60 tablet, Rfl: 0  Immunization History   Administered Date(s) Administered     HPV 01/11/2017, 05/03/2017     HPV9 09/13/2017     Influenza Vaccine IM 3yrs+ 4 Valent IIV4 10/08/2016, 09/13/2017, 12/10/2018     TDAP Vaccine (Adacel) 01/01/2012     Tetanus 01/01/2012     No Known Allergies      EXAM:   /77 (BP Location: Left arm, Patient Position: Sitting, Cuff Size: Adult Large)   Pulse 72   Wt 81.5 kg (179 lb 9.6 oz)   SpO2 98%   BMI 29.89 kg/m     GENERAL APPEARANCE: alert, cooperative and not in distress  SKIN: no rashes, no lesions  HEAD: atraumatic, normocephalic  EYES: lids and lashes normal, conjunctivae and sclerae clear, pupils equal, round, reactive to light, EOM full and intact  ENT: no scars or lesions, nasal exam showed no discharge, swelling or lesions noted, otoscopy showed external auditory canals clear, tympanic membranes normal, tongue midline and normal, soft palate, uvula, and tonsils normal  NECK: no asymmetry, masses, or scars, supple without significant adenopathy  LUNGS: unlabored respirations, no intercostal retractions or accessory muscle use, clear to auscultation without rales or wheezes  HEART: regular rate and rhythm without murmurs and normal S1 and S2  MUSCULOSKELETAL: no musculoskeletal defects are noted  NEURO: no focal deficits noted  PSYCH: does not appear depressed or anxious    WORKUP: None    ASSESSMENT/PLAN:  Mervin Pacheco is a 24 year old female here for evaluation of food allergies. She has been followed by  gastroenterology and recently diagnosed with GERD and gastroparesis. She is concerned that she may have allergies to cow's milk, lettuce, and pineapple. The history provided today is not consistent with IgE mediated allergic reactions to these foods. The symptoms reported to dairy products are consistent with lactose intolerance. While she has experienced symptoms to lettuce and pineapple these symptoms are not concerning for anaphylaxis and the numbness and tingling experienced from the pineapple may be due to enzymes, specifically bromelain, found in pineapple. Mervin's symptoms of random rashes and flushing have not been attributable to any specific food or environmental trigger and are also not consistent with an IgE mediated food allergy. Based on the history skin and specific IgE testing for food allergies is not indicated or recommended. She does have symptoms of food intolerances, specifically lactose intolerance, and may have other sensitivities as well. Unfortunately there is no standardized testing for most intolerances or sensitivities and we discussed management via keeping a food diary and targeted elimination diets.    1. No evidence of IgE mediated food allergies and further evaluation was not recommended at this time.  2. Recommend avoidance of cow's milk or substitution with lactose-free dairy products.  3. Recommend avoidance of lettuce and pineapple due to symptoms of discomfort  4. Follow-up with nutritionist as scheduled. Advised keeping a detailed food and symptom diary prior to this appointment.  5. Follow-up in the allergy clinic as needed      Levi Borrego MD  Allergy/Immunology  [facility] and [place], [place]      Chart documentation done in part with Dragon Voice Recognition Software. Although reviewed after completion, some word and grammatical errors may remain.    Again, thank you for allowing me to participate in the care of your patient.        Sincerely,        Levi Borrego  MD

## 2019-06-11 ENCOUNTER — TRANSFERRED RECORDS (OUTPATIENT)
Dept: HEALTH INFORMATION MANAGEMENT | Facility: CLINIC | Age: 24
End: 2019-06-11

## 2019-07-10 ENCOUNTER — TELEPHONE (OUTPATIENT)
Dept: ALLERGY | Facility: OTHER | Age: 24
End: 2019-07-10

## 2019-07-10 NOTE — TELEPHONE ENCOUNTER
Patient states she did not receive the skin testing kit.  Please call.  Thank You.    Caller informed calls received after 3 pm may not be returned until following day.

## 2019-07-11 ENCOUNTER — OFFICE VISIT (OUTPATIENT)
Dept: ALLERGY | Facility: CLINIC | Age: 24
End: 2019-07-11
Payer: COMMERCIAL

## 2019-07-11 VITALS
BODY MASS INDEX: 29.82 KG/M2 | SYSTOLIC BLOOD PRESSURE: 124 MMHG | HEART RATE: 90 BPM | WEIGHT: 179 LBS | HEIGHT: 65 IN | OXYGEN SATURATION: 97 % | TEMPERATURE: 98 F | DIASTOLIC BLOOD PRESSURE: 81 MMHG

## 2019-07-11 DIAGNOSIS — J30.89 ALLERGIC RHINITIS DUE TO DUST MITE: ICD-10-CM

## 2019-07-11 DIAGNOSIS — K21.9 GASTROESOPHAGEAL REFLUX DISEASE, ESOPHAGITIS PRESENCE NOT SPECIFIED: ICD-10-CM

## 2019-07-11 DIAGNOSIS — J30.1 SEASONAL ALLERGIC RHINITIS DUE TO POLLEN: ICD-10-CM

## 2019-07-11 DIAGNOSIS — K91.1 DUMPING SYNDROME: ICD-10-CM

## 2019-07-11 DIAGNOSIS — L29.9 EAR ITCHING: ICD-10-CM

## 2019-07-11 DIAGNOSIS — E73.9 LACTOSE INTOLERANCE: ICD-10-CM

## 2019-07-11 PROCEDURE — 95004 PERQ TESTS W/ALRGNC XTRCS: CPT | Performed by: ALLERGY & IMMUNOLOGY

## 2019-07-11 PROCEDURE — 99214 OFFICE O/P EST MOD 30 MIN: CPT | Mod: 25 | Performed by: ALLERGY & IMMUNOLOGY

## 2019-07-11 ASSESSMENT — PAIN SCALES - GENERAL: PAINLEVEL: NO PAIN (0)

## 2019-07-11 ASSESSMENT — MIFFLIN-ST. JEOR: SCORE: 1544.88

## 2019-07-11 NOTE — TELEPHONE ENCOUNTER
Patient has consult with provider scheduled today at 1:40.  We do not send out skin testing kits, unclear what she is looking for.  Will contact her this morning.    Marcela Dodd RN

## 2019-07-11 NOTE — TELEPHONE ENCOUNTER
"Patient saw on MyChart that she would be receiving an \"allergy packet\" in the mail.  Explained that this only goes out to new allergy patients, and since she has seen a [facility] allergist recently that she would not need additional paperwork.  She understands and has no additional questions.  Closing encounter.    Marcela Dodd RN    "

## 2019-07-11 NOTE — LETTER
7/11/2019         RE: Mervin Pacheco  [address]  [facility] [address]        Dear Colleague,    Thank you for referring your patient, Mervin Pacheco, to the [facility]. Please see a copy of my visit note below.    Mervin Pacheco is a 24 year old White female with previous medical history significant for GERD, chronic nasal congestion dumping syndrome and esophageal motility disorder who returns for a follow up visit.    Patient presents to me for new evaluation.  She was seen by my partner who thought her symptoms were secondary to gastroenterology or problems as opposed to food allergy.  No testing was obtained.  Patient comes to our office for a second opinion.  She reports that after consuming a cows milk products she will reproducibly developed nausea, vomiting, throat itching and ear itching.  Itching and vomiting are immediate.  Diarrhea is the following day.  This is dose dependent.  Occurs with soft cheese but not hard cheese.  Occurs with milk and ice cream.  She has noticed with avocado, pineapple and let us.  She is able to tolerate cooked pineapple.  Follows with gastroenterology.  She has GERD, esophageal motility disorder and dumping syndrome.  Denies hives, angioedema, shortness of breath, wheezing or chest tightness reproducibly after eating any foods.  She reports daily nasal congestion.  This is first thing in the morning.  Perennial without seasonal worsening.  Cat at home.  Congestion has been present for 3 years.      Past Medical History:   Diagnosis Date     Gastroparesis      GERD (gastroesophageal reflux disease)     esophageal polyp     Family History   Problem Relation Age of Onset     Hypertension Father      Hyperlipidemia Father      Diabetes Father      Stomach Cancer Maternal Grandmother      Breast Cancer Maternal Aunt      Past Surgical History:   Procedure Laterality Date     APPENDECTOMY      4/2002     ENT SURGERY  2011    tonsils        REVIEW OF SYSTEMS:  General: negative for weight gain. negative for weight loss. negative for changes in sleep.   Ears: negative for fullness. negative for hearing loss. negative for dizziness.   Nose: negative for snoring.negative for changes in smell. negative for drainage.   Eyes: negative for eye watering. negative for eye itching. negative for vision changes. negative for eye redness.  Throat: negative for hoarseness. negative for sore throat. negative for trouble swallowing.   Lungs: negative for shortness of breath.negative for wheezing. negative for sputum production.   Cardiovascular: negative for chest pain. negative for swelling of ankles. negative for fast or irregular heartbeat.   Gastrointestinal: negative for nausea. negative for heartburn. negative for acid reflux.   Musculoskeletal: negative for joint pain. negative for joint stiffness. negative for joint swelling.   Neurologic: negative for seizures. negative for fainting. negative for weakness.   Psychiatric: negative for changes in mood. negative for anxiety.   Endocrine: negative for cold intolerance. negative for heat intolerance. negative for tremors.   Lymphatic: negative for lower extremity swelling. negative for lymph node swelling.   Hematologic: negative for easy bruising. negative for easy bleeding.  Integumentary: negative for rash. negative for scaling. negative for nail changes.       Current Outpatient Medications:      etonogestrel-ethinyl estradiol (NUVARING) 0.12-0.015 MG/24HR vaginal ring, Insert 1 ring vaginally every 21 days then remove for 1 week then repeat with new ring., Disp: 3 each, Rfl: 3     hydrOXYzine (ATARAX) 25 MG tablet, Take 1 tablet (25 mg) by mouth every 6 hours as needed for anxiety, Disp: 60 tablet, Rfl: 0     LORazepam (ATIVAN) 0.5 MG tablet, Take 1 tablet (0.5 mg) by mouth every 8 hours as needed for anxiety, Disp: 20 tablet, Rfl: 0     metoclopramide (REGLAN) 10 MG tablet, Take 1 tablet (10 mg) by  mouth 3 times daily as needed (nausea), Disp: 30 tablet, Rfl: 0     omeprazole (PRILOSEC) 20 MG DR capsule, , Disp: , Rfl:      ondansetron (ZOFRAN-ODT) 4 MG ODT tab, Take 8 mg by mouth every 8 hours as needed for nausea, Disp: , Rfl:      propranolol (INDERAL) 20 MG tablet, Take 0.5-1 tablets (10-20 mg) by mouth 3 times daily as needed (for anxiety), Disp: 60 tablet, Rfl: 0  Immunization History   Administered Date(s) Administered     HPV 01/11/2017, 05/03/2017     HPV9 09/13/2017     Influenza Vaccine IM 3yrs+ 4 Valent IIV4 10/08/2016, 09/13/2017, 12/10/2018     TDAP Vaccine (Adacel) 01/01/2012     Tetanus 01/01/2012     No Known Allergies      EXAM:   Constitutional:  Appears well-developed and well-nourished. No distress.   HEENT:   Head: Normocephalic.   Mouth/Throat:   No cobblestoning of posterior oropharynx.   Boggy nasal tissue and pale.    Eyes: Conjunctivae are non-erythematous   No maxillary or frontal sinus tenderness to palpation.   Cardiovascular: Normal rate, regular rhythm and normal heart sounds. Exam reveals no gallop and no friction rub.   No murmur heard.  Respiratory: Effort normal and breath sounds normal. No respiratory distress. No wheezes. No rales.   Musculoskeletal: Normal range of motion.   Neuro: Oriented to person, place, and time.  Skin: Skin is warm and dry. No rash noted.   Psychiatric: Normal mood and affect.     Nursing note and vitals reviewed.      WORKUP:   ENVIRONMENTAL PERCUTANEOUS SKIN TESTING: ADULT  [facility] [employer] 7/11/2019   Consent Y   Ordering Physician Consuelo   Interpreting Physician Consuelo   Testing Technician Marcela MCLAUGHLIN   Location Back   Time start:  2:12 PM   Time End:  2:27 PM   Positive Control: Histatrol*ALK 1 mg/ml 5/42   Negative Control: 50% Glycerin 0   Cat Hair*ALK (10,000 BAU/ml) 0   AP Dog Hair/Dander (1:100 w/v) 0   Dust Mite p. 30,000 AU/ml 5/45   Dust Mite f. (30,000 AU/ml) 5/35   Telly (W/F in millimeters) 0   David Grass (100,000 BAU/mL) 4/12    Red Cedar (W/F in millimeters) 0   Maple/Stevens (W/F in millimeters) 0   Hackberry (W/F in millimeters) 0   [place] (W/F in millimeters) 0   [place] *ALK (W/F in millimeters) 0   American Elm (W/F in millimeters) 0   [place] (W/F in millimeters) 0   Black [place] (W/F in millimeters) 0   Birch Mix (W/F in millimeters) 0   [place] (W/F in millimeters) 0   Oak (W/F in millimeters) 0   Cocklebur (W/F in millimeters) 0   [place] (W/F in millimeters) 0   White Eulogio (W/F in millimeters) 0   Careless (W/F in millimeters) 0   Nettle (W/F in millimeters) 0   English Plantain (W/F in millimeters) 0   Kochia (W/F in millimeters) 0   Lamb's Quarter (W/F in millimeters) 0   Marshelder (W/F in millimeters) 0   Ragweed Mix* ALK (W/F in millimeters) 0   Russian Thistle (W/F in millimeters) 0   Sagebrush/Mugwort (W/F in millimeters) 0   Sheep Sorrel (W/F in millimeters) 0   Feather Mix* ALK (W/F in millimeters) 0   Penicillium Mix (1:10 w/v) 0   Curvularia spicifera (1:10 w/v) 0   Epicoccum (1:10 w/v) 0   Aspergillus fumigatus (1:10 w/v): 0   Alternaria tenius (1:10 w/v) 0   H. Cladosporium (1:10 w/v) 0   Phoma herbarum (1:10 w/v) 0      FOOD ALLERGEN PERCUTANEOUS SKIN TESTING  [employer]  7/11/2019   Consent Y   Ordering Physician Iesha   Interpreting Physician Iesha   Testing Technician Marcela MCLAUGHLIN   Location Back   Time start:  2:12 PM   Time End:  2:27 PM   Positive Control: Histatrol*ALK 1 mg/ml 5/42   Negative Control: 50% Glycerin**[place] Imani 0   Milk, Cow 1:20 (W/F in millimeters) 0   Egg White 1:20 (W/F in millimeters) -   Chicken 1:20 (W/F in millimeters) -   Turkey 1:20 (W/F in millimeters) -   Lamb 1:20 (W/F in millimeters) -   Beef 1:20 (W/F in millimeters) -   Apple 1:40 (W/F in miilimeters) -   Banana  1:40 (W/F in millimeters) -   Peas  1:20 (W/F in millimeters) -   Avocado*ALK (1:10 w/v) -        ASSESSMENT/PLAN:  Problem List Items Addressed This Visit        Nervous and Auditory    Ear itching      Patient reported reproducible throat itching and ear itching after consuming milk products.  However she was able to tolerate hard cheeses.  This was in association to nausea and diarrhea.  Extensive GI pathology including GERD, gastroparesis, esophageal motility disorder.  She is on PPI.         Relevant Orders    ALLERGY SKIN TESTS,ALLERGENS [53383] (Completed)       Respiratory    Seasonal allergic rhinitis due to pollen    Relevant Orders    ALLERGY SKIN TESTS,ALLERGENS [[id number]] (Completed)    Allergic rhinitis due to dust mite     Perennial nasal congestion.    Skin testing positive for dust mites and grass.    -Allergen avoidance measures were discussed and literature provided.  -Flonase 2 sprays per nostril daily.         Relevant Orders    ALLERGY SKIN TESTS,ALLERGENS [[id number]] (Completed)       Digestive    GERD (gastroesophageal reflux disease)    Dumping syndrome    Lactose intolerance     Nausea, diarrhea, burping with milk products.  Negative allergy testing as was done given ear and throat itching associated.  Suspect lactose intolerance.  Has even had symptoms with lactose-free products.  Continue to avoid milk in all forms.               Chart documentation with Dragon Voice recognition Software. Although reviewed after completion, some words and grammatical errors may remain.    Tucker Julian DO FAAAAI  Allergy/Immunology  [place], [place]      Again, thank you for allowing me to participate in the care of your patient.        Sincerely,        Tucker Julian DO

## 2019-07-11 NOTE — ASSESSMENT & PLAN NOTE
Nausea, diarrhea, burping with milk products.  Negative allergy testing as was done given ear and throat itching associated.  Suspect lactose intolerance.  Has even had symptoms with lactose-free products.  Continue to avoid milk in all forms.

## 2019-07-11 NOTE — PROGRESS NOTES
Mervin Pacheco is a 24 year old White female with previous medical history significant for GERD, chronic nasal congestion dumping syndrome and esophageal motility disorder who returns for a follow up visit.    Patient presents to me for new evaluation.  She was seen by my partner who thought her symptoms were secondary to gastroenterology or problems as opposed to food allergy.  No testing was obtained.  Patient comes to our office for a second opinion.  She reports that after consuming a cows milk products she will reproducibly developed nausea, vomiting, throat itching and ear itching.  Itching and vomiting are immediate.  Diarrhea is the following day.  This is dose dependent.  Occurs with soft cheese but not hard cheese.  Occurs with milk and ice cream.  She has noticed with avocado, pineapple and let us.  She is able to tolerate cooked pineapple.  Follows with gastroenterology.  She has GERD, esophageal motility disorder and dumping syndrome.  Denies hives, angioedema, shortness of breath, wheezing or chest tightness reproducibly after eating any foods.  She reports daily nasal congestion.  This is first thing in the morning.  Perennial without seasonal worsening.  Cat at home.  Congestion has been present for 3 years.      Past Medical History:   Diagnosis Date     Gastroparesis      GERD (gastroesophageal reflux disease)     esophageal polyp     Family History   Problem Relation Age of Onset     Hypertension Father      Hyperlipidemia Father      Diabetes Father      Stomach Cancer Maternal Grandmother      Breast Cancer Maternal Aunt      Past Surgical History:   Procedure Laterality Date     APPENDECTOMY      4/2002     ENT SURGERY  2011    tonsils       REVIEW OF SYSTEMS:  General: negative for weight gain. negative for weight loss. negative for changes in sleep.   Ears: negative for fullness. negative for hearing loss. negative for dizziness.   Nose: negative for snoring.negative for changes in smell.  negative for drainage.   Eyes: negative for eye watering. negative for eye itching. negative for vision changes. negative for eye redness.  Throat: negative for hoarseness. negative for sore throat. negative for trouble swallowing.   Lungs: negative for shortness of breath.negative for wheezing. negative for sputum production.   Cardiovascular: negative for chest pain. negative for swelling of ankles. negative for fast or irregular heartbeat.   Gastrointestinal: negative for nausea. negative for heartburn. negative for acid reflux.   Musculoskeletal: negative for joint pain. negative for joint stiffness. negative for joint swelling.   Neurologic: negative for seizures. negative for fainting. negative for weakness.   Psychiatric: negative for changes in mood. negative for anxiety.   Endocrine: negative for cold intolerance. negative for heat intolerance. negative for tremors.   Lymphatic: negative for lower extremity swelling. negative for lymph node swelling.   Hematologic: negative for easy bruising. negative for easy bleeding.  Integumentary: negative for rash. negative for scaling. negative for nail changes.       Current Outpatient Medications:      etonogestrel-ethinyl estradiol (NUVARING) 0.12-0.015 MG/24HR vaginal ring, Insert 1 ring vaginally every 21 days then remove for 1 week then repeat with new ring., Disp: 3 each, Rfl: 3     hydrOXYzine (ATARAX) 25 MG tablet, Take 1 tablet (25 mg) by mouth every 6 hours as needed for anxiety, Disp: 60 tablet, Rfl: 0     LORazepam (ATIVAN) 0.5 MG tablet, Take 1 tablet (0.5 mg) by mouth every 8 hours as needed for anxiety, Disp: 20 tablet, Rfl: 0     metoclopramide (REGLAN) 10 MG tablet, Take 1 tablet (10 mg) by mouth 3 times daily as needed (nausea), Disp: 30 tablet, Rfl: 0     omeprazole (PRILOSEC) 20 MG DR capsule, , Disp: , Rfl:      ondansetron (ZOFRAN-ODT) 4 MG ODT tab, Take 8 mg by mouth every 8 hours as needed for nausea, Disp: , Rfl:      propranolol (INDERAL)  20 MG tablet, Take 0.5-1 tablets (10-20 mg) by mouth 3 times daily as needed (for anxiety), Disp: 60 tablet, Rfl: 0  Immunization History   Administered Date(s) Administered     HPV 01/11/2017, 05/03/2017     HPV9 09/13/2017     Influenza Vaccine IM 3yrs+ 4 Valent IIV4 10/08/2016, 09/13/2017, 12/10/2018     TDAP Vaccine (Adacel) 01/01/2012     Tetanus 01/01/2012     No Known Allergies      EXAM:   Constitutional:  Appears well-developed and well-nourished. No distress.   HEENT:   Head: Normocephalic.   Mouth/Throat:   No cobblestoning of posterior oropharynx.   Boggy nasal tissue and pale.    Eyes: Conjunctivae are non-erythematous   No maxillary or frontal sinus tenderness to palpation.   Cardiovascular: Normal rate, regular rhythm and normal heart sounds. Exam reveals no gallop and no friction rub.   No murmur heard.  Respiratory: Effort normal and breath sounds normal. No respiratory distress. No wheezes. No rales.   Musculoskeletal: Normal range of motion.   Neuro: Oriented to person, place, and time.  Skin: Skin is warm and dry. No rash noted.   Psychiatric: Normal mood and affect.     Nursing note and vitals reviewed.      WORKUP:   ENVIRONMENTAL PERCUTANEOUS SKIN TESTING: ADULT  [place] [employer] 7/11/2019   Consent Y   Ordering Physician Consuelo   Interpreting Physician Consuelo   Testing Technician Marcela MCLAUGHLIN   Location Back   Time start:  2:12 PM   Time End:  2:27 PM   Positive Control: Histatrol*ALK 1 mg/ml 5/42   Negative Control: 50% Glycerin 0   Cat Hair*ALK (10,000 BAU/ml) 0   AP Dog Hair/Dander (1:100 w/v) 0   Dust Mite p. 30,000 AU/ml 5/45   Dust Mite f. (30,000 AU/ml) 5/35   Telly (W/F in millimeters) 0   David Grass (100,000 BAU/mL) 4/12   Red [place] (W/F in millimeters) 0   Maple/[place] (W/F in millimeters) 0   Hackberry (W/F in millimeters) 0   [place] (W/F in millimeters) 0   [place] *ALK (W/F in millimeters) 0   American Elm (W/F in millimeters) 0   [place] (W/F in millimeters) 0    Black [place] (W/F in millimeters) 0   Birch Mix (W/F in millimeters) 0   [place] (W/F in millimeters) 0   Oak (W/F in millimeters) 0   Cocklebur (W/F in millimeters) 0   [place] (W/F in millimeters) 0   White Eulogio (W/F in millimeters) 0   Careless (W/F in millimeters) 0   Nettle (W/F in millimeters) 0   English Plantain (W/F in millimeters) 0   Kochia (W/F in millimeters) 0   Lamb's Quarter (W/F in millimeters) 0   Marshelder (W/F in millimeters) 0   Ragweed Mix* ALK (W/F in millimeters) 0   Russian Thistle (W/F in millimeters) 0   Sagebrush/Mugwort (W/F in millimeters) 0   Sheep Sorrel (W/F in millimeters) 0   Feather Mix* ALK (W/F in millimeters) 0   Penicillium Mix (1:10 w/v) 0   Curvularia spicifera (1:10 w/v) 0   Epicoccum (1:10 w/v) 0   Aspergillus fumigatus (1:10 w/v): 0   Alternaria tenius (1:10 w/v) 0   H. Cladosporium (1:10 w/v) 0   Phoma herbarum (1:10 w/v) 0      FOOD ALLERGEN PERCUTANEOUS SKIN TESTING  [employer]  7/11/2019   Consent Y   Ordering Physician Iesha   Interpreting Physician Iesha   Testing Technician Marcela MCLAUGHLIN   Location Back   Time start:  2:12 PM   Time End:  2:27 PM   Positive Control: Histatrol*ALK 1 mg/ml 5/42   Negative Control: 50% Glycerin**[place] Imani 0   Milk, Cow 1:20 (W/F in millimeters) 0   Egg White 1:20 (W/F in millimeters) -   Chicken 1:20 (W/F in millimeters) -   Turkey 1:20 (W/F in millimeters) -   Lamb 1:20 (W/F in millimeters) -   Beef 1:20 (W/F in millimeters) -   Apple 1:40 (W/F in miilimeters) -   Banana  1:40 (W/F in millimeters) -   Peas  1:20 (W/F in millimeters) -   Avocado*ALK (1:10 w/v) -        ASSESSMENT/PLAN:  Problem List Items Addressed This Visit        Nervous and Auditory    Ear itching     Patient reported reproducible throat itching and ear itching after consuming milk products.  However she was able to tolerate hard cheeses.  This was in association to nausea and diarrhea.  Extensive GI pathology including GERD, gastroparesis, esophageal  motility disorder.  She is on PPI.         Relevant Orders    ALLERGY SKIN TESTS,ALLERGENS [[id number]] (Completed)       Respiratory    Seasonal allergic rhinitis due to pollen    Relevant Orders    ALLERGY SKIN TESTS,ALLERGENS [[id number]] (Completed)    Allergic rhinitis due to dust mite     Perennial nasal congestion.    Skin testing positive for dust mites and grass.    -Allergen avoidance measures were discussed and literature provided.  -Flonase 2 sprays per nostril daily.         Relevant Orders    ALLERGY SKIN TESTS,ALLERGENS [[id number]] (Completed)       Digestive    GERD (gastroesophageal reflux disease)    Dumping syndrome    Lactose intolerance     Nausea, diarrhea, burping with milk products.  Negative allergy testing as was done given ear and throat itching associated.  Suspect lactose intolerance.  Has even had symptoms with lactose-free products.  Continue to avoid milk in all forms.               Chart documentation with Dragon Voice recognition Software. Although reviewed after completion, some words and grammatical errors may remain.    Tucker Julian DO FAAAAI  Allergy/Immunology  [place], [place]

## 2019-07-11 NOTE — ASSESSMENT & PLAN NOTE
Perennial nasal congestion.    Skin testing positive for dust mites and grass.    -Allergen avoidance measures were discussed and literature provided.  -Flonase 2 sprays per nostril daily.

## 2019-07-11 NOTE — ASSESSMENT & PLAN NOTE
Patient reported reproducible throat itching and ear itching after consuming milk products.  However she was able to tolerate hard cheeses.  This was in association to nausea and diarrhea.  Extensive GI pathology including GERD, gastroparesis, esophageal motility disorder.  She is on PPI.

## 2019-07-11 NOTE — PATIENT INSTRUCTIONS
Allergy Staff Appt Hours Shot Hours Locations    Physician     Tucker Julian DO       Support Staff     AMAIRANI Akbar, ABIMAEL  Tuesday:        [place] 7-4:20     Wednesday:        [place]: 7-5 Thursday:                    [place] 7-6:40     Friday:        [place] 7-2:40   [place]        Thursday: 1-5:50        Friday: 7-10:50     [place]        Tuesday: 7- 3:20        Wednesday: 7-4:20     [place] Monday: 7-4:20        Tuesday: 1-6:20         [facility]  [address] [place] [address]  Appt Line: [phone]  Allergy RN:  [phone]    [facility]  [address] [place] [address]  Appt Line: [phone]  Allergy RN:  [phone]       Important Scheduling Information  Aspirin Desensitization: Appt will last 2 clinic days. Please call the Allergy RN line for your clinic to schedule. Discontinue antihistamines 7 days prior to the appointment.     Food Challenges: Appt will last 3-4 hours. Please call the Allergy RN line for your clinic to schedule. Discontinue antihistamines 7 days prior to the appointment.     Penicillin Testing: Appt will last 2-3 hours. Please call the Allergy RN line for your clinic to schedule. Discontinue antihistamines 7 days prior to the appointment.     Skin Testing: Appt will about 40 minutes. Call the appointment line for your clinic to schedule. Discontinue antihistamines 7 days prior to the appointment.     Venom Testing: Appt will last 2-3 hours. Please call the Allergy RN line for your clinic to schedule. Discontinue antihistamines 7 days prior to the appointment.     Thank you for trusting us with your Allergy, Asthma, and Immunology care. Please feel free to contact us with any questions or concerns you may have.      - Flonase 2 sprays/nostril daily.   - Continue follow up with GI. Lactose intolerance suspected. Avoid all milk products.    AEROALLERGEN AVOIDANCE INSTRUCTIONS  POLLEN  Pollens are the tiny airborne particles  given off by trees, weeds, and grasses. They can be the cause of seasonal allergic rhinitis or hay fever symptoms, which include stuffy, itchy, runny nose, redness, swelling and itching of the eyes, and itching of the ears and throat. Here are some tips on how to avoid pollen exposure.  1. .Keep windows closed and use the air conditioner when possible.  2.  Avoid outside exposure in the early morning as pollen counts are highest at that time.  3.  Take a shower and wash hair each night.  4.  Consider wearing a mask when working in the yard and/or garden.  5.  Clean furnace filter monthly with HEPA filters. Consider a HEPA filter vacuum  which will prevent pollen from being reintroduced into the air.   DUST MITES  Dust mites can never be entirely eliminated in the house no matter how clean your house is. Dust mites are attracted to warm, moist areas and feed on dead skin flakes. Here are tips to minimize dust mites in your home.  1.  Encase pillows and mattress/box springs in zippered allergy covers.  2.  Wash bedding in hot water (at least 130 F) every 7-14 days.  3.  Avoid curtains, carpet, and upholstered furniture if possible.  4.  Use HEPA air filters and a HEPA filter vacuum . Change filters monthly. Vacuum weekly.  5.  Keep bedroom simple, avoiding clutter, so it can quickly be dusted.  6.  Cover heating vents with vent filters.  7.  Keep stuffed toys in a closed container and wash or freeze regularly.  8.  Keep clothing in the closet with the door closed.

## 2019-09-10 NOTE — TELEPHONE ENCOUNTER
RECORDS RECEIVED FROM: Internal    DATE RECEIVED: 10/01/19   NOTES STATUS DETAILS   OFFICE NOTE from referring provider Internal OV note 12/10/18    OFFICE NOTE from other specialist Received  [place] recs scanned in epic    DISCHARGE SUMMARY from hospital N/A    OPERATIVE REPORT N/A    MEDICATION LIST Internal         ENDOSCOPY  Received 3/1/19   COLONOSCOPY N/A    ERCP N/A    EUS N/A    STOOL TESTING N/A    PERTINENT LABS Internal    PATHOLOGY REPORTS (RELATED) N/A    IMAGING (CT, MRI, EGD) Internal [place] Gastric Emptying 4/5/19      REFERRAL INFORMATION    Date referral was placed: 10/01/19   Date all records received: N/A   Date records were scanned into EPIC: N/A    Date records were sent to Provider to review: N/A   Date and recommendation received from provider:  LETTER SENT  SCHEDULE APPOINTMENT   Date patient was contacted to schedule: 9/9/19

## 2019-09-30 ASSESSMENT — ENCOUNTER SYMPTOMS
HALLUCINATIONS: 0
INCREASED ENERGY: 0
POLYPHAGIA: 0
NIGHT SWEATS: 0
TASTE DISTURBANCE: 0
NUMBNESS: 0
BLOATING: 1
WEAKNESS: 0
VOMITING: 0
SEIZURES: 0
BLOOD IN STOOL: 0
FATIGUE: 0
MEMORY LOSS: 0
SMELL DISTURBANCE: 0
POLYDIPSIA: 0
CONSTIPATION: 0
CHILLS: 0
HOARSE VOICE: 0
LOSS OF CONSCIOUSNESS: 0
ABDOMINAL PAIN: 1
SORE THROAT: 0
WEIGHT LOSS: 0
WEIGHT GAIN: 0
RECTAL PAIN: 0
NAUSEA: 1
DEPRESSION: 0
PANIC: 1
SPEECH CHANGE: 0
HEARTBURN: 1
DECREASED APPETITE: 0
PARALYSIS: 0
TROUBLE SWALLOWING: 0
ALTERED TEMPERATURE REGULATION: 0
DIZZINESS: 0
BOWEL INCONTINENCE: 0
DECREASED CONCENTRATION: 0
FEVER: 0
SINUS CONGESTION: 1
NERVOUS/ANXIOUS: 1
SINUS PAIN: 0
DISTURBANCES IN COORDINATION: 0
TREMORS: 0
INSOMNIA: 1
NECK MASS: 0
JAUNDICE: 0
DIARRHEA: 1
HEADACHES: 1
TINGLING: 0

## 2019-10-01 ENCOUNTER — OFFICE VISIT (OUTPATIENT)
Dept: GASTROENTEROLOGY | Facility: CLINIC | Age: 24
End: 2019-10-01
Attending: FAMILY MEDICINE
Payer: COMMERCIAL

## 2019-10-01 ENCOUNTER — PRE VISIT (OUTPATIENT)
Dept: GASTROENTEROLOGY | Facility: CLINIC | Age: 24
End: 2019-10-01

## 2019-10-01 VITALS
DIASTOLIC BLOOD PRESSURE: 70 MMHG | BODY MASS INDEX: 30.21 KG/M2 | OXYGEN SATURATION: 95 % | WEIGHT: 181.3 LBS | TEMPERATURE: 98.3 F | SYSTOLIC BLOOD PRESSURE: 118 MMHG | HEIGHT: 65 IN | HEART RATE: 73 BPM

## 2019-10-01 DIAGNOSIS — R13.14 PHARYNGOESOPHAGEAL DYSPHAGIA: Primary | ICD-10-CM

## 2019-10-01 DIAGNOSIS — R14.2 BELCHING: ICD-10-CM

## 2019-10-01 ASSESSMENT — MIFFLIN-ST. JEOR: SCORE: 1555.31

## 2019-10-01 ASSESSMENT — PAIN SCALES - GENERAL: PAINLEVEL: NO PAIN (0)

## 2019-10-01 NOTE — NURSING NOTE
"Chief Complaint   Patient presents with     New Patient     New consult, GERD       Vitals:    10/01/19 1518   BP: 118/70   Pulse: 73   Temp: 98.3  F (36.8  C)   TempSrc: Oral   SpO2: 95%   Weight: 82.2 kg (181 lb 4.8 oz)   Height: 1.638 m (5' 4.5\")       Body mass index is 30.64 kg/m .    Mily Parrish CMA    "

## 2019-10-01 NOTE — NURSING NOTE
AVS given to patient. Appointment set up with Candice Chinchilla. Swallow study and esophagram scheduled.

## 2019-10-01 NOTE — LETTER
10/1/2019       RE: Mervin Pacheco  [address]  [facility] [address]     Dear Colleague,    Thank you for referring your patient, Mervin Pacheco, to the [facility] GASTROENTEROLOGY AND IBD CLINIC at [facility]. Please see a copy of my visit note below.    GI CLINIC VISIT    CC/REFERRING MD:  Terry Demarco    REASON FOR CONSULTATION:   The pt is a 24 year old female who I was asked to see in consultation at the request of Dr. Terry Demarco for belching and dysphagia.     ASSESSMENT/PLAN:  25 yo woman with anxiety who presents for belching and dysphagia x5 years.     # Dysphagia to solids and liquids  Feels like food gets stuck at levels of cricopharyngeus and clavicles before passing, occasionally regurgitates/refluxes when bending over (acidic) or belching (non acidic). Does chew food again- not c/w rumination. No food impaction. No structural abnormalities aside from esophageal/cardia polyp that was biopsied (not resected) during EGD 3/1/19, consistent with reflux related changes. Manometry c/w IEM- peristalsis intact. EoE ruled out.  - video swallow and esophagram  - increase PPI to high dose x2 weeks as empiric trial    # Belching  Frequent belching throughout the day. May be related to acid reflux and clearing acid bolus, vs supradiaphragmatic belching vs aerophagia.   - PPI trial as above  - referral to health psychology for diaphragmatic breathing    # GERD  Changes related to reflux on recent biopsies of distal esophagus and polyp, although endoscopically no esophagitis. May be related to recent weight gain (152lbs 11/2017, 181lbs 10/2019). Asymptomatic prior to starting PPI, now notices symptoms if she forgets a PPI dose. Gastric emptying study not consistent with delayed gastric emptying.   - discuss weight loss at future appointment, not covered today  - omeprazole 40mg BID x2 weeks    RTC 3 months    Thank you for this  consultation.  It was a pleasure to participate in the care of this patient; please contact us with any further questions.     Hernandez Coreas MD    Division of Gastroenterology, Hepatology and Nutrition  [facility]    HPI  23 yo woman with anxiety who presents for belching and dysphagia x5 years.     She describes food and liquids sticking at the level of the cricopharyngeus and clavicles for the past 5 years. Usually food goes down but occasionally she will regurgitate after meals if she bends over (which is acidic) or if she belches (non acidid), ~once/week. She notices worse symptoms with lettuce, chicken, but still sometimes with liquids. Symptoms are not progressive. Never had food impaction. She had an EGD 3/1/19 which demonstrated 10mm polyp at distal esophagus/high cardia which was consistent with reactive changes from reflux- biopsied, not resected. She was started on omeprazole 20mg once per day with no improvement.     She also belches throughout the day continuously. She does not notice swallowing air. She doesn't chew gum, drink carbonated beverages, use straws, etc. No symptoms at night. She has regular bowel movements.     She underwent gastric emptying study that demonstrated normal gastric emptying at 4 hours (3%). She underwent manometry which was negative for hiatal hernia, had mean IRP 11.7, mean .  She had 2 swallows with IRP greater than 15.  She had ineffective motility on 5 swallows and no failed swallows. Bolus clearance was read as incomplete for 50% of swallows.     ROS:    No fevers or chills  No weight loss  No blurry vision, double vision or change in vision  No sore throat  No lymphadenopathy  No headache, paraesthesias, or weakness in a limb  No shortness of breath or wheezing  No chest pain or pressure  No arthralgias or myalgias  No rashes or skin changes  +dysphagia  No BRBPR, hematochezia, melena  No dysuria, frequency or urgency  No hot/cold  intolerance or polyria  +anxiety    PROBLEM LIST  Patient Active Problem List    Diagnosis Date Noted     Seasonal allergic rhinitis due to pollen 07/11/2019     Priority: Medium     Allergic rhinitis due to dust mite 07/11/2019     Priority: Medium     Ear itching 07/11/2019     Priority: Medium     GERD (gastroesophageal reflux disease) 07/11/2019     Priority: Medium     esophageal polyp       Dumping syndrome 07/11/2019     Priority: Medium     Lactose intolerance 07/11/2019     Priority: Medium     Vulvodynia 04/20/2018     Priority: Medium     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/2018     Priority: Medium     PERTINENT PAST MEDICAL HISTORY:  Past Medical History:   Diagnosis Date     Anxiety Age 22     Esophageal reflux Age 8    Didnt notice as i aged until 23     Gastroparesis      Genital problems Age 22    Genito pelvic pain     GERD (gastroesophageal reflux disease)     esophageal polyp     PREVIOUS SURGERIES:  Past Surgical History:   Procedure Laterality Date     APPENDECTOMY      4/2002     ENT SURGERY  2011    tonsils     PREVIOUS ENDOSCOPY:  EGD 3/1/19  Z line 38 cm, biopsied from mid and distal  10mm esophageal polyp at GE junction biopsied with cold forceps  Normal stomach  Normal duodenum    ALLERGIES:   No Known Allergies    PERTINENT MEDICATIONS:    Current Outpatient Medications:      etonogestrel-ethinyl estradiol (NUVARING) 0.12-0.015 MG/24HR vaginal ring, Insert 1 ring vaginally every 21 days then remove for 1 week then repeat with new ring., Disp: 3 each, Rfl: 3     hydrOXYzine (ATARAX) 25 MG tablet, Take 1 tablet (25 mg) by mouth every 6 hours as needed for anxiety, Disp: 60 tablet, Rfl: 0     LORazepam (ATIVAN) 0.5 MG tablet, Take 1 tablet (0.5 mg) by mouth every 8 hours as needed for anxiety, Disp: 20 tablet, Rfl: 0     omeprazole (PRILOSEC) 20 MG DR capsule, , Disp: , Rfl:      ondansetron (ZOFRAN-ODT) 4 MG ODT tab, Take 8 mg by mouth every 8 hours as needed for nausea, Disp: ,  Rfl:      propranolol (INDERAL) 20 MG tablet, Take 0.5-1 tablets (10-20 mg) by mouth 3 times daily as needed (for anxiety), Disp: 60 tablet, Rfl: 0    SOCIAL HISTORY:  Social History     Socioeconomic History     Marital status:      Spouse name: Not on file     Number of children: Not on file     Years of education: Not on file     Highest education level: Not on file   Occupational History     Not on file   Social Needs     Financial resource strain: Not on file     Food insecurity:     Worry: Not on file     Inability: Not on file     Transportation needs:     Medical: Not on file     Non-medical: Not on file   Tobacco Use     Smoking status: Never Smoker     Smokeless tobacco: Never Used   Substance and Sexual Activity     Alcohol use: Yes     Comment: less than 1x a week     Drug use: No     Sexual activity: Yes     Partners: Male     Birth control/protection: Inserts/Ring   Lifestyle     Physical activity:     Days per week: Not on file     Minutes per session: Not on file     Stress: Not on file   Relationships     Social connections:     Talks on phone: Not on file     Gets together: Not on file     Attends [employer] service: Not on file     Active member of club or organization: Not on file     Attends meetings of clubs or organizations: Not on file     Relationship status: Not on file     Intimate partner violence:     Fear of current or ex partner: Not on file     Emotionally abused: Not on file     Physically abused: Not on file     Forced sexual activity: Not on file   Other Topics Concern     Parent/sibling w/ CABG, MI or angioplasty before 65F 55M? Not Asked   Social History Narrative     Not on file     FAMILY HISTORY:  Family History   Problem Relation Age of Onset     Obesity Mother      Velasquez's esophagus Mother      Other - See Comments Mother         eosinophilic esophagitis     Hypertension Father      Hyperlipidemia Father      Diabetes Father      Anxiety Disorder Father      Stomach  "Cancer Maternal Grandmother      Breast Cancer Maternal Aunt      Diabetes Paternal Grandfather      Diabetes Paternal Grandmother      Other Cancer Paternal Grandmother         Stomach cancer     Breast Cancer Other      Anxiety Disorder Sister    Past/family/social history reviewed and no changes    PHYSICAL EXAMINATION:  Constitutional: aaox3, cooperative, pleasant, not dyspneic/diaphoretic, no acute distress  Vitals reviewed: /70   Pulse 73   Temp 98.3  F (36.8  C) (Oral)   Ht 1.638 m (5' 4.5\")   Wt 82.2 kg (181 lb 4.8 oz)   SpO2 95%   BMI 30.64 kg/m     Wt:   Wt Readings from Last 2 Encounters:   10/01/19 82.2 kg (181 lb 4.8 oz)   07/11/19 81.2 kg (179 lb)     Eyes: Sclera anicteric/injected  Ears/nose/mouth/throat: Normal oropharynx without ulcers or exudate, mucus membranes moist, hearing intact  Neck: supple, thyroid normal size  CV: No edema  Respiratory: Unlabored breathing  Lymph: No cervical lymphadenopathy  Abd: Nondistended, +bs, no hepatosplenomegaly, nontender, no peritoneal signs  Skin: warm, perfused, no jaundice  Psych: Normal affect  MSK: Normal gait    PERTINENT STUDIES:  Office Visit on 07/18/2018   Component Date Value Ref Range Status     PAP 07/18/2018 NIL   Final     Copath Report 07/18/2018    Final                    Value:  Patient Name: NATANAEL WADE  MR#: [id number]  Specimen #: [id number]  Collected: 7/18/2018  Received: 7/19/2018  Reported: 7/20/2018 14:11  Ordering Phy(s): DOUG PAULA    For improved result formatting, select 'View Enhanced Report Format' under   Linked Documents section.    SPECIMEN/STAIN PROCESS:  Pap imaged thin layer prep screening (Surepath, FocalPoint with guided   screening)       Pap-Cyto x 1, Pap with reflex to HPV if ASCUS x 1    SOURCE: Cervical, endocervical  ----------------------------------------------------------------   Pap imaged thin layer prep screening (Surepath, FocalPoint with guided   screening)  SPECIMEN " ADEQUACY:  Satisfactory for evaluation.  -Transformation zone component present.    CYTOLOGIC INTERPRETATION:    Negative for intraepithelial lesion or malignancy    Electronically signed out by:  GHAZALA Scott (ASCP)    Processed and screened at [facility]    CLINICAL HISTORY:  LMP: 6/21/20                          18  Previous normal pap  Date of Last Pap: 9/1/2015,    Papanicolaou Test Limitations:  Cervical cytology is a screening test with   limited sensitivity; regular  screening is critical for cancer prevention; Pap tests are primarily   effective for the diagnosis/prevention of  squamous cell carcinoma, not adenocarcinomas or other cancers.    TESTING LAB LOCATION:  [facility], [address]  [address]  [phone]    COLLECTION SITE:  Client:  [facility]  Location: SUSI SANTOS (B) Gastric emptying 4/5/19  FINDINGS:  Percent retention at 60 min = 81%.  Percent retention at 120 min = 53%.  Percent retention at 180 min = 11%.  Percent retention at 240 min = 3%.   T 1/2 emptying time =  127 mins.    Again, thank you for allowing me to participate in the care of your patient.      Sincerely,    Hernandez Coreas MD

## 2019-10-01 NOTE — PATIENT INSTRUCTIONS
It was a pleasure taking care of you today.  I've included a brief summary of our discussion and care plan from today's visit below.  Please review this information with your primary care provider.  _______________________________________________________________________    My recommendations are summarized as follows:  1. Increase omeprazole from 20mg once per day to 40mg twice per day 30 min before meals for 2 weeks. Give us an update if this is helpful.   2. Schedule a video swallow study and a barium esophagram.   Speech pathology will conduct studies here in Radiology [facility] 10/17 at 10am  3. Schedule an appointment with Candice Jones of health psychology to work on diaphragmatic breathing.   11/25 1pm with Candice Jones    Thanks - Tara Schaeffer RN Care Coordinator for Dr. Hernandez Coreas [phone]      --    Return to GI Clinic in 3 months to review your progress.    _______________________________________________________________________    Who do I call with any questions after my visit?  Please be in touch if there are any further questions that arise following today's visit.  There are multiple ways to contact your gastroenterology care team.        During business hours, you may reach a Gastroenterology nurse at [phone] and choose option 3.         To schedule or reschedule an appointment, please call [phone].       You can always send a secure message through Zuora.  Zuora messages are answered by your nurse or doctor typically within 24 hours.  Please allow extra time on weekends and holidays.        For urgent/emergent questions after business hours, you may reach the on-call GI Fellow by contacting the [facility] at [phone].     How will I get the results of any tests ordered?    You will receive all of your results.  If you have signed up for MyChart, any tests ordered at your visit will be available to you after your physician reviews them.  Typically this takes 1-2  weeks.  If there are urgent results that require a change in your care plan, your physician or nurse will call you to discuss the next steps.      What is [employer]?  [employer] is a secure way for you to access all of your healthcare records from the [facility].  It is a web based computer program, so you can sign on to it from any location.  It also allows you to send secure messages to your care team.  I recommend signing up for 121nexus access if you have not already done so and are comfortable with using a computer.      How to I schedule a follow-up visit?  If you did not schedule a follow-up visit today, please call [phone] to schedule a follow-up office visit.        Sincerely,    Hernandez Coreas MD     [facility]  Division of Gastroenterology

## 2019-10-07 DIAGNOSIS — Z30.015 ENCOUNTER FOR INITIAL PRESCRIPTION OF VAGINAL RING HORMONAL CONTRACEPTIVE: ICD-10-CM

## 2019-10-07 RX ORDER — ETONOGESTREL AND ETHINYL ESTRADIOL VAGINAL RING .015; .12 MG/D; MG/D
RING VAGINAL
Qty: 3 EACH | Refills: 3 | Status: SHIPPED | OUTPATIENT
Start: 2019-10-07 | End: 2020-09-24

## 2019-10-17 ENCOUNTER — THERAPY VISIT (OUTPATIENT)
Dept: SPEECH THERAPY | Facility: CLINIC | Age: 24
End: 2019-10-17
Attending: INTERNAL MEDICINE
Payer: COMMERCIAL

## 2019-10-17 ENCOUNTER — ANCILLARY PROCEDURE (OUTPATIENT)
Dept: GENERAL RADIOLOGY | Facility: CLINIC | Age: 24
End: 2019-10-17
Attending: INTERNAL MEDICINE
Payer: COMMERCIAL

## 2019-10-17 DIAGNOSIS — R13.14 PHARYNGOESOPHAGEAL DYSPHAGIA: ICD-10-CM

## 2019-10-17 DIAGNOSIS — R14.2 BELCHING: ICD-10-CM

## 2019-10-17 DIAGNOSIS — K21.9 GASTROESOPHAGEAL REFLUX DISEASE, ESOPHAGITIS PRESENCE NOT SPECIFIED: Primary | ICD-10-CM

## 2019-10-17 RX ORDER — BARIUM SULFATE 400 MG/ML
20 SUSPENSION ORAL ONCE
Status: COMPLETED | OUTPATIENT
Start: 2019-10-17 | End: 2019-10-17

## 2019-10-17 RX ORDER — BARIUM SULFATE 400 MG/ML
20 SUSPENSION ORAL ONCE
Status: ACTIVE | OUTPATIENT
Start: 2019-10-17

## 2019-10-17 RX ADMIN — BARIUM SULFATE 20 ML: 400 SUSPENSION ORAL at 10:30

## 2019-10-18 NOTE — PROGRESS NOTES
10/17/19 1000   General Information   Type Of Visit Initial   Start Of Care Date 10/17/19   Referring Physician Hernandez Coreas MD    Orders Evaluate And Treat   Orders Comment Dysphagia    Medical Diagnosis Esophagitis    Onset Of Illness/injury Or Date Of Surgery   (Ongoing for ~5 years per pt report )   Precautions/limitations No Known Precautions/limitations   Hearing WFL for conversational speech production    Pertinent History of Current Problem/OT: Additional Occupational Profile Info Pt is a 23 y/o female who presents for a video swallow study and esophagram.  Pt c/o ongoing dysphagia with persistent burping for ~5 years time.  Pt characterizes her dysphagia as a sense of items remaining in the cricothyroid region which may 'circles around' and may remain for several hours.  Pt reports that this sensation occurs on both liquids and solids and occurs randomly throughout the day.  Pt reports that she burps ~200-400x/day.  Pt has a formal diagnosis of reflux and is on Prilosec for management.  Esophagitis has been questioned but pt reports no formal diagnosis of esophagitis.  Pt reports that she is not modifiing her eating behaviors in attempt to control symptoms.  Pt denies a history of CA, stroke, or TBI.  No unintended weight loss.  Pt reports PNA last fall.     Respiratory Status Room air   Prior Level Of Function Swallowing   Prior Level Of Function Comment Regular textures and thin liquids   General Observations Pt pleasant and cooperative.    Patient/family Goals Pt would like to know what is going on with her swallowing.    Clinical Swallow Evaluation   Oral Musculature generally intact   Structural Abnormalities none present   Dentition present and adequate   Mucosal Quality good   Mandibular Strength and Mobility intact   Oral Labial Strength and Mobility WFL   Lingual Strength and Mobility WFL   Velar Elevation intact   Buccal Strength and Mobility intact   Laryngeal Function Cough;Throat  clear;Swallow;Voicing initiated   Oral Musculature Comments WNL    Additional Documentation Yes   Rationale for completing additional evaluation Globus sensation at cricothyroid region.  VFSS indicated to fully view pharyngeal swallow, r/o aspiration, and view presence/extent of residue.    VFSS Eval: Radiology   Radiologist [facility] Radiologist    Views Taken left lateral   Physical Location of Procedure [facility] Radiology #2   VFSS Eval: Thin Liquid Texture Trial   Mode of Presentation, Thin Liquid cup;straw;self-fed   Order of Presentation 1, 2, 3, 6, 7, 12   Preparatory Phase WFL   Oral Phase, Thin Liquid WFL   Pharyngeal Phase, Thin Liquid WFL   Rosenbek's Penetration Aspiration Scale: Thin Liquid Trial Results 1 - no aspiration, contrast does not enter airway   Diagnostic Statement Functional swallow for thin liquids.  No aspiration or penetration observed.    VFSS Eval: Nectar Thick Liquid Texture Trial   Mode of Presentation, Nectar cup;self-fed   Order of Presentation 4, 5   Preparatory Phase WFL   Oral Phase, Nectar WFL   Pharyngeal Phase, [place] WFL   Rosenbek's Penetration Aspiration Scale: Nectar-Thick Liquid Trial Results 1 - no aspiration, contrast does not enter airway   Diagnostic Statement Functional swallow for nectar thick liquids.  No overt s/s of aspiration.    VFSS Eval: Puree Solid Texture Trial   Mode of Presentation, Puree spoon;self-fed   Order of Presentation 8, 9   Preparatory Phase WFL   Oral Phase, Puree Residue in oral cavity  (Minimal amounts)   Pharyngeal Phase, Puree WFL   Rosenbek's Penetration Aspiration Scale: Puree Food Trial Results 1 - no aspiration, contrast does not enter airway   Diagnostic Statement Functional swallow for pudding textures.  No aspiration or penetration.    VFSS Eval: Solid Food Texture Trial   Mode of Presentation, Solid self-fed   Order of Presentation 10, 11, 13   (13- barium tablet)   Preparatory Phase WFL   Oral Phase, Solid Residue in oral  cavity  (Minimal amounts)   Pharyngeal Phase, Solid WFL   Rosenbek's Penetration Aspiration Scale: Solid Food Trial Results 1 - no aspiration, contrast does not enter airway   Diagnostic Statement Functional swallow for solid textures.  No aspiration or penetration.     Swallow Compensations   Swallow Compensations No compensations were used   Educational Assessment   Barriers to Learning No barriers   Esophageal Phase of Swallow   Patient reports or presents with symptoms of esophageal dysphagia Yes   Esophageal sweep performed during today s vidofluoroscopic exam  Please refer to radiologist's report for details   Swallow Eval: Clinical Impressions   Skilled Criteria for Therapy Intervention No problems identified which require skilled intervention   Dysphagia Outcome Severity Scale (SPARKLE) Level 7 - SPARKLE   Treatment Diagnosis R/o oropharyngeal dysphagia    Diet texture recommendations Regular diet;Thin liquids   Recommended Feeding/Eating Techniques other (see comments)  (GI/reflux precautions )   Demonstrates Need for Referral to Another Service other (see comments)  (GI )   Anticipated Discharge Disposition home   Risks and Benefits of Treatment have been explained. Yes   Patient, family and/or staff in agreement with Plan of Care Yes   Clinical Impression Comments Pt was seen for a video swallow study to assess function and r/o aspiration in the setting of globus sensation and frequent burping.  Darrell mechanism exam was unremarkable.  Under flouroscopy, pt is presenting with oral and pharyngeal swalllow responses that all WNL.  Pt exhibited no aspiration or penetration.  No pharyngeal residue was present upon trials of thin liquids, purees, or solid textures.  An esophagram was completed following the video swallow study; please see the radiologist report for a detailed description.  At this time, it is recommended that pt continue with a regular texture diet and thin liquids.  Pt should implement GI/reflux  precautions in order to maximize ease of swallow.  It is further recommended that pt f/u with GI service.  Further ST intervention is not indicated at this time.     Total Session Time   SLP Eval: oral/pharyngeal swallow function, clinical minutes (89395) 15   SLP Eval: VideoFluoroscopic Swallow function Minutes (17928) 25   Total Evaluation Time 40     Thank you for the referral of Mervin Pacheco.  If you have any questions about this report, please contact me using the information below.       Cookie Wahl M.S. CCC-SLP  Speech Language Pathologist   [facility]   Department of Otoolaryngology, [address]T- 4th Floor / [id number]  Phone: [phone]  Pager: [phone]  Email: [email or web address]

## 2019-10-25 ENCOUNTER — OFFICE VISIT (OUTPATIENT)
Dept: FAMILY MEDICINE | Facility: CLINIC | Age: 24
End: 2019-10-25
Payer: COMMERCIAL

## 2019-10-25 VITALS
DIASTOLIC BLOOD PRESSURE: 82 MMHG | HEIGHT: 65 IN | WEIGHT: 180 LBS | BODY MASS INDEX: 29.99 KG/M2 | SYSTOLIC BLOOD PRESSURE: 120 MMHG | OXYGEN SATURATION: 97 % | TEMPERATURE: 98.5 F | HEART RATE: 76 BPM | RESPIRATION RATE: 16 BRPM

## 2019-10-25 DIAGNOSIS — F41.9 ANXIETY: ICD-10-CM

## 2019-10-25 DIAGNOSIS — Z53.20 HIV SCREENING DECLINED: Primary | ICD-10-CM

## 2019-10-25 PROCEDURE — 99214 OFFICE O/P EST MOD 30 MIN: CPT | Performed by: FAMILY MEDICINE

## 2019-10-25 RX ORDER — LORAZEPAM 0.5 MG/1
0.5 TABLET ORAL EVERY 8 HOURS PRN
Qty: 20 TABLET | Refills: 0 | Status: SHIPPED | OUTPATIENT
Start: 2019-10-25 | End: 2021-01-11

## 2019-10-25 RX ORDER — BUSPIRONE HYDROCHLORIDE 5 MG/1
TABLET ORAL
Qty: 150 TABLET | Refills: 1 | Status: SHIPPED | OUTPATIENT
Start: 2019-10-25 | End: 2019-12-14

## 2019-10-25 ASSESSMENT — ANXIETY QUESTIONNAIRES
1. FEELING NERVOUS, ANXIOUS, OR ON EDGE: NEARLY EVERY DAY
4. TROUBLE RELAXING: NEARLY EVERY DAY
GAD7 TOTAL SCORE: 15
5. BEING SO RESTLESS THAT IT IS HARD TO SIT STILL: SEVERAL DAYS
GAD7 TOTAL SCORE: 15
6. BECOMING EASILY ANNOYED OR IRRITABLE: NEARLY EVERY DAY
3. WORRYING TOO MUCH ABOUT DIFFERENT THINGS: MORE THAN HALF THE DAYS
7. FEELING AFRAID AS IF SOMETHING AWFUL MIGHT HAPPEN: NOT AT ALL
2. NOT BEING ABLE TO STOP OR CONTROL WORRYING: NEARLY EVERY DAY
GAD7 TOTAL SCORE: 15
7. FEELING AFRAID AS IF SOMETHING AWFUL MIGHT HAPPEN: NOT AT ALL

## 2019-10-25 ASSESSMENT — PATIENT HEALTH QUESTIONNAIRE - PHQ9
10. IF YOU CHECKED OFF ANY PROBLEMS, HOW DIFFICULT HAVE THESE PROBLEMS MADE IT FOR YOU TO DO YOUR WORK, TAKE CARE OF THINGS AT HOME, OR GET ALONG WITH OTHER PEOPLE: VERY DIFFICULT
SUM OF ALL RESPONSES TO PHQ QUESTIONS 1-9: 8
SUM OF ALL RESPONSES TO PHQ QUESTIONS 1-9: 8

## 2019-10-25 ASSESSMENT — MIFFLIN-ST. JEOR: SCORE: 1549.41

## 2019-10-25 NOTE — PROGRESS NOTES
"Subjective     Mervin Pacheco is a 24 year old female who presents to clinic today for the following health issues:    HPI   Abnormal Mood Symptoms  Onset: x 2 months     Description:   Depression: YES  Anxiety: YES    Accompanying Signs & Symptoms:  Still participating in activities that you used to enjoy: YES  Fatigue: YES  Irritability: YES  Difficulty concentrating: YES  Changes in appetite: no   Problems with sleep: YES  Heart racing/beating fast : YES  Thoughts of hurting yourself or others: none    History:   Recent stress: no   Prior depression hospitalization: None  Family history of depression: YES- dad and sister   Family history of anxiety: YES    Precipitating factors:   Alcohol/drug use: YES- alcohol     Alleviating factors:  seasonal     Therapies Tried and outcome: None      Pt is a therapist. For a couple of months she has been experiencing irritability, worrying and decreased sleep.   No recent stressors.   She typically has most of the summers off.   She takes propranolol, atarax PRN for anxiety. If not working then she takes ativan PRN.   She does see a therapist.   She endorses symptoms of depression. She has decreased interest, lack of energy and decreased sleep.   She has two pills of ativan is left.   No recreational drugs.     Reviewed and updated as needed this visit by Provider         Review of Systems   ROS COMP: Constitutional, HEENT, cardiovascular, pulmonary, gi and gu systems are negative, except as otherwise noted.      Objective    There were no vitals taken for this visit.  There is no height or weight on file to calculate BMI.  Physical Exam   /82 (BP Location: Right arm, Patient Position: Sitting, Cuff Size: Adult Regular)   Pulse 76   Temp 98.5  F (36.9  C) (Oral)   Resp 16   Ht 1.638 m (5' 4.5\")   Wt 81.6 kg (180 lb)   SpO2 97%   BMI 30.42 kg/m    GENERAL: healthy, alert and no distress  EYES: Eyes grossly normal to inspection  HENT: nose and mouth without ulcers " or lesions  PSYCH: anxious appearing     Diagnostic Test Results:  none         Assessment & Plan     1. Anxiety  NURY-7 SCORE 7/18/2018 12/10/2018 10/25/2019   Total Score - - 15 (severe anxiety)   Total Score 7 7 15     PHQ-9 SCORE 7/18/2018 12/10/2018 10/25/2019   PHQ-9 Total Score MyChart - - 8 (Mild depression)   PHQ-9 Total Score 1 10 8   - Worsening symptoms. Pt is interested in daily medication. Discussed different medications and I recommended that Buspar would work better for symptoms. Discussed s/e of medication. I also refilled Ativan. Last script was given 1210/2018. No suspicious activity on [place] . No recreational drug use.   - busPIRone (BUSPAR) 5 MG tablet; Start at 5 mg twice daily for 3 days, then 7.5 mg (1.5 tabs) twice daily for 3 days, then 10 mg (2 tabs) twice daily for 3 days, then 12.5 mg (2.5 tabs) twice daily for 3 days, then 15 mg (3 tabs) twice daily and stay at that dose  Dispense: 150 tablet; Refill: 1  - LORazepam (ATIVAN) 0.5 MG tablet; Take 1 tablet (0.5 mg) by mouth every 8 hours as needed for anxiety  Dispense: 20 tablet; Refill: 0; advised to not drive or take with alcohol.     2. HIV screening declined    Up to date with tetanus and influenza vaccine.     Return in about 4 weeks (around 11/22/2019) for Routine Visit.    Veena Figueroa MD  [facility]      Answers for HPI/ROS submitted by the patient on 10/25/2019   If you checked off any problems, how difficult have these problems made it for you to do your work, take care of things at home, or get along with other people?: Very difficult  PHQ9 TOTAL SCORE: 8  NURY 7 TOTAL SCORE: 15

## 2019-10-25 NOTE — PATIENT INSTRUCTIONS
- busPIRone (BUSPAR) 5 MG tablet; Start at 5 mg twice daily for 3 days, then 7.5 mg (1.5 tabs) twice daily for 3 days, then 10 mg (2 tabs) twice daily for 3 days, then 12.5 mg (2.5 tabs) twice daily for 3 days, then 15 mg (3 tabs) twice daily and stay at that dose    - LORazepam (ATIVAN) 0.5 MG tablet; Take 1 tablet (0.5 mg) by mouth every 8 hours as needed for anxiety

## 2019-10-26 ASSESSMENT — ANXIETY QUESTIONNAIRES: GAD7 TOTAL SCORE: 15

## 2019-12-14 DIAGNOSIS — F41.9 ANXIETY: ICD-10-CM

## 2019-12-14 RX ORDER — BUSPIRONE HYDROCHLORIDE 5 MG/1
TABLET ORAL
Qty: 150 TABLET | Refills: 0 | Status: SHIPPED | OUTPATIENT
Start: 2019-12-14 | End: 2020-01-09

## 2019-12-15 NOTE — TELEPHONE ENCOUNTER
HW Reception Medication is being filled for 1 time refill only due to:  Patient needs to be seen because due for f/u officie visit mental health - after starting medication.     Signed Prescriptions:                        Disp   Refills    busPIRone (BUSPAR) 5 MG tablet             150 ta*0        Sig: TAKE 1 TABLET BY MOUTH TWICE DAILY FOR 3 DAYS,           INCREASE DOSE BY 1/2 TABLET TWICE DAILY EVERY 3           DAYS UNTIL 3 TABLETS BY MOUTH TWICE DAILY.  Authorizing Provider: GLEN TAPIA  Ordering User: NATALIE STONE

## 2019-12-15 NOTE — TELEPHONE ENCOUNTER
"Requested Prescriptions   Pending Prescriptions Disp Refills     busPIRone (BUSPAR) 5 MG tablet [Pharmacy Med Name: BUSPIRONE 5MG TABLETS] 150 tablet 0     Sig: TAKE 1 TABLET BY MOUTH TWICE DAILY FOR 3 DAYS, INCREASE DOSE BY 1/2 TABLET TWICE DAILY EVERY 3 DAYS UNTIL 3 TABLETS BY MOUTH TWICE DAILY.       Atypical Antidepressants Protocol Passed - 12/14/2019  4:00 AM        Passed - Recent (12 mo) or future (30 days) visit within the authorizing provider's specialty     Patient has had an office visit with the authorizing provider or a provider within the authorizing providers department within the previous 12 mos or has a future within next 30 days. See \"Patient Info\" tab in inbasket, or \"Choose Columns\" in Meds & Orders section of the refill encounter.              Passed - Medication active on med list        Passed - Patient is age 18 or older        Passed - No active pregnancy on record        Passed - No positive pregnancy test in past 12 mos        1. Anxiety  NURY-7 SCORE 7/18/2018 12/10/2018 10/25/2019   Total Score - - 15 (severe anxiety)   Total Score 7 7 15      PHQ-9 SCORE 7/18/2018 12/10/2018 10/25/2019   PHQ-9 Total Score MyChart - - 8 (Mild depression)   PHQ-9 Total Score 1 10 8   - Worsening symptoms. Pt is interested in daily medication. Discussed different medications and I recommended that Buspar would work better for symptoms. Discussed s/e of medication. I also refilled Ativan. Last script was given 1210/2018. No suspicious activity on [place] . No recreational drug use.   - busPIRone (BUSPAR) 5 MG tablet; Start at 5 mg twice daily for 3 days, then 7.5 mg (1.5 tabs) twice daily for 3 days, then 10 mg (2 tabs) twice daily for 3 days, then 12.5 mg (2.5 tabs) twice daily for 3 days, then 15 mg (3 tabs) twice daily and stay at that dose  Dispense: 150 tablet; Refill: 1  - LORazepam (ATIVAN) 0.5 MG tablet; Take 1 tablet (0.5 mg) by mouth every 8 hours as needed for anxiety  Dispense: 20 tablet; Refill: " 0; advised to not drive or take with alcohol.      2. HIV screening declined     Up to date with tetanus and influenza vaccine.      Return in about 4 weeks (around 11/22/2019) for Routine Visit.

## 2020-01-14 ENCOUNTER — OFFICE VISIT (OUTPATIENT)
Dept: FAMILY MEDICINE | Facility: CLINIC | Age: 25
End: 2020-01-14
Payer: COMMERCIAL

## 2020-01-14 VITALS
BODY MASS INDEX: 30.66 KG/M2 | HEART RATE: 86 BPM | SYSTOLIC BLOOD PRESSURE: 124 MMHG | OXYGEN SATURATION: 96 % | TEMPERATURE: 99.2 F | WEIGHT: 184 LBS | DIASTOLIC BLOOD PRESSURE: 84 MMHG | HEIGHT: 65 IN

## 2020-01-14 DIAGNOSIS — F41.1 GAD (GENERALIZED ANXIETY DISORDER): Primary | ICD-10-CM

## 2020-01-14 PROCEDURE — 99214 OFFICE O/P EST MOD 30 MIN: CPT | Performed by: FAMILY MEDICINE

## 2020-01-14 RX ORDER — SERTRALINE HYDROCHLORIDE 25 MG/1
TABLET, FILM COATED ORAL
Qty: 74 TABLET | Refills: 0 | Status: SHIPPED | OUTPATIENT
Start: 2020-01-14 | End: 2021-01-11

## 2020-01-14 ASSESSMENT — ANXIETY QUESTIONNAIRES
7. FEELING AFRAID AS IF SOMETHING AWFUL MIGHT HAPPEN: NOT AT ALL
6. BECOMING EASILY ANNOYED OR IRRITABLE: MORE THAN HALF THE DAYS
1. FEELING NERVOUS, ANXIOUS, OR ON EDGE: NEARLY EVERY DAY
2. NOT BEING ABLE TO STOP OR CONTROL WORRYING: NEARLY EVERY DAY
3. WORRYING TOO MUCH ABOUT DIFFERENT THINGS: NEARLY EVERY DAY
5. BEING SO RESTLESS THAT IT IS HARD TO SIT STILL: SEVERAL DAYS
GAD7 TOTAL SCORE: 15
IF YOU CHECKED OFF ANY PROBLEMS ON THIS QUESTIONNAIRE, HOW DIFFICULT HAVE THESE PROBLEMS MADE IT FOR YOU TO DO YOUR WORK, TAKE CARE OF THINGS AT HOME, OR GET ALONG WITH OTHER PEOPLE: VERY DIFFICULT

## 2020-01-14 ASSESSMENT — MIFFLIN-ST. JEOR: SCORE: 1567.56

## 2020-01-14 ASSESSMENT — PATIENT HEALTH QUESTIONNAIRE - PHQ9
SUM OF ALL RESPONSES TO PHQ QUESTIONS 1-9: 8
5. POOR APPETITE OR OVEREATING: NEARLY EVERY DAY

## 2020-01-14 NOTE — PATIENT INSTRUCTIONS
Buspar 10 mg three times daily. Message me on Friday if you still continue to experience symptoms. At that point I'll decrease the Buspar and increase Zoloft.   Zoloft 25 mg daily daily for 2 weeks then increase to 50 mg daily.

## 2020-01-14 NOTE — PROGRESS NOTES
"Subjective     Mervin Pacheco is a 24 year old female who presents to clinic today for the following health issues:    HPI   Anxiety Follow-Up    How are you doing with your anxiety since your last visit? No change    Are you having other symptoms that might be associated with anxiety? Yes:  over thinking    Have you had a significant life event? OTHER: bought a house      Are you feeling depressed? Yes:      Do you have any concerns with your use of alcohol or other drugs? No    Social History     Tobacco Use     Smoking status: Never Smoker     Smokeless tobacco: Never Used   Substance Use Topics     Alcohol use: Yes     Comment: less than 1x a week     Drug use: No     NURY-7 SCORE 7/18/2018 12/10/2018 10/25/2019   Total Score - - 15 (severe anxiety)   Total Score 7 7 15   Some encounter information is confidential and restricted. Go to Review Flowsheets activity to see all data.     PHQ 7/18/2018 12/10/2018 10/25/2019   PHQ-9 Total Score 1 10 8   Q9: Thoughts of better off dead/self-harm past 2 weeks Not at all Not at all Not at all   Some encounter information is confidential and restricted. Go to Review Flowsheets activity to see all data.       Notes dizziness after 30 minutes of taking Buspar and lasts for 30 minutes. No difference with taking food.   She felt that Buspar was initially helping but no longer effective.   No previous serotonin specific reuptake inhibitor use.         Reviewed and updated as needed this visit by Provider         Review of Systems   ROS COMP: Constitutional, HEENT, cardiovascular, pulmonary, gi and gu systems are negative, except as otherwise noted.      Objective    There were no vitals taken for this visit.  There is no height or weight on file to calculate BMI.  Physical Exam   /84   Pulse 86   Temp 99.2  F (37.3  C) (Oral)   Ht 1.638 m (5' 4.5\")   Wt 83.5 kg (184 lb)   LMP 01/07/2020   SpO2 96%   BMI 31.10 kg/m    GENERAL: healthy, alert and no distress  EYES: " Eyes grossly normal to inspection  HENT:  nose and mouth without ulcers or lesions  PSYCH: mentation appears normal, affect normal    Diagnostic Test Results:  none         Assessment & Plan     1. NURY (generalized anxiety disorder)  - not controlled  - advised below  Buspar 10 mg three times daily. Message me on Friday if you still continue to experience symptoms. At that point I'll decrease the Buspar and increase Zoloft.   Zoloft 25 mg daily daily for 2 weeks then increase to 50 mg daily.   - sertraline (ZOLOFT) 25 MG tablet; Take 1 tablet (25 mg) by mouth daily for 14 days, THEN 2 tablets (50 mg) daily.  Dispense: 74 tablet; Refill: 0    Return in about 4 weeks (around 2/11/2020) for Routine Visit, e-visit.    Veena Figueroa MD  [facility] [place]

## 2020-01-15 ENCOUNTER — TELEPHONE (OUTPATIENT)
Dept: FAMILY MEDICINE | Facility: CLINIC | Age: 25
End: 2020-01-15

## 2020-01-15 ASSESSMENT — ANXIETY QUESTIONNAIRES: GAD7 TOTAL SCORE: 15

## 2020-01-15 NOTE — TELEPHONE ENCOUNTER
Prior Authorization Retail Medication Request    Medication/Dose: sertraline (ZOLOFT) 25 MG tablet  ICD code (if different than what is on RX):  Previously Tried and Failed:  Rationale:    Insurance Name: unknown  Insurance ID: [id number]    Pharmacy Information (if different than what is on RX)  [facility]  Phone: [phone]    Please include previous medications tried and failed.  Please ask insurance for medications on formulary.

## 2020-01-17 NOTE — TELEPHONE ENCOUNTER
Patient's plan only allows 1 tablet per day. Can she take 50mg tablet after 14 days on the 25mg tablet? Please send new Rx to the pharmacy for 50mg tablet or route back to me with clinical reasoning as to why she cannot take this. Thanks!

## 2020-02-07 ENCOUNTER — E-VISIT (OUTPATIENT)
Dept: FAMILY MEDICINE | Facility: CLINIC | Age: 25
End: 2020-02-07
Payer: COMMERCIAL

## 2020-02-07 DIAGNOSIS — F41.1 GAD (GENERALIZED ANXIETY DISORDER): ICD-10-CM

## 2020-02-07 PROCEDURE — 99207 ZZC NO BILLABLE SERVICE THIS VISIT: CPT | Performed by: FAMILY MEDICINE

## 2020-02-07 ASSESSMENT — ANXIETY QUESTIONNAIRES
1. FEELING NERVOUS, ANXIOUS, OR ON EDGE: NOT AT ALL
2. NOT BEING ABLE TO STOP OR CONTROL WORRYING: NOT AT ALL
4. TROUBLE RELAXING: NOT AT ALL
GAD7 TOTAL SCORE: 0
7. FEELING AFRAID AS IF SOMETHING AWFUL MIGHT HAPPEN: NOT AT ALL
6. BECOMING EASILY ANNOYED OR IRRITABLE: NOT AT ALL
3. WORRYING TOO MUCH ABOUT DIFFERENT THINGS: NOT AT ALL
5. BEING SO RESTLESS THAT IT IS HARD TO SIT STILL: NOT AT ALL
GAD7 TOTAL SCORE: 0
7. FEELING AFRAID AS IF SOMETHING AWFUL MIGHT HAPPEN: NOT AT ALL

## 2020-02-08 RX ORDER — BUSPIRONE HYDROCHLORIDE 10 MG/1
10 TABLET ORAL 3 TIMES DAILY
Qty: 270 TABLET | Refills: 1 | Status: SHIPPED | OUTPATIENT
Start: 2020-02-08 | End: 2020-08-25

## 2020-02-08 ASSESSMENT — ANXIETY QUESTIONNAIRES: GAD7 TOTAL SCORE: 0

## 2020-02-18 DIAGNOSIS — F41.1 GAD (GENERALIZED ANXIETY DISORDER): ICD-10-CM

## 2020-02-18 NOTE — TELEPHONE ENCOUNTER
"Requested Prescriptions   Pending Prescriptions Disp Refills     sertraline (ZOLOFT) 50 MG tablet [Pharmacy Med Name: SERTRALINE 50MG TABLETS] 30 tablet      Sig: TAKE 1 TABLET(50 MG) BY MOUTH DAILY  Last Written Prescription Date:  2/8/2020  Last Fill Quantity: 90 tablet,  # refills: 1   Last Office Visit: 1/14/2020   Future Office Visit:            SSRIs Protocol Passed - 2/18/2020  3:39 AM        Passed - Recent (12 mo) or future (30 days) visit within the authorizing provider's specialty     Patient has had an office visit with the authorizing provider or a provider within the authorizing providers department within the previous 12 mos or has a future within next 30 days. See \"Patient Info\" tab in inbasket, or \"Choose Columns\" in Meds & Orders section of the refill encounter.            Passed - Medication is active on med list        Passed - Patient is age 18 or older        Passed - No active pregnancy on record        Passed - No positive pregnancy test in last 12 months          "

## 2020-03-10 ENCOUNTER — HEALTH MAINTENANCE LETTER (OUTPATIENT)
Age: 25
End: 2020-03-10

## 2020-04-02 DIAGNOSIS — F41.9 ANXIETY: ICD-10-CM

## 2020-04-02 NOTE — TELEPHONE ENCOUNTER
"Requested Prescriptions   Pending Prescriptions Disp Refills     busPIRone (BUSPAR) 5 MG tablet [Pharmacy Med Name: BUSPIRONE 5MG TABLETS] 168 tablet 0     Sig: TAKE 1 TABLET BY MOUTH TWICE DAILY FOR 3 DAYS. INCREASE DOSE BY 1/2 TABLET TWICE DAILY EVERY 3 DAYS UNTIL 3 TABLETS BY MOUTH 2 TIMES DAILY  Last Written Prescription Date:  1/10/2020  Last Fill Quantity: 168 tablet,  # refills: 0   Last Office Visit: 2/7/2020   Future Office Visit:            Atypical Antidepressants Protocol Passed - 4/2/2020  3:39 AM        Passed - Recent (12 mo) or future (30 days) visit within the authorizing provider's specialty     Patient has had an office visit with the authorizing provider or a provider within the authorizing providers department within the previous 12 mos or has a future within next 30 days. See \"Patient Info\" tab in inbasket, or \"Choose Columns\" in Meds & Orders section of the refill encounter.            Passed - Medication active on med list        Passed - Patient is age 18 or older        Passed - No active pregnancy on record        Passed - No positive pregnancy test in past 12 mos             "

## 2020-04-03 RX ORDER — BUSPIRONE HYDROCHLORIDE 5 MG/1
TABLET ORAL
Qty: 168 TABLET | Refills: 0 | OUTPATIENT
Start: 2020-04-03

## 2020-04-03 NOTE — TELEPHONE ENCOUNTER
Pt is taking buspirone 10mg TID - confirmed with pt. No longer on 5mg tablets. This rx sent in error.    Waleska Singleton, PharmD  Medication Therapy Management Provider, [facility]  Pager: [phone]

## 2020-04-30 ENCOUNTER — MYC MEDICAL ADVICE (OUTPATIENT)
Dept: FAMILY MEDICINE | Facility: CLINIC | Age: 25
End: 2020-04-30

## 2020-07-27 ENCOUNTER — VIRTUAL VISIT (OUTPATIENT)
Dept: FAMILY MEDICINE | Facility: CLINIC | Age: 25
End: 2020-07-27
Payer: COMMERCIAL

## 2020-07-27 ENCOUNTER — E-VISIT (OUTPATIENT)
Dept: FAMILY MEDICINE | Facility: CLINIC | Age: 25
End: 2020-07-27

## 2020-07-27 DIAGNOSIS — G44.219 EPISODIC TENSION-TYPE HEADACHE, NOT INTRACTABLE: Primary | ICD-10-CM

## 2020-07-27 DIAGNOSIS — R69 DIAGNOSIS DEFERRED: Primary | ICD-10-CM

## 2020-07-27 DIAGNOSIS — M54.2 NECK PAIN: ICD-10-CM

## 2020-07-27 PROCEDURE — 99207 ZZC NON-BILLABLE SERV PER CHARTING: CPT | Performed by: FAMILY MEDICINE

## 2020-07-27 PROCEDURE — 99214 OFFICE O/P EST MOD 30 MIN: CPT | Mod: TEL | Performed by: FAMILY MEDICINE

## 2020-07-27 RX ORDER — CYCLOBENZAPRINE HCL 5 MG
5-10 TABLET ORAL 3 TIMES DAILY PRN
Qty: 30 TABLET | Refills: 0 | Status: SHIPPED | OUTPATIENT
Start: 2020-07-27 | End: 2021-01-11

## 2020-07-27 NOTE — TELEPHONE ENCOUNTER
Provider E-Visit time total (minutes): 1    TC - can you please schedule pt for telephone visit.  DM

## 2020-07-27 NOTE — PROGRESS NOTES
"Mervin Pacheco is a 25 year old female who is being evaluated via a billable telephone visit.      The patient has been notified of following:     \"This telephone visit will be conducted via a call between you and your physician/provider. We have found that certain health care needs can be provided without the need for a physical exam.  This service lets us provide the care you need with a short phone conversation.  If a prescription is necessary we can send it directly to your pharmacy.  If lab work is needed we can place an order for that and you can then stop by our lab to have the test done at a later time.    Telephone visits are billed at different rates depending on your insurance coverage. During this emergency period, for some insurers they may be billed the same as an in-person visit.  Please reach out to your insurance provider with any questions.    If during the course of the call the physician/provider feels a telephone visit is not appropriate, you will not be charged for this service.\"    Patient has given verbal consent for Telephone visit?  Yes    What phone number would you like to be contacted at?[phone]     How would you like to obtain your AVS? Cristy Ramsay     Mervin Pacheco is a 25 year old female who presents via phone visit today for the following health issues:    HPI    Headache  Duration of complaint: about 6 months 2-3 times a week      She has had headaches a few times/week over the last month.   It is always on the right side in the occipital region/neck. Headaches usually last 1-2 days.   Headaches are moderate, throbbing, radiating to upper neck, aggravated by noise, bending over or standing up too quickly, mildly relieved with pressure on the neck. Stretching and tylenol do not help.   She feels dizziness when she stands up too quickly and pain gets worse, symptoms then resolve.   No vision changes, nausea, vomiting, fever, chills or paresthesias.  No head trauma. "   No aura.   No change in stress.     Reviewed and updated as needed this visit by Provider         Review of Systems   Constitutional, HEENT, cardiovascular, pulmonary, gi and gu systems are negative, except as otherwise noted.       Objective   Reported vitals:  There were no vitals taken for this visit.   healthy, alert and no distress  PSYCH: Alert and oriented times 3; coherent speech, normal   rate and volume, able to articulate logical thoughts, able   to abstract reason, no tangential thoughts, no hallucinations   or delusions  Her affect is normal  RESP: No cough, no audible wheezing, able to talk in full sentences  Remainder of exam unable to be completed due to telephone visits    Diagnostic Test Results:  none         Assessment/Plan:    Episodic tension-type headache, neck pain  - discussed symptomatic tx  - cyclobenzaprine (FLEXERIL) 5 MG tablet; Take 1-2 tablets (5-10 mg) by mouth 3 times daily as needed for muscle spasms  Dispense: 30 tablet; Refill: 0; advised to not drive or take with alcohol   - Follow if symptoms worsen or fail to improve.      Phone call duration:  7 minutes    Veena Figueroa MD

## 2020-08-24 ENCOUNTER — NURSE TRIAGE (OUTPATIENT)
Dept: NURSING | Facility: CLINIC | Age: 25
End: 2020-08-24

## 2020-08-24 DIAGNOSIS — F41.1 GAD (GENERALIZED ANXIETY DISORDER): ICD-10-CM

## 2020-08-24 RX ORDER — BUSPIRONE HYDROCHLORIDE 10 MG/1
10 TABLET ORAL 3 TIMES DAILY
Qty: 270 TABLET | Refills: 1 | Status: CANCELLED | OUTPATIENT
Start: 2020-08-24

## 2020-08-24 NOTE — TELEPHONE ENCOUNTER
Pt calling to refill buspar, pt stated at her last visit PCP was to send it in. Pharmacy never received.   LOV: 7/27/202pt confirmed she is taking Buspar 10 mg three times daily  Pharmacy confirmed.  Med pended  Sole Celestin RN  [facility]

## 2020-08-25 RX ORDER — BUSPIRONE HYDROCHLORIDE 10 MG/1
10 TABLET ORAL 3 TIMES DAILY
Qty: 270 TABLET | Refills: 1 | Status: SHIPPED | OUTPATIENT
Start: 2020-08-25 | End: 2021-02-15

## 2020-09-24 DIAGNOSIS — Z30.015 ENCOUNTER FOR INITIAL PRESCRIPTION OF VAGINAL RING HORMONAL CONTRACEPTIVE: ICD-10-CM

## 2020-09-24 RX ORDER — ETONOGESTREL AND ETHINYL ESTRADIOL VAGINAL RING .015; .12 MG/D; MG/D
RING VAGINAL
Qty: 3 EACH | Refills: 3 | Status: SHIPPED | OUTPATIENT
Start: 2020-09-24 | End: 2021-01-11

## 2020-10-07 ENCOUNTER — E-VISIT (OUTPATIENT)
Dept: FAMILY MEDICINE | Facility: CLINIC | Age: 25
End: 2020-10-07
Payer: COMMERCIAL

## 2020-10-07 DIAGNOSIS — F41.1 GAD (GENERALIZED ANXIETY DISORDER): Primary | ICD-10-CM

## 2020-10-07 PROCEDURE — 99207 PR NON-BILLABLE SERV PER CHARTING: CPT | Performed by: FAMILY MEDICINE

## 2020-10-07 ASSESSMENT — PATIENT HEALTH QUESTIONNAIRE - PHQ9
10. IF YOU CHECKED OFF ANY PROBLEMS, HOW DIFFICULT HAVE THESE PROBLEMS MADE IT FOR YOU TO DO YOUR WORK, TAKE CARE OF THINGS AT HOME, OR GET ALONG WITH OTHER PEOPLE: SOMEWHAT DIFFICULT
SUM OF ALL RESPONSES TO PHQ QUESTIONS 1-9: 8
SUM OF ALL RESPONSES TO PHQ QUESTIONS 1-9: 8

## 2020-10-07 ASSESSMENT — ANXIETY QUESTIONNAIRES
GAD7 TOTAL SCORE: 5
4. TROUBLE RELAXING: NOT AT ALL
GAD7 TOTAL SCORE: 5
7. FEELING AFRAID AS IF SOMETHING AWFUL MIGHT HAPPEN: NOT AT ALL
2. NOT BEING ABLE TO STOP OR CONTROL WORRYING: SEVERAL DAYS
7. FEELING AFRAID AS IF SOMETHING AWFUL MIGHT HAPPEN: NOT AT ALL
1. FEELING NERVOUS, ANXIOUS, OR ON EDGE: MORE THAN HALF THE DAYS
6. BECOMING EASILY ANNOYED OR IRRITABLE: SEVERAL DAYS
3. WORRYING TOO MUCH ABOUT DIFFERENT THINGS: SEVERAL DAYS
GAD7 TOTAL SCORE: 5
5. BEING SO RESTLESS THAT IT IS HARD TO SIT STILL: NOT AT ALL

## 2020-10-08 ASSESSMENT — PATIENT HEALTH QUESTIONNAIRE - PHQ9: SUM OF ALL RESPONSES TO PHQ QUESTIONS 1-9: 8

## 2020-10-08 ASSESSMENT — ANXIETY QUESTIONNAIRES: GAD7 TOTAL SCORE: 5

## 2020-10-18 ENCOUNTER — AMBULATORY - HEALTHEAST (OUTPATIENT)
Dept: FAMILY MEDICINE | Facility: CLINIC | Age: 25
End: 2020-10-18

## 2020-10-18 ENCOUNTER — VIRTUAL VISIT (OUTPATIENT)
Dept: FAMILY MEDICINE | Facility: OTHER | Age: 25
End: 2020-10-18
Payer: COMMERCIAL

## 2020-10-18 DIAGNOSIS — Z20.822 SUSPECTED COVID-19 VIRUS INFECTION: ICD-10-CM

## 2020-10-18 PROCEDURE — 99421 OL DIG E/M SVC 5-10 MIN: CPT | Performed by: PHYSICIAN ASSISTANT

## 2020-10-18 NOTE — PROGRESS NOTES
"Date: 10/18/2020 11:11:27  Clinician: Francesco Meier  Clinician NPI: [id number]  Patient: Mervin Herrera  Patient : 1995  Patient Address: [address] [place], [place] [address]  Patient Phone: [phone]  Visit Protocol: URI  Patient Summary:  Mervin is a 25 year old ( : 1995 ) female who initiated a [facility] Visit for COVID-19 (Coronavirus) evaluation and screening. When asked the question \"Please sign me up to receive news, health information and promotions from [facility].\", Mervin responded \"No\".   A synchronous visit is necessary because the patient reported the following abnormal symptoms:   Bloody mucus (requires clarification)   Mervin states her symptoms started 1-2 days ago.   Her symptoms consist of a headache, a cough, nasal congestion, rhinitis, malaise, and a sore throat. She is experiencing difficulty breathing due to nasal congestion but she is not short of breath. Mervin also feels feverish but was unable to measure her temperature.   Symptom details     Nasal secretions: The color of her mucus is blood-tinged, bloody, clear, and yellow.    Cough: Mervin coughs a few times an hour and her cough is more bothersome at night. Phlegm does not come into her throat when she coughs. She does not believe her cough is caused by post-nasal drip.     Sore throat: Mervin reports having mild throat pain (1-3 on a 10 point pain scale), does not have exudate on her tonsils, and can swallow liquids. The lymph nodes in her neck are not enlarged. A rash has appeared on the skin since the sore throat started. Mervin uploaded photos of her rash (see below).     Headache: She states the headache is mild (1-3 on a 10 point pain scale).      Mervin denies having ear pain, wheezing, enlarged lymph nodes, anosmia, vomiting, nausea, facial pain or pressure, myalgias, chills, teeth pain, ageusia, and diarrhea. She also denies having a sinus infection within the past year, taking antibiotic medication in the " past month, and having recent facial or sinus surgery in the past 60 days.   Precipitating events  Within the past week, Mervin has not been exposed to someone with strep throat. She has not recently been exposed to someone with influenza. Mervin has been in close contact with the following high risk individuals: adults 65 or older and children under the age of 5.   Pertinent COVID-19 (Coronavirus) information  In the past 14 days, Mervin has not worked in a congregate living setting.   She either works or volunteers as a healthcare worker or a , or works or volunteers in a healthcare facility. 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 She lives with a healthcare worker.   Mervin has had a close contact with a laboratory-confirmed COVID-19 patient within 14 days of symptom onset. She was not exposed at her work. Additional information about contact with COVID-19 (Coronavirus) patient as reported by the patient (free text): a guest at a small wedding in [facility] -she tested positive oct. 5 and was not wearing a mask indoors. [employer] restaurant in [facility]   Since December 2019, Mervin and has not had upper respiratory infection or influenza-like illness. Has not been diagnosed with lab-confirmed COVID-19 test   Pertinent medical history  Mervin does not get yeast infections when she takes antibiotics.   Mervin needs a return to work/school note.   Weight: 180 lbs   Mervin does not smoke or use smokeless tobacco.   She denies pregnancy and denies breastfeeding. She has menstruated in the past month.   Weight: 180 lbs    MEDICATIONS: Prilosec oral, buspirone oral, Zoloft oral, ALLERGIES: NKDA  Clinician Response:  Dear Mervin,   Your symptoms show that you may have coronavirus (COVID-19). This illness  "can cause fever, cough and trouble breathing. Many people get a mild case and get better on their own. Some people can get very sick.  What should I do?  We would like to test you for this virus.   1. Please call [phone] to schedule your visit. Explain that you were [facility] to have a COVID-19 test. Be ready to share your [facility] visit ID number.  The following will serve as your written order for this COVID Test, ordered by me, for the indication of suspected COVID [Z20.828]: The test will be ordered in [facility], our electronic health record, after you are scheduled. It will show as ordered and authorized by Carlos Lopez MD.  Order: COVID-19 (Coronavirus) PCR for SYMPTOMATIC testing from [facility].      2. When it's time for your COVID test:  Stay at least 6 feet away from others. (If someone will drive you to your test, stay in the backseat, as far away from the  as you can.)   Cover your mouth and nose with a mask, tissue or washcloth.  Go straight to the testing site. Don't make any stops on the way there or back.      3.Starting now: Stay home and away from others (self-isolate) until:   You've had no fever---and no medicine that reduces fever---for one full day (24 hours). And...   Your other symptoms have gotten better. For example, your cough or breathing has improved. And...   At least 10 days have passed since your symptoms started.       During this time, don't leave the house except for testing or medical care.   Stay in your own room, even for meals. Use your own bathroom if you can.   Stay away from others in your home. No hugging, kissing or shaking hands. No visitors.  Don't go to work, school or anywhere else.    Clean \"high touch\" surfaces often (doorknobs, counters, handles, etc.). Use a household cleaning spray or wipes. You'll find a full list of  on the EPA website: www.epa.gov/pesticide-registration/list-n-disinfectants-use-against-sars-cov-2.   Cover your mouth and nose " with a mask, tissue or washcloth to avoid spreading germs.  Wash your hands and face often. Use soap and water.  Caregivers in these groups are at risk for severe illness due to COVID-19:  o People 65 years and older  o People who live in a nursing home or long-term care facility  o People with chronic disease (lung, heart, cancer, diabetes, kidney, liver, immunologic)  o People who have a weakened immune system, including those who:   Are in cancer treatment  Take medicine that weakens the immune system, such as corticosteroids  Had a bone marrow or organ transplant  Have an immune deficiency  Have poorly controlled HIV or AIDS  Are obese (body mass index of 40 or higher)  Smoke regularly   o Caregivers should wear gloves while washing dishes, handling laundry and cleaning bedrooms and bathrooms.  o Use caution when washing and drying laundry: Don't shake dirty laundry, and use the warmest water setting that you can.  o For more tips, go to www.cdc.gov/coronavirus/2019-ncov/downloads/10Things.pdf.    4.Sign up for [employer]. We know it's scary to hear that you might have COVID-19. We want to track your symptoms to make sure you're okay over the next 2 weeks. Please look for an email from [employer]---this is a free, online program that we'll use to keep in touch. To sign up, follow the link in the email. Learn more at [email or web address]  How can I take care of myself?   Get lots of rest. Drink extra fluids (unless a doctor has told you not to).   Take Tylenol (acetaminophen) for fever or pain. If you have liver or kidney problems, ask your family doctor if it's okay to take Tylenol.   Adults can take either:    650 mg (two 325 mg pills) every 4 to 6 hours, or...   1,000 mg (two 500 mg pills) every 8 hours as needed.    Note: Don't take more than 3,000 mg in one day. Acetaminophen is found in many medicines (both prescribed and over-the-counter medicines). Read all labels to be sure you don't  take too much.   For children, check the Tylenol bottle for the right dose. The dose is based on the child's age or weight.    If you have other health problems (like cancer, heart failure, an organ transplant or severe kidney disease): Call your specialty clinic if you don't feel better in the next 2 days.       Know when to call 911. Emergency warning signs include:    Trouble breathing or shortness of breath Pain or pressure in the chest that doesn't go away Feeling confused like you haven't felt before, or not being able to wake up Bluish-colored lips or face.  Where can I get more information?   [facility] -- About COVID-19: [email or web address]   CDC -- What to Do If You're Sick: www.cdc.gov/coronavirus/2019-ncov/about/steps-when-sick.html   [facility] -- Ending Home Isolation: www.cdc.gov/coronavirus/2019-ncov/hcp/disposition-in-home-patients.html   [facility] -- Caring for Someone: www.cdc.gov/coronavirus/2019-ncov/if-you-are-sick/care-for-someone.html   [facility] -- Interim Guidance for Hospital Discharge to Home: www.[facility].mn.us/diseases/coronavirus/hcp/hospdischarge.pdf   [facility] clinical trials (COVID-19 research studies): [email or web address].[email or web address]/n-clinical-trials    Below are the COVID-19 hotlines at the [place] Department of Health ([facility]). Interpreters are available.    For health questions: Call 235-959-2457 or 1-998.737.3530 (7 a.m. to 7 p.m.) For questions about schools and childcare: Call 705-890-3838 or 1-730.179.3442 (7 a.m. to 7 p.m.)    Diagnosis: Contact with and (suspected) exposure to other viral communicable diseases  Diagnosis ICD: Z20.828  Additional Clinician Notes:   If your symptoms are not improving or worsen, please go to one of our urgent care locations for evaluations and possible lab work.   Synchronous Triage: phone, status: completed, duration: 127 seconds

## 2020-10-19 ENCOUNTER — AMBULATORY - HEALTHEAST (OUTPATIENT)
Dept: FAMILY MEDICINE | Facility: CLINIC | Age: 25
End: 2020-10-19

## 2020-10-19 DIAGNOSIS — Z20.822 SUSPECTED COVID-19 VIRUS INFECTION: ICD-10-CM

## 2020-10-21 ENCOUNTER — COMMUNICATION - HEALTHEAST (OUTPATIENT)
Dept: SCHEDULING | Facility: CLINIC | Age: 25
End: 2020-10-21

## 2020-12-27 ENCOUNTER — HEALTH MAINTENANCE LETTER (OUTPATIENT)
Age: 25
End: 2020-12-27

## 2021-01-11 ENCOUNTER — OFFICE VISIT (OUTPATIENT)
Dept: OBGYN | Facility: CLINIC | Age: 26
End: 2021-01-11
Payer: COMMERCIAL

## 2021-01-11 VITALS
HEART RATE: 76 BPM | HEIGHT: 65 IN | BODY MASS INDEX: 34.99 KG/M2 | SYSTOLIC BLOOD PRESSURE: 110 MMHG | OXYGEN SATURATION: 96 % | DIASTOLIC BLOOD PRESSURE: 68 MMHG | TEMPERATURE: 98.5 F | WEIGHT: 210 LBS

## 2021-01-11 DIAGNOSIS — Z13.220 SCREENING FOR LIPID DISORDERS: ICD-10-CM

## 2021-01-11 DIAGNOSIS — N92.6 MISSED PERIOD: ICD-10-CM

## 2021-01-11 DIAGNOSIS — E66.01 MORBID OBESITY (H): ICD-10-CM

## 2021-01-11 DIAGNOSIS — Z23 NEED FOR PROPHYLACTIC VACCINATION AND INOCULATION AGAINST INFLUENZA: ICD-10-CM

## 2021-01-11 DIAGNOSIS — Z01.419 ENCOUNTER FOR GYNECOLOGICAL EXAMINATION WITHOUT ABNORMAL FINDING: Primary | ICD-10-CM

## 2021-01-11 DIAGNOSIS — Z13.1 SCREENING FOR DIABETES MELLITUS: ICD-10-CM

## 2021-01-11 DIAGNOSIS — Z12.4 PAP SMEAR FOR CERVICAL CANCER SCREENING: ICD-10-CM

## 2021-01-11 DIAGNOSIS — Z13.29 SCREENING FOR THYROID DISORDER: ICD-10-CM

## 2021-01-11 LAB
ALBUMIN SERPL-MCNC: 3.7 G/DL (ref 3.4–5)
ALP SERPL-CCNC: 105 U/L (ref 40–150)
ALT SERPL W P-5'-P-CCNC: 26 U/L (ref 0–50)
ANION GAP SERPL CALCULATED.3IONS-SCNC: 4 MMOL/L (ref 3–14)
AST SERPL W P-5'-P-CCNC: 17 U/L (ref 0–45)
BILIRUB SERPL-MCNC: 0.4 MG/DL (ref 0.2–1.3)
BUN SERPL-MCNC: 12 MG/DL (ref 7–30)
CALCIUM SERPL-MCNC: 9 MG/DL (ref 8.5–10.1)
CHLORIDE SERPL-SCNC: 108 MMOL/L (ref 94–109)
CHOLEST SERPL-MCNC: 185 MG/DL
CO2 SERPL-SCNC: 29 MMOL/L (ref 20–32)
CREAT SERPL-MCNC: 0.8 MG/DL (ref 0.52–1.04)
ERYTHROCYTE [DISTWIDTH] IN BLOOD BY AUTOMATED COUNT: 13.8 % (ref 10–15)
GFR SERPL CREATININE-BSD FRML MDRD: >90 ML/MIN/{1.73_M2}
GLUCOSE SERPL-MCNC: 98 MG/DL (ref 70–99)
HBA1C MFR BLD: 5.2 % (ref 0–5.6)
HCG UR QL: NEGATIVE
HCT VFR BLD AUTO: 42.4 % (ref 35–47)
HCV AB SERPL QL IA: NONREACTIVE
HDLC SERPL-MCNC: 61 MG/DL
HGB BLD-MCNC: 14.3 G/DL (ref 11.7–15.7)
HIV 1+2 AB+HIV1 P24 AG SERPL QL IA: NONREACTIVE
LDLC SERPL CALC-MCNC: 109 MG/DL
MCH RBC QN AUTO: 27.9 PG (ref 26.5–33)
MCHC RBC AUTO-ENTMCNC: 33.7 G/DL (ref 31.5–36.5)
MCV RBC AUTO: 83 FL (ref 78–100)
NONHDLC SERPL-MCNC: 124 MG/DL
PLATELET # BLD AUTO: 322 10E9/L (ref 150–450)
POTASSIUM SERPL-SCNC: 4.3 MMOL/L (ref 3.4–5.3)
PROLACTIN SERPL-MCNC: 16 UG/L (ref 3–27)
PROT SERPL-MCNC: 7.5 G/DL (ref 6.8–8.8)
RBC # BLD AUTO: 5.13 10E12/L (ref 3.8–5.2)
SODIUM SERPL-SCNC: 141 MMOL/L (ref 133–144)
TRIGL SERPL-MCNC: 76 MG/DL
TSH SERPL DL<=0.005 MIU/L-ACNC: 1.98 MU/L (ref 0.4–4)
WBC # BLD AUTO: 8.2 10E9/L (ref 4–11)

## 2021-01-11 PROCEDURE — 86803 HEPATITIS C AB TEST: CPT | Performed by: OBSTETRICS & GYNECOLOGY

## 2021-01-11 PROCEDURE — 81025 URINE PREGNANCY TEST: CPT | Performed by: OBSTETRICS & GYNECOLOGY

## 2021-01-11 PROCEDURE — 90686 IIV4 VACC NO PRSV 0.5 ML IM: CPT | Performed by: OBSTETRICS & GYNECOLOGY

## 2021-01-11 PROCEDURE — 84144 ASSAY OF PROGESTERONE: CPT | Performed by: OBSTETRICS & GYNECOLOGY

## 2021-01-11 PROCEDURE — 83036 HEMOGLOBIN GLYCOSYLATED A1C: CPT | Performed by: OBSTETRICS & GYNECOLOGY

## 2021-01-11 PROCEDURE — 87389 HIV-1 AG W/HIV-1&-2 AB AG IA: CPT | Performed by: OBSTETRICS & GYNECOLOGY

## 2021-01-11 PROCEDURE — 85027 COMPLETE CBC AUTOMATED: CPT | Performed by: OBSTETRICS & GYNECOLOGY

## 2021-01-11 PROCEDURE — 80053 COMPREHEN METABOLIC PANEL: CPT | Performed by: OBSTETRICS & GYNECOLOGY

## 2021-01-11 PROCEDURE — 80061 LIPID PANEL: CPT | Performed by: OBSTETRICS & GYNECOLOGY

## 2021-01-11 PROCEDURE — 84146 ASSAY OF PROLACTIN: CPT | Performed by: OBSTETRICS & GYNECOLOGY

## 2021-01-11 PROCEDURE — 36415 COLL VENOUS BLD VENIPUNCTURE: CPT | Performed by: OBSTETRICS & GYNECOLOGY

## 2021-01-11 PROCEDURE — G0145 SCR C/V CYTO,THINLAYER,RESCR: HCPCS | Performed by: OBSTETRICS & GYNECOLOGY

## 2021-01-11 PROCEDURE — 99213 OFFICE O/P EST LOW 20 MIN: CPT | Mod: 25 | Performed by: OBSTETRICS & GYNECOLOGY

## 2021-01-11 PROCEDURE — 99385 PREV VISIT NEW AGE 18-39: CPT | Mod: 25 | Performed by: OBSTETRICS & GYNECOLOGY

## 2021-01-11 PROCEDURE — 90471 IMMUNIZATION ADMIN: CPT | Performed by: OBSTETRICS & GYNECOLOGY

## 2021-01-11 PROCEDURE — 84443 ASSAY THYROID STIM HORMONE: CPT | Performed by: OBSTETRICS & GYNECOLOGY

## 2021-01-11 SDOH — HEALTH STABILITY: MENTAL HEALTH: HOW OFTEN DO YOU HAVE 6 OR MORE DRINKS ON ONE OCCASION?: NEVER

## 2021-01-11 SDOH — HEALTH STABILITY: MENTAL HEALTH: HOW MANY STANDARD DRINKS CONTAINING ALCOHOL DO YOU HAVE ON A TYPICAL DAY?: 1 OR 2

## 2021-01-11 SDOH — HEALTH STABILITY: MENTAL HEALTH: HOW OFTEN DO YOU HAVE A DRINK CONTAINING ALCOHOL?: MONTHLY OR LESS

## 2021-01-11 ASSESSMENT — MIFFLIN-ST. JEOR: SCORE: 1680.49

## 2021-01-11 NOTE — PROGRESS NOTES
Mervin is a 25 year old  female who presents for annual exam.     Menses are regular q 28-30 days and normal lasting 5 days.  Menses flow: normal.  Patient's last menstrual period was 2020.. Using none for contraception.  She is /isnot currently considering pregnancy.  Besides routine health maintenance,  she would like to discuss no period since 18.  Was on the NuvaRing and stopped this in July or August.  Had normal menses in September, October and then  but no menses since then.  Has done pregnancy tests at home that are negative.  Cycles were regular prior to starting the NuvaRing.  She is a therapist at [employer].  She and spouse are okay if she were to get pregnant but he is on antidepressants so sometimes they cannot have intercourse.  She is taking a multivitamin.  Has history of eating disorder and has not been weighing herself.  Surprised at her weight today.  Goes to [employer] 5 days a week.  GYNECOLOGIC HISTORY:  Menarche:   Mervin is sexually active with 1male partner(s) and is currently in monogamous relationship.    History sexually transmitted infections:No STD history  STI testing offered?  Declined  YELENA exposure: Unknown  History of abnormal Pap smear: NO - age 21-29 PAP every 3 years recommended  Family history of breast CA: Yes (Please explain): m-aunt  Family history of uterine/ovarian CA:     Family history of colon CA: No    HEALTH MAINTENANCE:  Cholesterol: (  Cholesterol   Date Value Ref Range Status   2016 142 <200 mg/dL Final    History of abnormal lipids: No  Mammo: na . History of abnormal Mammo: Not applicable.  Regular Self Breast Exams: Yes  Calcium/Vitamin D intake: source:  dairy, vit d Adequate? Yes  TSH: (No results found for: TSH )  Pap; (  Lab Results   Component Value Date    PAP NIL 2018    PAP Negative 2015    )    HISTORY:  OB History    Para Term  AB Living   0 0 0 0 0 0   SAB TAB Ectopic Multiple Live  Births   0 0 0 0 0     Past Medical History:   Diagnosis Date     Anxiety Age 22     Esophageal reflux Age 8    Didnt notice as i aged until 23     Gastroparesis      Genital problems Age 22    Genito pelvic pain     GERD (gastroesophageal reflux disease)     esophageal polyp     Past Surgical History:   Procedure Laterality Date     APPENDECTOMY      4/2002     ENT SURGERY  2011    tonsils     Family History   Problem Relation Age of Onset     Obesity Mother      Velasquez's esophagus Mother      Other - See Comments Mother         eosinophilic esophagitis     Hypertension Father      Hyperlipidemia Father      Diabetes Father      Anxiety Disorder Father      Stomach Cancer Maternal Grandmother      Breast Cancer Maternal Aunt      Diabetes Paternal Grandfather      Diabetes Paternal Grandmother      Other Cancer Paternal Grandmother         Stomach cancer     Anxiety Disorder Sister      Social History     Socioeconomic History     Marital status:      Spouse name: Not on file     Number of children: Not on file     Years of education: Not on file     Highest education level: Not on file   Occupational History     Not on file   Social Needs     Financial resource strain: Not on file     Food insecurity     Worry: Not on file     Inability: Not on file     Transportation needs     Medical: Not on file     Non-medical: Not on file   Tobacco Use     Smoking status: Never Smoker     Smokeless tobacco: Never Used   Substance and Sexual Activity     Alcohol use: Yes     Comment: less than 1x a week     Drug use: No     Sexual activity: Yes     Partners: Male     Birth control/protection: Inserts/Ring   Lifestyle     Physical activity     Days per week: Not on file     Minutes per session: Not on file     Stress: Not on file   Relationships     Social connections     Talks on phone: Not on file     Gets together: Not on file     Attends [employer] service: Not on file     Active member of club or organization: Not  "on file     Attends meetings of clubs or organizations: Not on file     Relationship status: Not on file     Intimate partner violence     Fear of current or ex partner: Not on file     Emotionally abused: Not on file     Physically abused: Not on file     Forced sexual activity: Not on file   Other Topics Concern     Parent/sibling w/ CABG, MI or angioplasty before 65F 55M? Not Asked   Social History Narrative     Not on file       Current Outpatient Medications:      busPIRone (BUSPAR) 10 MG tablet, Take 1 tablet (10 mg) by mouth 3 times daily, Disp: 270 tablet, Rfl: 1     omeprazole (PRILOSEC) 20 MG DR capsule, , Disp: , Rfl:      sertraline (ZOLOFT) 50 MG tablet, TAKE 1 TABLET(50 MG) BY MOUTH DAILY, Disp: 90 tablet, Rfl: 1     ondansetron (ZOFRAN-ODT) 4 MG ODT tab, Take 8 mg by mouth every 8 hours as needed for nausea, Disp: , Rfl:   No current facility-administered medications for this visit.     Facility-Administered Medications Ordered in Other Visits:      barium sulfate 40% (VARIBAR THIN HONEY) oral suspension 20 mL, 20 mL, Oral, Once, Sriram Littlejohn MD   No Known Allergies    Past medical, surgical, social and family history were reviewed and updated in EPIC.    EXAM:  /68   Pulse 76   Temp 98.5  F (36.9  C) (Oral)   Ht 1.638 m (5' 4.5\")   Wt 95.3 kg (210 lb)   LMP 11/05/2020   SpO2 96%   BMI 35.49 kg/m     BMI: Body mass index is 35.49 kg/m .  Constitutional: healthy, alert and no distress  Head: Normocephalic. No masses, lesions, tenderness or abnormalities  Neck: Neck supple. Trachea midline. No adenopathy. Thyroid symmetric, normal size.   Cardiovascular: RRR.   Respiratory: Negative.   Breast: Breasts reveal mild symmetric fibrocystic densities, but there are no dominant, discrete, fixed or suspicious masses found.  Gastrointestinal: Abdomen soft, non-tender, non-distended. No masses, organomegaly.  :  Vulva:  No external lesions, normal female hair distribution, no inguinal " adenopathy.    Urethra:  Midline, non-tender, well supported, no discharge  Vagina:  Moist, pink, no abnormal discharge, no lesions  Uterus:  Normal size , non-tender, freely mobile  Ovaries:  No masses appreciated, non-tender, mobile  Rectal Exam: deferred  Musculoskeletal: extremities normal  Skin: no suspicious lesions or rashes  Psychiatric: Affect appropriate, cooperative,mentation appears normal.     COUNSELING:   Reviewed preventive health counseling, as reflected in patient instructions       Healthy diet/nutrition       Family planning       Consider Hep C screening for all patients one time for ages 18-79 years   reports that she has never smoked. She has never used smokeless tobacco.    Body mass index is 35.49 kg/m .  Weight management plan: Discussed healthy diet and exercise guidelines  FRAX Risk Assessment    ASSESSMENT:  25 year old female with satisfactory annual exam  (Z01.419) Encounter for gynecological examination without abnormal finding  (primary encounter diagnosis)  Comment: no birthcontrol  Plan: CBC with platelets, Hepatitis C antibody, HIV         Antigen Antibody Combo        Check labs recommended by [facility] today    (N92.6) Missed period  Comment: No menses since November  Plan: HCG qualitative urine, Progesterone, Prolactin        Check labs today and discussed may need Provera withdrawal.  Reviewed obesity also can be associated with anovulation and that regular cycles are 25 to 35 days.    (Z23) Need for prophylactic vaccination and inoculation against influenza  Plan: INFLUENZA VACCINE IM > 6 MONTHS VALENT IIV4         [24691], INITIAL VACCINE ADMINSTRATION    (Z13.220) Screening for lipid disorders  Comment: Fasting  Plan: Lipid Profile        Check lab today    (Z13.1) Screening for diabetes mellitus  Comment: Fasting  Plan: Comprehensive metabolic panel, Hemoglobin A1c        Check labs today    (Z13.29) Screening for thyroid disorder  Comment: Family history  Plan: TSH with free T4  reflex        Check labs today    (Z12.4) Pap smear for cervical cancer screening  Comment: Normal 3 years ago  Plan: Pap imaged thin layer screen reflex to HPV if         ASCUS - recommend age 25 - 29        Discussed every 3-year Pap smears    (E66.01) Morbid obesity (H)  Comment: BMI 35  Plan: Discussed could be affecting her menses and would be ideally a lower weight prior to pregnancy.  Due to her history of eating disorder, not a lot was discussed on this topic.    Emili Lua MD

## 2021-01-11 NOTE — NURSING NOTE
Clinic Administered Medication Documentation      Injectable Medication Documentation    Patient was given flu vac. Prior to medication administration, verified patients identity using patient s name and date of birth. Please see MAR and medication order for additional information. Patient instructed to remain in clinic for 15 minutes.      Was entire vial of medication used? Yes  Vial/Syringe: Single dose vial  Expiration Date:  6/30/21  Was this medication supplied by the patient? No

## 2021-01-12 LAB — PROGEST SERPL-MCNC: 5.1 NG/ML

## 2021-01-13 LAB
COPATH REPORT: NORMAL
PAP: NORMAL

## 2021-01-24 ENCOUNTER — MYC MEDICAL ADVICE (OUTPATIENT)
Dept: OBGYN | Facility: CLINIC | Age: 26
End: 2021-01-24

## 2021-01-26 ENCOUNTER — AMBULATORY - HEALTHEAST (OUTPATIENT)
Dept: NURSING | Facility: CLINIC | Age: 26
End: 2021-01-26

## 2021-02-03 DIAGNOSIS — F41.1 GAD (GENERALIZED ANXIETY DISORDER): ICD-10-CM

## 2021-02-04 RX ORDER — LORAZEPAM 0.5 MG/1
TABLET ORAL
Qty: 20 TABLET | OUTPATIENT
Start: 2021-02-04

## 2021-02-04 NOTE — TELEPHONE ENCOUNTER
Disp Refills Start End LUKAS   LORazepam (ATIVAN) 0.5 MG tablet (Discontinued) 20 tablet 0 10/25/2019 1/11/2021 No   Sig - Route: Take 1 tablet (0.5 mg) by mouth every 8 hours as needed for anxiety - Oral   Patient not taking: Reported on 7/27/2020        Class: Local Print

## 2021-02-15 DIAGNOSIS — F41.1 GAD (GENERALIZED ANXIETY DISORDER): ICD-10-CM

## 2021-02-15 RX ORDER — BUSPIRONE HYDROCHLORIDE 10 MG/1
10 TABLET ORAL 3 TIMES DAILY
Qty: 270 TABLET | Refills: 1 | Status: SHIPPED | OUTPATIENT
Start: 2021-02-15 | End: 2021-08-20

## 2021-02-16 ENCOUNTER — AMBULATORY - HEALTHEAST (OUTPATIENT)
Dept: NURSING | Facility: CLINIC | Age: 26
End: 2021-02-16

## 2021-04-12 ENCOUNTER — MYC MEDICAL ADVICE (OUTPATIENT)
Dept: FAMILY MEDICINE | Facility: CLINIC | Age: 26
End: 2021-04-12

## 2021-04-12 NOTE — TELEPHONE ENCOUNTER
Writer responded via [facility] [employer].    CHERYLE CheneyN, RN  [facility]

## 2021-06-10 ENCOUNTER — TELEPHONE (OUTPATIENT)
Dept: FAMILY MEDICINE | Facility: CLINIC | Age: 26
End: 2021-06-10

## 2021-06-10 NOTE — TELEPHONE ENCOUNTER
See 4/30/21 MyChart encounter.    Patient reports taking 100 mg daily.  Writer called [facility] and informed Pharmacist.    Per Pharmacist, patient was dispensed 100 mg tablets, likely because insurance will not cover two, 50 mg tablets per day.    CHERYLE CheneyN, RN  [facility]

## 2021-07-21 DIAGNOSIS — F41.1 GAD (GENERALIZED ANXIETY DISORDER): ICD-10-CM

## 2021-07-23 RX ORDER — SERTRALINE HYDROCHLORIDE 100 MG/1
100 TABLET, FILM COATED ORAL DAILY
Qty: 30 TABLET | Refills: 0 | Status: SHIPPED | OUTPATIENT
Start: 2021-07-23 | End: 2021-08-20

## 2021-07-23 NOTE — TELEPHONE ENCOUNTER
Routing refill request to provider for review/approval because:  Last Rx says 1 tab daily and 2 tabs daily (2 directions listed)  Pharmacy requesting 1.5 daily  Please advise  Sharmaine GARCIA RN

## 2021-08-20 ENCOUNTER — OFFICE VISIT (OUTPATIENT)
Dept: FAMILY MEDICINE | Facility: CLINIC | Age: 26
End: 2021-08-20
Payer: COMMERCIAL

## 2021-08-20 VITALS
SYSTOLIC BLOOD PRESSURE: 107 MMHG | DIASTOLIC BLOOD PRESSURE: 66 MMHG | TEMPERATURE: 98.5 F | BODY MASS INDEX: 33.82 KG/M2 | HEART RATE: 64 BPM | WEIGHT: 203 LBS | OXYGEN SATURATION: 97 % | HEIGHT: 65 IN

## 2021-08-20 DIAGNOSIS — K21.00 GASTROESOPHAGEAL REFLUX DISEASE WITH ESOPHAGITIS WITHOUT HEMORRHAGE: ICD-10-CM

## 2021-08-20 DIAGNOSIS — F41.1 GAD (GENERALIZED ANXIETY DISORDER): ICD-10-CM

## 2021-08-20 DIAGNOSIS — Z00.00 ROUTINE GENERAL MEDICAL EXAMINATION AT A HEALTH CARE FACILITY: Primary | ICD-10-CM

## 2021-08-20 PROCEDURE — 96127 BRIEF EMOTIONAL/BEHAV ASSMT: CPT | Performed by: FAMILY MEDICINE

## 2021-08-20 PROCEDURE — 99395 PREV VISIT EST AGE 18-39: CPT | Performed by: FAMILY MEDICINE

## 2021-08-20 PROCEDURE — 99214 OFFICE O/P EST MOD 30 MIN: CPT | Mod: 25 | Performed by: FAMILY MEDICINE

## 2021-08-20 RX ORDER — FAMOTIDINE 40 MG/1
40 TABLET, FILM COATED ORAL DAILY
Qty: 90 TABLET | Refills: 3 | Status: SHIPPED | OUTPATIENT
Start: 2021-08-20 | End: 2022-10-31

## 2021-08-20 RX ORDER — SERTRALINE HYDROCHLORIDE 25 MG/1
25 TABLET, FILM COATED ORAL DAILY
Qty: 30 TABLET | Refills: 3 | Status: SHIPPED | OUTPATIENT
Start: 2021-08-20 | End: 2022-01-19

## 2021-08-20 RX ORDER — SERTRALINE HYDROCHLORIDE 100 MG/1
100 TABLET, FILM COATED ORAL DAILY
Qty: 90 TABLET | Refills: 1 | Status: SHIPPED | OUTPATIENT
Start: 2021-08-20 | End: 2022-01-19

## 2021-08-20 ASSESSMENT — ENCOUNTER SYMPTOMS
EYE PAIN: 0
COUGH: 0
FREQUENCY: 0
SORE THROAT: 0
CHILLS: 0
ABDOMINAL PAIN: 0
JOINT SWELLING: 0
MYALGIAS: 0
DIARRHEA: 0
WEAKNESS: 0
DYSURIA: 0
HEARTBURN: 0
DIZZINESS: 0
PARESTHESIAS: 0
HEMATOCHEZIA: 0
SHORTNESS OF BREATH: 0
NERVOUS/ANXIOUS: 1
CONSTIPATION: 0
PALPITATIONS: 0
HEADACHES: 0
FEVER: 0
NAUSEA: 0
HEMATURIA: 0
ARTHRALGIAS: 0

## 2021-08-20 ASSESSMENT — ANXIETY QUESTIONNAIRES
GAD7 TOTAL SCORE: 9
5. BEING SO RESTLESS THAT IT IS HARD TO SIT STILL: NOT AT ALL
7. FEELING AFRAID AS IF SOMETHING AWFUL MIGHT HAPPEN: SEVERAL DAYS
8. IF YOU CHECKED OFF ANY PROBLEMS, HOW DIFFICULT HAVE THESE MADE IT FOR YOU TO DO YOUR WORK, TAKE CARE OF THINGS AT HOME, OR GET ALONG WITH OTHER PEOPLE?: SOMEWHAT DIFFICULT
3. WORRYING TOO MUCH ABOUT DIFFERENT THINGS: MORE THAN HALF THE DAYS
GAD7 TOTAL SCORE: 9
6. BECOMING EASILY ANNOYED OR IRRITABLE: NEARLY EVERY DAY
GAD7 TOTAL SCORE: 9
1. FEELING NERVOUS, ANXIOUS, OR ON EDGE: SEVERAL DAYS
7. FEELING AFRAID AS IF SOMETHING AWFUL MIGHT HAPPEN: SEVERAL DAYS
4. TROUBLE RELAXING: SEVERAL DAYS
2. NOT BEING ABLE TO STOP OR CONTROL WORRYING: SEVERAL DAYS

## 2021-08-20 ASSESSMENT — PATIENT HEALTH QUESTIONNAIRE - PHQ9
SUM OF ALL RESPONSES TO PHQ QUESTIONS 1-9: 1
SUM OF ALL RESPONSES TO PHQ QUESTIONS 1-9: 1
10. IF YOU CHECKED OFF ANY PROBLEMS, HOW DIFFICULT HAVE THESE PROBLEMS MADE IT FOR YOU TO DO YOUR WORK, TAKE CARE OF THINGS AT HOME, OR GET ALONG WITH OTHER PEOPLE: NOT DIFFICULT AT ALL

## 2021-08-20 ASSESSMENT — MIFFLIN-ST. JEOR: SCORE: 1643.74

## 2021-08-20 NOTE — PATIENT INSTRUCTIONS
Zoloft 125 mg daily for 4 weeks. If symptoms have not improved then you can increase by 25 mg every 4 weeks, up to 200 mg daily       Try pepcid 40 mg daily and stopping Prilosec     Preventive Health Recommendations  Female Ages 26 - 39  Yearly exam:   See your health care provider every year in order to    Review health changes.     Discuss preventive care.      Review your medicines if you your doctor has prescribed any.    Until age 30: Get a Pap test every three years (more often if you have had an abnormal result).    After age 30: Talk to your doctor about whether you should have a Pap test every 3 years or have a Pap test with HPV screening every 5 years.   You do not need a Pap test if your uterus was removed (hysterectomy) and you have not had cancer.  You should be tested each year for STDs (sexually transmitted diseases), if you're at risk.   Talk to your provider about how often to have your cholesterol checked.  If you are at risk for diabetes, you should have a diabetes test (fasting glucose).  Shots: Get a flu shot each year. Get a tetanus shot every 10 years.   Nutrition:     Eat at least 5 servings of fruits and vegetables each day.    Eat whole-grain bread, whole-wheat pasta and brown rice instead of white grains and rice.    Get adequate Calcium and Vitamin D.     Lifestyle    Exercise at least 150 minutes a week (30 minutes a day, 5 days of the week). This will help you control your weight and prevent disease.    Limit alcohol to one drink per day.    No smoking.     Wear sunscreen to prevent skin cancer.    See your dentist every six months for an exam and cleaning.

## 2021-08-20 NOTE — PROGRESS NOTES
SUBJECTIVE:   CC: Mervin Pacheco is an 26 year old woman who presents for preventive health visit.     Patient has been advised of split billing requirements and indicates understanding: Yes  Healthy Habits:     Getting at least 3 servings of Calcium per day:  Yes    Bi-annual eye exam:  Yes    Dental care twice a year:  Yes    Sleep apnea or symptoms of sleep apnea:  None    Diet:  Regular (no restrictions)    Frequency of exercise:  2-3 days/week    Duration of exercise:  30-45 minutes    Taking medications regularly:  Yes    Barriers to taking medications:  None    Medication side effects:  None    PHQ-2 Total Score: 0    Additional concerns today:  No        Today's PHQ-2 Score:   PHQ-2 ( 1999 [employer]) 8/20/2021   Q1: Little interest or pleasure in doing things 0   Q2: Feeling down, depressed or hopeless 0   PHQ-2 Score 0   Q1: Little interest or pleasure in doing things Not at all   Q2: Feeling down, depressed or hopeless Not at all   PHQ-2 Score 0       Abuse: Current or Past (Physical, Sexual or Emotional) - No  Do you feel safe in your environment? Yes    Have you ever done Advance Care Planning? (For example, a Health Directive, POLST, or a discussion with a medical provider or your loved ones about your wishes): Yes, patient states has an Advance Care Planning document and will bring a copy to the clinic.    Social History     Tobacco Use     Smoking status: Never Smoker     Smokeless tobacco: Never Used   Substance Use Topics     Alcohol use: Yes     Comment: less than 1x a week     If you drink alcohol do you typically have >3 drinks per day or >7 drinks per week? No    Alcohol Use 8/20/2021   Prescreen: >3 drinks/day or >7 drinks/week? No   Prescreen: >3 drinks/day or >7 drinks/week? -       Reviewed orders with patient.  Reviewed health maintenance and updated orders accordingly - Yes    Breast Cancer Screening:    FHS-7:   Breast CA Risk Assessment (FHS-7) 8/20/2021   Did any of your first-degree  "relatives have breast or ovarian cancer? No   Did any of your relatives have bilateral breast cancer? Yes   Did any man in your family have breast cancer? No   Did any woman in your family have breast and ovarian cancer? Yes   Did any woman in your family have breast cancer before age 50 y? Yes   Do you have 2 or more relatives with breast and/or ovarian cancer? No   Do you have 2 or more relatives with breast and/or bowel cancer? No       Pertinent mammograms are reviewed under the imaging tab.    History of abnormal Pap smear: NO - age 21-29 PAP every 3 years recommended  PAP / HPV 1/11/2021 7/18/2018 9/1/2015   PAP (Historical) NIL NIL Negative     Reviewed and updated as needed this visit by clinical staff  Tobacco  Allergies  Meds   Med Hx  Surg Hx  Fam Hx  Soc Hx        Reviewed and updated as needed this visit by Provider                    Review of Systems   Constitutional: Negative for chills and fever.   HENT: Negative for congestion, ear pain, hearing loss and sore throat.    Eyes: Negative for pain and visual disturbance.   Respiratory: Negative for cough and shortness of breath.    Cardiovascular: Negative for chest pain, palpitations and peripheral edema.   Gastrointestinal: Negative for abdominal pain, constipation, diarrhea, heartburn, hematochezia and nausea.   Genitourinary: Negative for dysuria, frequency, genital sores, hematuria and urgency.   Musculoskeletal: Negative for arthralgias, joint swelling and myalgias.   Skin: Negative for rash.   Neurological: Negative for dizziness, weakness, headaches and paresthesias.   Psychiatric/Behavioral: Negative for mood changes. The patient is nervous/anxious.           OBJECTIVE:   Physical Exam   /66 (BP Location: Right arm, Patient Position: Chair, Cuff Size: Adult Regular)   Pulse 64   Temp 98.5  F (36.9  C) (Tympanic)   Ht 1.638 m (5' 4.5\")   Wt 92.1 kg (203 lb)   LMP 08/05/2021   SpO2 97%   Breastfeeding No   BMI 34.31 kg/m  " "  GENERAL: healthy, alert and no distress  EYES: Eyes grossly normal to inspection,conjunctivae and sclerae normal  HENT: ear canals and TM's normal, nose and mouth without ulcers or lesions  NECK: no adenopathy, no asymmetry, masses, or scars and thyroid normal to palpation  RESP: lungs clear to auscultation - no rales, rhonchi or wheezes  CV: regular rate and rhythm, normal S1 S2  ABDOMEN: soft, nontender, no hepatosplenomegaly, no masses and bowel sounds normal  MS: no gross musculoskeletal defects noted, no edema  SKIN: no suspicious lesions or rashes  NEURO: Normal strength and tone, mentation intact and speech normal  PSYCH: mentation appears normal, affect normal/bright    Diagnostic Test Results:  Labs reviewed in Epic    ASSESSMENT/PLAN:     1. Routine general medical examination at a health care facility  - REVIEW OF HEALTH MAINTENANCE PROTOCOL ORDERS    2. NURY (generalized anxiety disorder)  - advised below  Zoloft 125 mg daily for 4 weeks. If symptoms have not improved then you can increase by 25 mg every 4 weeks, up to 200 mg daily   - sertraline (ZOLOFT) 100 MG tablet; Take 1 tablet (100 mg) by mouth daily Zoloft 125 mg daily  Dispense: 90 tablet; Refill: 1  - sertraline (ZOLOFT) 25 MG tablet; Take 1 tablet (25 mg) by mouth daily Zoloft 125 mg daily  Dispense: 30 tablet; Refill: 3    3. Gastroesophageal reflux disease with esophagitis without hemorrhage  - advised to stop Prilosec and start Pepcid, continue to monitor symptoms   - famotidine (PEPCID) 40 MG tablet; Take 1 tablet (40 mg) by mouth daily  Dispense: 90 tablet; Refill: 3      Patient has been advised of split billing requirements and indicates understanding: Yes  COUNSELING:  Reviewed preventive health counseling, as reflected in patient instructions    Estimated body mass index is 35.49 kg/m  as calculated from the following:    Height as of 1/11/21: 1.638 m (5' 4.5\").    Weight as of 1/11/21: 95.3 kg (210 lb).        She reports that she " has never smoked. She has never used smokeless tobacco.      Counseling Resources:  ATP IV Guidelines  Pooled Cohorts Equation Calculator  Breast Cancer Risk Calculator  BRCA-Related Cancer Risk Assessment: FHS-7 Tool  FRAX Risk Assessment  ICSI Preventive Guidelines  Dietary Guidelines for Americans, 2010  USDA's MyPlate  ASA Prophylaxis  Lung CA Screening    Veena Figueroa MD  [facility]  Answers for HPI/ROS submitted by the patient on 8/20/2021  If you checked off any problems, how difficult have these problems made it for you to do your work, take care of things at home, or get along with other people?: Not difficult at all  PHQ9 TOTAL SCORE: 1  NURY 7 TOTAL SCORE: 9

## 2021-08-21 ASSESSMENT — PATIENT HEALTH QUESTIONNAIRE - PHQ9: SUM OF ALL RESPONSES TO PHQ QUESTIONS 1-9: 1

## 2021-08-21 ASSESSMENT — ANXIETY QUESTIONNAIRES: GAD7 TOTAL SCORE: 9

## 2021-10-05 ENCOUNTER — MYC MEDICAL ADVICE (OUTPATIENT)
Dept: FAMILY MEDICINE | Facility: CLINIC | Age: 26
End: 2021-10-05

## 2021-10-05 DIAGNOSIS — K21.00 GASTROESOPHAGEAL REFLUX DISEASE WITH ESOPHAGITIS WITHOUT HEMORRHAGE: Primary | ICD-10-CM

## 2021-10-07 NOTE — TELEPHONE ENCOUNTER
"Dr. Figueroa-please review and advise.    1. Writer planned to recommend patient schedule a visit to discuss symptoms and EGD request    \"Hi Dr. Figueroa!  I realized that I discontinued with my GI doctor when he left the clinic, you switched me to Pepcid, but I'm noticing consistent esophagitis. He had suggested an upper endoscopy about a year ago, would you recommend the same?\"    Thank you!  CHERYLE CheneyN, RN  [facility]    "

## 2021-10-07 NOTE — TELEPHONE ENCOUNTER
Dr. Figueroa-Please advise if you recommend daily or every other day dosing for Prilosec.    Thank you!  CHERYLE CheneyN, RN  [facility]

## 2021-10-09 ENCOUNTER — HEALTH MAINTENANCE LETTER (OUTPATIENT)
Age: 26
End: 2021-10-09

## 2022-01-10 ENCOUNTER — E-VISIT (OUTPATIENT)
Dept: FAMILY MEDICINE | Facility: CLINIC | Age: 27
End: 2022-01-10

## 2022-01-10 DIAGNOSIS — F41.1 GAD (GENERALIZED ANXIETY DISORDER): ICD-10-CM

## 2022-01-10 DIAGNOSIS — F41.0 PANIC ATTACK: Primary | ICD-10-CM

## 2022-01-10 PROCEDURE — 99422 OL DIG E/M SVC 11-20 MIN: CPT | Performed by: FAMILY MEDICINE

## 2022-01-10 RX ORDER — LORAZEPAM 0.5 MG/1
.5-1 TABLET ORAL EVERY 8 HOURS PRN
Qty: 30 TABLET | Refills: 0 | Status: SHIPPED | OUTPATIENT
Start: 2022-01-10 | End: 2022-10-31

## 2022-01-10 RX ORDER — PROPRANOLOL HYDROCHLORIDE 10 MG/1
10 TABLET ORAL DAILY PRN
Qty: 30 TABLET | Refills: 0 | Status: SHIPPED | OUTPATIENT
Start: 2022-01-10 | End: 2022-09-08

## 2022-01-10 ASSESSMENT — PATIENT HEALTH QUESTIONNAIRE - PHQ9
SUM OF ALL RESPONSES TO PHQ QUESTIONS 1-9: 15
SUM OF ALL RESPONSES TO PHQ QUESTIONS 1-9: 15
10. IF YOU CHECKED OFF ANY PROBLEMS, HOW DIFFICULT HAVE THESE PROBLEMS MADE IT FOR YOU TO DO YOUR WORK, TAKE CARE OF THINGS AT HOME, OR GET ALONG WITH OTHER PEOPLE: VERY DIFFICULT

## 2022-01-10 ASSESSMENT — ANXIETY QUESTIONNAIRES
8. IF YOU CHECKED OFF ANY PROBLEMS, HOW DIFFICULT HAVE THESE MADE IT FOR YOU TO DO YOUR WORK, TAKE CARE OF THINGS AT HOME, OR GET ALONG WITH OTHER PEOPLE?: EXTREMELY DIFFICULT
6. BECOMING EASILY ANNOYED OR IRRITABLE: NOT AT ALL
3. WORRYING TOO MUCH ABOUT DIFFERENT THINGS: NEARLY EVERY DAY
GAD7 TOTAL SCORE: 18
7. FEELING AFRAID AS IF SOMETHING AWFUL MIGHT HAPPEN: NEARLY EVERY DAY
2. NOT BEING ABLE TO STOP OR CONTROL WORRYING: NEARLY EVERY DAY
1. FEELING NERVOUS, ANXIOUS, OR ON EDGE: NEARLY EVERY DAY
GAD7 TOTAL SCORE: 18
GAD7 TOTAL SCORE: 18
4. TROUBLE RELAXING: NEARLY EVERY DAY
5. BEING SO RESTLESS THAT IT IS HARD TO SIT STILL: NEARLY EVERY DAY
7. FEELING AFRAID AS IF SOMETHING AWFUL MIGHT HAPPEN: NEARLY EVERY DAY

## 2022-01-11 ASSESSMENT — ANXIETY QUESTIONNAIRES: GAD7 TOTAL SCORE: 18

## 2022-01-11 ASSESSMENT — PATIENT HEALTH QUESTIONNAIRE - PHQ9: SUM OF ALL RESPONSES TO PHQ QUESTIONS 1-9: 15

## 2022-01-19 RX ORDER — SERTRALINE HYDROCHLORIDE 100 MG/1
100 TABLET, FILM COATED ORAL DAILY
Qty: 90 TABLET | Refills: 1 | Status: SHIPPED | OUTPATIENT
Start: 2022-01-19 | End: 2022-08-04

## 2022-05-10 NOTE — ED TRIAGE NOTES
Back and Spine Consult    Patient ID: Hallie Calvo is a 67 y.o. female.    Chief Complaint   Patient presents with    Low-back Pain     Had fall 4/27/22 and has had lbp that radiates down the left leg, comes & goes, sharp and grabbing.       Review of Systems   Constitutional: Negative for activity change, appetite change, chills, fever and unexpected weight change.   HENT: Negative for tinnitus, trouble swallowing and voice change.    Respiratory: Negative for apnea, cough, chest tightness and shortness of breath.    Cardiovascular: Negative for chest pain and palpitations.   Gastrointestinal: Negative for constipation, diarrhea, nausea and vomiting.   Genitourinary: Negative for difficulty urinating, dysuria, frequency and urgency.   Musculoskeletal: Positive for back pain. Negative for gait problem, neck pain and neck stiffness.   Skin: Negative for wound.   Neurological: Negative for dizziness, tremors, seizures, facial asymmetry, speech difficulty, weakness, light-headedness, numbness and headaches.   Psychiatric/Behavioral: Negative for confusion and decreased concentration.       Past Medical History:   Diagnosis Date    Age-related nuclear cataract, bilateral 05/2016    Anemia     Arthritis     Chronic Left Knee Pain     Depressive disorder, not elsewhere classified     Hemorrhoids     [facility] from [facility]    HTN (hypertension)     Hyperlipidemia     Insomnia     Left Leg Pain     Nonspecific abnormal electrocardiogram (ECG) (EKG)     Obstructive sleep apnea (adult) (pediatric)     uses cpap    Pica      Social History     Socioeconomic History    Marital status:      Spouse name: Michael    Number of children: 3   Occupational History    Occupation: Nurse   Tobacco Use    Smoking status: Never Smoker    Smokeless tobacco: Never Used   Substance and Sexual Activity    Alcohol use: Yes     Comment: socially    Drug use: No    Sexual activity: Not Currently     Partners: Male  Diagnosis of gastroparesis and dumping. Today woke up with onset of abdominal pain, N/V/D, reports pain is stabbing. Took zofran and tylenol.        Social Determinants of Health     Financial Resource Strain: Medium Risk    Difficulty of Paying Living Expenses: Somewhat hard   Food Insecurity: No Food Insecurity    Worried About Running Out of Food in the Last Year: Never true    Ran Out of Food in the Last Year: Never true   Transportation Needs: No Transportation Needs    Lack of Transportation (Medical): No    Lack of Transportation (Non-Medical): No   Social Connections: Unknown    Frequency of Communication with Friends and Family: More than three times a week    Frequency of Social Gatherings with Friends and Family: Once a week    Attends Christian Services: 1 to 4 times per year    Active Member of Clubs or Organizations: No    Attends Club or Organization Meetings: Never     Family History   Problem Relation Age of Onset    Stroke Father     Hypertension Father     Hyperlipidemia Father     Diabetes Father     Heart disease Father     Arthritis Father     Kidney disease Father     Thyroid disease Mother     Heart disease Mother     Alzheimer's disease Mother     Arthritis Mother     Depression Mother     Stomach cancer Maternal Grandmother     Diabetes Sister     Depression Sister     Arthritis Sister     Diabetes Brother     Depression Brother     Frontotemporal dementia Sister     Arthritis Sister     Arthritis Brother     COPD Brother     Ovarian cancer Maternal Aunt 40     Review of patient's allergies indicates:  No Known Allergies    Current Outpatient Medications:     BIOTIN ORAL, biotin  1,000 mg daily, Disp: , Rfl:     calcium carbonate (OS-NAVEEN) 600 mg calcium (1,500 mg) Tab, Calcium 600, Disp: , Rfl:     cetirizine (ZYRTEC) 10 MG tablet, Take 10 mg by mouth every evening., Disp: , Rfl:     esomeprazole (NEXIUM) 40 MG capsule, Take 1 capsule (40 mg total) by mouth once daily., Disp: 30 capsule, Rfl: 6    estradioL (ESTRACE) 0.01 % (0.1 mg/gram) vaginal cream, Insert 1 gram vaginally 3 x every week,  "Disp: 42.5 Tube, Rfl: 0    ferrous sulfate 325 mg (65 mg iron) Tab tablet, Take 325 mg by mouth daily with breakfast., Disp: , Rfl:     KRILL OIL ORAL, Take 1 capsule by mouth every evening., Disp: , Rfl:     meloxicam (MOBIC) 15 MG tablet, Take 1 tablet (15 mg total) by mouth daily as needed for Pain., Disp: 90 tablet, Rfl: 1    multivitamin (THERAGRAN) per tablet, Take 1 tablet by mouth once daily., Disp: , Rfl:     nystatin-triamcinolone (MYCOLOG II) cream, Apply to affected area 2 times daily, Disp: 30 g, Rfl: 0    olmesartan (BENICAR) 5 MG Tab, TAKE 1 TABLET BY MOUTH once daily, Disp: 30 tablet, Rfl: 5    propranoloL (INDERAL LA) 60 MG 24 hr capsule, TAKE 1 CAPSULE BY MOUTH once daily, Disp: 90 capsule, Rfl: 3    sucralfate (CARAFATE) 1 gram tablet, TAKE 1 TABLET BY MOUTH BEFORE BREAKFAST & AT BEDTIME, Disp: 90 tablet, Rfl: 3    temazepam (RESTORIL) 15 mg Cap, TAKE 1 CAPSULE BY MOUTH nightly AS NEEDED, Disp: 30 capsule, Rfl: 2    TURMERIC ORAL, Take 1 capsule by mouth every evening., Disp: , Rfl:     venlafaxine (EFFEXOR-XR) 150 MG Cp24, TAKE 1 CAPSULE BY MOUTH EVERY DAY, Disp: 90 capsule, Rfl: 1    vitamin D (VITAMIN D3) 1000 units Tab, Take 1,000 Units by mouth once daily., Disp: , Rfl:     tiZANidine (ZANAFLEX) 4 MG tablet, Take 1 tablet (4 mg total) by mouth nightly as needed (muscle spasms/ pain)., Disp: 40 tablet, Rfl: 0    Vitals:    05/10/22 0908   BP: (!) 140/84   BP Location: Left arm   Patient Position: Sitting   BP Method: Large (Automatic)   Pulse: 62   Weight: 111.5 kg (245 lb 14.8 oz)   Height: 5' 1" (1.549 m)       Physical Exam  Vitals and nursing note reviewed.   Constitutional:       Appearance: She is well-developed.   HENT:      Head: Normocephalic and atraumatic.   Eyes:      Pupils: Pupils are equal, round, and reactive to light.   Cardiovascular:      Rate and Rhythm: Normal rate.   Pulmonary:      Effort: Pulmonary effort is normal.   Musculoskeletal:         General: " Normal range of motion.      Cervical back: Normal range of motion and neck supple.   Skin:     General: Skin is warm and dry.   Neurological:      Mental Status: She is alert and oriented to person, place, and time.      Coordination: Finger-Nose-Finger Test, Heel to Shin Test and Romberg Test normal.      Gait: Gait is intact. Tandem walk normal.      Deep Tendon Reflexes:      Reflex Scores:       Tricep reflexes are 2+ on the right side and 2+ on the left side.       Bicep reflexes are 2+ on the right side and 2+ on the left side.       Brachioradialis reflexes are 2+ on the right side and 2+ on the left side.       Patellar reflexes are 2+ on the right side and 2+ on the left side.       Achilles reflexes are 2+ on the right side and 2+ on the left side.  Psychiatric:         Speech: Speech normal.         Behavior: Behavior normal.         Thought Content: Thought content normal.         Judgment: Judgment normal.         Neurologic Exam     Mental Status   Oriented to person, place, and time.   Oriented to person.   Oriented to place.   Oriented to time.   Follows 3 step commands.   Attention: normal. Concentration: normal.   Speech: speech is normal   Level of consciousness: alert  Knowledge: consistent with education.   Able to name object. Able to read. Able to repeat. Able to write. Normal comprehension.     Cranial Nerves     CN II   Visual acuity: normal  Right visual field deficit: none  Left visual field deficit: none     CN III, IV, VI   Pupils are equal, round, and reactive to light.  Right pupil: Size: 3 mm. Shape: regular. Reactivity: brisk. Consensual response: intact.   Left pupil: Size: 3 mm. Shape: regular. Reactivity: brisk. Consensual response: intact.   CN III: no CN III palsy  CN VI: no CN VI palsy  Nystagmus: none   Diplopia: none  Ophthalmoparesis: none  Conjugate gaze: present    CN V   Right facial sensation deficit: none  Left facial sensation deficit: none    CN VII   Right facial  weakness: none  Left facial weakness: none    CN VIII   Hearing: intact    CN IX, X   CN IX normal.   CN X normal.     CN XI   Right sternocleidomastoid strength: normal  Left sternocleidomastoid strength: normal  Right trapezius strength: normal  Left trapezius strength: normal    CN XII   Fasciculations: absent  Tongue deviation: none    Motor Exam   Muscle bulk: normal  Overall muscle tone: normal  Right arm pronator drift: absent  Left arm pronator drift: absent    Strength   Right neck flexion: 5/5  Left neck flexion: 5/5  Right neck extension: 5/5  Left neck extension: 5/5  Right deltoid: 5/5  Left deltoid: 5/5  Right biceps: 5/5  Left biceps: 5/5  Right triceps: 5/5  Left triceps: 5/5  Right wrist flexion: 5/5  Left wrist flexion: 5/5  Right wrist extension: 5/5  Left wrist extension: 5/5  Right interossei: 5/5  Left interossei: 5/5  Right abdominals: 5/5  Left abdominals: 5/5  Right iliopsoas: 5/5  Left iliopsoas: 5/5  Right quadriceps: 5/5  Left quadriceps: 5/5  Right hamstrin/5  Left hamstrin/5  Right glutei: 5/5  Left glutei: 5/5  Right anterior tibial: 5/5  Left anterior tibial: 5/5  Right posterior tibial: 5/5  Left posterior tibial: 5/5  Right peroneal: 5/5  Left peroneal: 5/5  Right gastroc: 5/5  Left gastroc: 5/5    Sensory Exam   Right arm light touch: normal  Left arm light touch: normal  Right leg light touch: normal  Left leg light touch: normal  Right arm vibration: normal  Left arm vibration: normal  Right arm pinprick: normal  Left arm pinprick: normal    Gait, Coordination, and Reflexes     Gait  Gait: normal    Coordination   Romberg: negative  Finger to nose coordination: normal  Heel to shin coordination: normal  Tandem walking coordination: normal    Tremor   Resting tremor: absent  Intention tremor: absent  Action tremor: absent    Reflexes   Right brachioradialis: 2+  Left brachioradialis: 2+  Right biceps: 2+  Left biceps: 2+  Right triceps: 2+  Left triceps: 2+  Right  patellar: 2+  Left patellar: 2+  Right achilles: 2+  Left achilles: 2+  Right Chen: absent  Left Chen: absent  Right ankle clonus: absent  Left ankle clonus: absent      Provider dictation:    67-year-old female with hypertension, hyperlipidemia, depression is referred by Dr. Jacobo for evaluation of lumbar back pain.  She fell 04/19/2022 while walking out of work.  She tripped on a mat.  This is a worker's comp visit.  With the fall she dislocated the right 5th digit and is currently in a splint.  She also injured the right knee and is currently getting better.  A few days after the fall she developed pain in the left lower back and buttock with radiation to the left posterior leg.  She has alternated Mobic and ibuprofen on occasion to help with pain.  Pain has improved since onset.  Current Malou feels focal left lower back and buttock pain without any radicular leg pain, numbness, tingling.    Current medications:  Mobic and ibuprofen on occasion  Physical therapy:  none  Interventional Procedures:  none     On exam, there is 5/5 strength with 2+ DTR and no sensory deficits.  Gait and station fluid.  Denies bowel/ bladder dysfunction.  Full range of motion of the upper and lower extremities. No pain with axial facet loading.  No SI joint pain on palpation.  No pain with lumbar flexion/ extension.    04/19/2022 x-ray lumbar spine reveals anterolisthesis of L4 forward on L5.  Trace anterolisthesis L5 on S1.  Multilevel degenerative changes and facet arthropathy.  No evidence of compression fracture.    Ms. Sterling has acute onset lower back and left leg pain that has improved.  Left leg radicular plane completely resolved.  Residual left lower back pain only at this time.  Pain can be myofascial and or related to degenerative changes/spondylolisthesis at L4-5.  She has no current neurological deficits.  We discussed medication management and physical therapy.  She would like to continue alternating Mobic and  ibuprofen as needed.  Will add Zanaflex at night time for spasms and pain control.  The I am referring to Physical therapy to help with pain control and further improvement.  Follow-up as needed if no further improvement or worsening of pain, at which time we would consider proceeding with MRI lumbar spine.    Visit Diagnosis:  Lumbar degenerative disc disease  -     Ambulatory referral/consult to Physical/Occupational Therapy; Future; Expected date: 05/17/2022  -     tiZANidine (ZANAFLEX) 4 MG tablet; Take 1 tablet (4 mg total) by mouth nightly as needed (muscle spasms/ pain).  Dispense: 40 tablet; Refill: 0    Left-sided low back pain without sciatica, unspecified chronicity  -     Ambulatory referral/consult to Physical/Occupational Therapy; Future; Expected date: 05/17/2022  -     tiZANidine (ZANAFLEX) 4 MG tablet; Take 1 tablet (4 mg total) by mouth nightly as needed (muscle spasms/ pain).  Dispense: 40 tablet; Refill: 0        Total time spent counseling greater than fifty percent of total visit time.  Counseling included discussion regarding imaging findings, diagnosis possibilities, treatment options, risks and benefits.   The patient had many questions regarding the options and long-term effects.

## 2022-08-01 DIAGNOSIS — F41.1 GAD (GENERALIZED ANXIETY DISORDER): ICD-10-CM

## 2022-08-01 DIAGNOSIS — F41.0 PANIC ATTACK: ICD-10-CM

## 2022-08-04 RX ORDER — SERTRALINE HYDROCHLORIDE 100 MG/1
100 TABLET, FILM COATED ORAL DAILY
Qty: 90 TABLET | Refills: 0 | Status: SHIPPED | OUTPATIENT
Start: 2022-08-04 | End: 2022-10-31

## 2022-08-04 NOTE — TELEPHONE ENCOUNTER
---Prescription approved per [facility] Refill Protocol.       Funmilayo Harrison RN BSN     [facility]        --Last visit:  8/20/2021     --Future Visit: NONE.

## 2022-08-12 ENCOUNTER — VIRTUAL VISIT (OUTPATIENT)
Dept: URGENT CARE | Facility: CLINIC | Age: 27
End: 2022-08-12
Payer: COMMERCIAL

## 2022-08-12 DIAGNOSIS — F41.9 ANXIETY: ICD-10-CM

## 2022-08-12 DIAGNOSIS — U07.1 INFECTION DUE TO 2019 NOVEL CORONAVIRUS: Primary | ICD-10-CM

## 2022-08-12 DIAGNOSIS — E66.01 MORBID OBESITY (H): ICD-10-CM

## 2022-08-12 DIAGNOSIS — Z87.09 HISTORY OF ASTHMA: ICD-10-CM

## 2022-08-12 PROCEDURE — 99214 OFFICE O/P EST MOD 30 MIN: CPT | Mod: CS | Performed by: INTERNAL MEDICINE

## 2022-08-12 NOTE — PROGRESS NOTES
"  Mervin Pacheco is a 27 year old female who is being evaluated via a billable video visit.      The patient has been notified of following:     \"This video visit will be conducted via a video call between you and your physician/provider. We have found that certain health care needs can be provided without the need for a physical exam.  This service lets us provide the care you need with a video conversation.  If a prescription is necessary we can send it directly to your pharmacy.  If lab work is needed we can place an order for that and you can then stop by our lab to have the test done at a later time.\"     Patient has given verbal consent for video visit?  YES    SUBJECTIVE:  Mervin Pacheco is an 27 year old female who presents for covid.  Last night started feeling a little off.  Then today felt much worse with body aches, fever to 101, fatigue.  Also has some cough, nasal congestion, and sore throat.  No v/d.  Has been vaccinated and received one booster.  No shortness of breath.    PMH:   has a past medical history of Anxiety (Age 22), Eating disorder, Esophageal reflux (Age 8), Gastroparesis, Genital problems (Age 22), and GERD (gastroesophageal reflux disease). asthma  Patient Active Problem List   Diagnosis     Genito-pelvic pain/penetration disorder     Vulvodynia     Seasonal allergic rhinitis due to pollen     Allergic rhinitis due to dust mite     Ear itching     GERD (gastroesophageal reflux disease)     Lactose intolerance     Morbid obesity (H)     Social History     Socioeconomic History     Marital status:    Tobacco Use     Smoking status: Never Smoker     Smokeless tobacco: Never Used   Substance and Sexual Activity     Alcohol use: Yes     Comment: less than 1x a week     Drug use: No     Sexual activity: Yes     Partners: Male     Birth control/protection: Condom     Family History   Problem Relation Age of Onset     Obesity Mother      Velasquez's esophagus Mother      Other - See " Comments Mother         eosinophilic esophagitis     Hypertension Father      Hyperlipidemia Father      Diabetes Father      Anxiety Disorder Father      Stomach Cancer Maternal Grandmother      Breast Cancer Maternal Aunt      Diabetes Paternal Grandfather      Diabetes Paternal Grandmother      Other Cancer Paternal Grandmother         Stomach cancer     Anxiety Disorder Sister        ALLERGIES:  Patient has no known allergies.    Current Outpatient Medications   Medication     famotidine (PEPCID) 40 MG tablet     LORazepam (ATIVAN) 0.5 MG tablet     omeprazole (PRILOSEC) 20 MG DR capsule     propranolol (INDERAL) 10 MG tablet     sertraline (ZOLOFT) 100 MG tablet     sertraline (ZOLOFT) 50 MG tablet     No current facility-administered medications for this visit.     Facility-Administered Medications Ordered in Other Visits   Medication     barium sulfate 40% (VARIBAR THIN HONEY) oral suspension 20 mL         ROS:  ROS is done and is negative for general/constitutional, eye, ENT, Respiratory, cardiovascular, GI, , Skin, musculoskeletal except as noted elsewhere.  All other review of systems negative except as noted elsewhere.      OBJECTIVE:    No vital signs obtained as is virtual visit    GENERAL: Healthy, alert and no distress, appears tired.  EYES: Eyes grossly normal to inspection.  No discharge or erythema, or obvious scleral/conjunctival abnormalities.  RESP: No audible wheeze, cough, or visible cyanosis.  No visible retractions or increased work of breathing.    SKIN: Visible skin clear. No significant rash, abnormal pigmentation or lesions.  NEURO: Cranial nerves grossly intact.  Mentation and speech appropriate for age.  PSYCH: Mentation appears normal, affect normal/bright, judgement and insight intact, normal speech and appearance well-groomed.           ASSESSMENT/PLAN:    ASSESSMENT / PLAN:  (U07.1) Infection due to 2019 novel coronavirus  (primary encounter diagnosis)  Comment: sxs with positive  test.  Has risk factors so will tx.  Plan: nirmatrelvir and ritonavir (PAXLOVID) therapy         pack        Reviewed medication instructions and side effects. Follow up if experiences side effects..  Advised to not take prn propranolol and to only take 1/2 tablet of prn lorazepam if needed for anxiety while on Paxlovd.  I reviewed supportive care, otc meds to use if needed, expected course, and signs of concern.  Follow up as needed or if she does not improve within 5 day(s) or if worsens in any way.  Reviewed red flag symptoms and is to go to the ER if experiences any of these.  Reviewed quarantine.    (E66.01) Morbid obesity (H)  Comment: increases risk of complicaiotns from coivd  Plan: treat covid as above    (F41.9) Anxiety  Comment: has hx of.    Plan: advised to avoid use of propranolol and lorazepam while on paxlovid, and if needed can take 1/2 tab of lorazepam, but as does not need these often is to avoid taking them if possible    (Z87.09) History of asthma  Comment: increases risk of complications from covid.  Plan: treat covid as above.          See [facility] for orders, medications, letters, patient instructions    Casie Cornejo MD  8/12/2022, 4:59 PM    Video-Visit Details    Video Start Time: 4:58p    Type of service:  Video Visit    Video End Time:5:14 PM    Originating Location (pt. Location): Home    Distant Location (provider location):  [facility]     Platform used for Video Visit: [employer]

## 2022-08-12 NOTE — PATIENT INSTRUCTIONS
Do NOT take your propranolol while taking Paxlovid.  Do NOT take lorazepam while taking Paxlovid unless you are having severe anxiety and in that case take only 1/2 tablet.

## 2022-09-08 ENCOUNTER — MYC REFILL (OUTPATIENT)
Dept: FAMILY MEDICINE | Facility: CLINIC | Age: 27
End: 2022-09-08

## 2022-09-08 DIAGNOSIS — F41.1 GAD (GENERALIZED ANXIETY DISORDER): ICD-10-CM

## 2022-09-08 DIAGNOSIS — F41.0 PANIC ATTACK: ICD-10-CM

## 2022-09-11 ENCOUNTER — HEALTH MAINTENANCE LETTER (OUTPATIENT)
Age: 27
End: 2022-09-11

## 2022-09-11 RX ORDER — SERTRALINE HYDROCHLORIDE 100 MG/1
100 TABLET, FILM COATED ORAL DAILY
Qty: 90 TABLET | Refills: 0 | OUTPATIENT
Start: 2022-09-11

## 2022-09-12 RX ORDER — PROPRANOLOL HYDROCHLORIDE 10 MG/1
10 TABLET ORAL DAILY PRN
Qty: 90 TABLET | Refills: 0 | Status: SHIPPED | OUTPATIENT
Start: 2022-09-12 | End: 2023-10-10

## 2022-09-12 NOTE — TELEPHONE ENCOUNTER
Sertraline refused; filled for 90 days on 8/4/22 and pt has not scheduled.    Routing propranolol refill request to provider for review/approval because:   Blood pressure under 140/90 in past 12 months  BP Readings from Last 3 Encounters:   08/20/21 107/66   01/11/21 110/68   01/14/20 124/84     Prescribed for anxiety  Last Written Prescription Date:  1/10/22  Last Fill Quantity: 30,  # refills: 0   Last office visit: 8/20/2021 with prescribing provider:  Henry Cadet Office Visit:      Scheduling: please reach out to schedule annual to continue her sertraline    SHANTELL Mariscal RN  [facility]

## 2022-10-31 ENCOUNTER — OFFICE VISIT (OUTPATIENT)
Dept: FAMILY MEDICINE | Facility: CLINIC | Age: 27
End: 2022-10-31
Payer: COMMERCIAL

## 2022-10-31 VITALS
HEART RATE: 75 BPM | RESPIRATION RATE: 15 BRPM | DIASTOLIC BLOOD PRESSURE: 71 MMHG | WEIGHT: 199 LBS | SYSTOLIC BLOOD PRESSURE: 107 MMHG | BODY MASS INDEX: 33.15 KG/M2 | TEMPERATURE: 97.7 F | OXYGEN SATURATION: 95 % | HEIGHT: 65 IN

## 2022-10-31 DIAGNOSIS — Z51.81 ENCOUNTER FOR THERAPEUTIC DRUG MONITORING: ICD-10-CM

## 2022-10-31 DIAGNOSIS — Z13.1 SCREENING FOR DIABETES MELLITUS: ICD-10-CM

## 2022-10-31 DIAGNOSIS — K21.00 GASTROESOPHAGEAL REFLUX DISEASE WITH ESOPHAGITIS, UNSPECIFIED WHETHER HEMORRHAGE: ICD-10-CM

## 2022-10-31 DIAGNOSIS — F41.1 GAD (GENERALIZED ANXIETY DISORDER): ICD-10-CM

## 2022-10-31 DIAGNOSIS — Z00.00 ROUTINE GENERAL MEDICAL EXAMINATION AT A HEALTH CARE FACILITY: Primary | ICD-10-CM

## 2022-10-31 DIAGNOSIS — Z13.220 LIPID SCREENING: ICD-10-CM

## 2022-10-31 PROCEDURE — 99214 OFFICE O/P EST MOD 30 MIN: CPT | Mod: 25 | Performed by: FAMILY MEDICINE

## 2022-10-31 PROCEDURE — 99395 PREV VISIT EST AGE 18-39: CPT | Performed by: FAMILY MEDICINE

## 2022-10-31 RX ORDER — FAMOTIDINE 40 MG/1
40 TABLET, FILM COATED ORAL DAILY
Qty: 90 TABLET | Refills: 3 | Status: SHIPPED | OUTPATIENT
Start: 2022-10-31

## 2022-10-31 RX ORDER — FLUOXETINE 40 MG/1
40 CAPSULE ORAL DAILY
Qty: 30 CAPSULE | Refills: 1 | Status: SHIPPED | OUTPATIENT
Start: 2022-10-31 | End: 2023-01-03

## 2022-10-31 ASSESSMENT — ENCOUNTER SYMPTOMS
CHILLS: 0
SORE THROAT: 0
BREAST MASS: 0
ABDOMINAL PAIN: 0
HEMATOCHEZIA: 0
NAUSEA: 1
PALPITATIONS: 0
COUGH: 0
EYE PAIN: 0
DYSURIA: 0
CONSTIPATION: 0
FREQUENCY: 0
HEADACHES: 0
HEMATURIA: 0
FEVER: 0
NERVOUS/ANXIOUS: 1
PARESTHESIAS: 0
ARTHRALGIAS: 0
DIARRHEA: 0
WEAKNESS: 0
MYALGIAS: 0
HEARTBURN: 0
SHORTNESS OF BREATH: 0
DIZZINESS: 0
JOINT SWELLING: 0

## 2022-10-31 ASSESSMENT — PAIN SCALES - GENERAL: PAINLEVEL: NO PAIN (0)

## 2022-10-31 NOTE — PATIENT INSTRUCTIONS
Anxiety -   Stop zoloft 100 mg daily and the next day start prozac 40 mg daily. Please send me an update in 4 weeks if symptoms are not controlled.         Preventive Health Recommendations  Female Ages 26 - 39  Yearly exam:   See your health care provider every year in order to  Review health changes.   Discuss preventive care.    Review your medicines if you your doctor has prescribed any.    Until age 30: Get a Pap test every three years (more often if you have had an abnormal result).    After age 30: Talk to your doctor about whether you should have a Pap test every 3 years or have a Pap test with HPV screening every 5 years.   You do not need a Pap test if your uterus was removed (hysterectomy) and you have not had cancer.  You should be tested each year for STDs (sexually transmitted diseases), if you're at risk.   Talk to your provider about how often to have your cholesterol checked.  If you are at risk for diabetes, you should have a diabetes test (fasting glucose).  Shots: Get a flu shot each year. Get a tetanus shot every 10 years.   Nutrition:   Eat at least 5 servings of fruits and vegetables each day.  Eat whole-grain bread, whole-wheat pasta and brown rice instead of white grains and rice.  Get adequate Calcium and Vitamin D.     Lifestyle  Exercise at least 150 minutes a week (30 minutes a day, 5 days of the week). This will help you control your weight and prevent disease.  Limit alcohol to one drink per day.  No smoking.   Wear sunscreen to prevent skin cancer.  See your dentist every six months for an exam and cleaning.

## 2022-10-31 NOTE — PROGRESS NOTES
SUBJECTIVE:   CC: Mervin is an 27 year old who presents for preventive health visit.       Patient has been advised of split billing requirements and indicates understanding: Yes  Healthy Habits:     Getting at least 3 servings of Calcium per day:  Yes    Bi-annual eye exam:  Yes    Dental care twice a year:  Yes    Sleep apnea or symptoms of sleep apnea:  Daytime drowsiness    Diet:  Regular (no restrictions)    Frequency of exercise:  1 day/week    Duration of exercise:  15-30 minutes    Taking medications regularly:  Yes    Medication side effects:  None    PHQ-2 Total Score: 0    Additional concerns today:  Yes       pt state in switching medications sertraline to lexapro      She is still having somatic symptoms with Zoloft including nausea, stomach ache, fatigue and headaches. She is currently on Zoloft 100 mg daily and no longer experiencing withdrawal symptoms. Buspar was not helpful. Mom previously on prozac which was helpful.     GERD - she is currently on pepcid 20 mg daily     Today's PHQ-2 Score:   PHQ-2 ( 1999 Pfizer) 10/31/2022   Q1: Little interest or pleasure in doing things 0   Q2: Feeling down, depressed or hopeless 0   PHQ-2 Score 0   PHQ-2 Total Score (12-17 Years)- Positive if 3 or more points; Administer PHQ-A if positive -   Q1: Little interest or pleasure in doing things Not at all   Q2: Feeling down, depressed or hopeless Not at all   PHQ-2 Score 0       Abuse: Current or Past (Physical, Sexual or Emotional) - No  Do you feel safe in your environment? Yes        Social History     Tobacco Use     Smoking status: Never     Smokeless tobacco: Never   Substance Use Topics     Alcohol use: Yes     Comment: less than 1x a week     If you drink alcohol do you typically have >3 drinks per day or >7 drinks per week? No    Alcohol Use 10/31/2022   Prescreen: >3 drinks/day or >7 drinks/week? No   Prescreen: >3 drinks/day or >7 drinks/week? -       Reviewed orders with patient.  Reviewed  health maintenance and updated orders accordingly - Yes      Breast Cancer Screening:    FHS-7:   Breast CA Risk Assessment (FHS-7) 8/20/2021 10/31/2022   Did any of your first-degree relatives have breast or ovarian cancer? No No   Did any of your relatives have bilateral breast cancer? Yes Yes   Did any man in your family have breast cancer? No No   Did any woman in your family have breast and ovarian cancer? Yes Unknown   Did any woman in your family have breast cancer before age 50 y? Yes Yes   Do you have 2 or more relatives with breast and/or ovarian cancer? No No    Do you have 2 or more relatives with breast and/or bowel cancer? No Unknown     Maternal aunt - breast cancer before age 50      Pertinent mammograms are reviewed under the imaging tab.    History of abnormal Pap smear: NO - age 30-65 PAP every 5 years with negative HPV co-testing recommended  PAP / HPV 1/11/2021 7/18/2018 9/1/2015   PAP (Historical) NIL NIL Negative     Reviewed and updated as needed this visit by clinical staff   Tobacco  Allergies  Meds   Med Hx  Surg Hx  Fam Hx  Soc Hx        Reviewed and updated as needed this visit by Provider                     Review of Systems   Constitutional: Negative for chills and fever.   HENT: Negative for congestion, ear pain, hearing loss and sore throat.    Eyes: Negative for pain and visual disturbance.   Respiratory: Negative for cough and shortness of breath.    Cardiovascular: Negative for chest pain, palpitations and peripheral edema.   Gastrointestinal: Positive for nausea. Negative for abdominal pain, constipation, diarrhea, heartburn and hematochezia.   Breasts:  Negative for tenderness, breast mass and discharge.   Genitourinary: Negative for dysuria, frequency, genital sores, hematuria, pelvic pain, urgency, vaginal bleeding and vaginal discharge.   Musculoskeletal: Negative for arthralgias, joint swelling and myalgias.   Skin: Negative for rash.   Neurological: Negative for  "dizziness, weakness, headaches and paresthesias.   Psychiatric/Behavioral: Positive for mood changes. The patient is nervous/anxious.           OBJECTIVE:   /71 (BP Location: Left arm, Patient Position: Chair, Cuff Size: Adult Large)   Pulse 75   Temp 97.7  F (36.5  C) (Temporal)   Resp 15   Ht 1.651 m (5' 5\")   Wt 90.3 kg (199 lb)   SpO2 95%   BMI 33.12 kg/m    Physical Exam  GENERAL: healthy, alert and no distress  EYES: Eyes grossly normal to inspection, conjunctivae and sclerae normal  HENT: ear canals and TM's normal, nose and mouth without ulcers or lesions  NECK: no adenopathy, no asymmetry, masses, or scars and thyroid normal to palpation  RESP: lungs clear to auscultation - no rales, rhonchi or wheezes  BREAST: normal without masses, tenderness or nipple discharge and no palpable axillary masses or adenopathy  CV: regular rate and rhythm, normal S1 S2  ABDOMEN: soft, nontender, no hepatosplenomegaly, no masses and bowel sounds normal  MS: no gross musculoskeletal defects noted, no edema  SKIN: no suspicious lesions or rashes  NEURO: Normal strength and tone, mentation intact and speech normal  PSYCH: mentation appears normal, affect normal/bright    Diagnostic Test Results:  Labs reviewed in Epic    ASSESSMENT/PLAN:     Routine general medical examination at a health care facility  - REVIEW OF HEALTH MAINTENANCE PROTOCOL ORDERS  - Up to date with hepatitis B vaccine  - Due to short staffing unable to have vaccinations done at clinic,will schedule shot only appt     NURY (generalized anxiety disorder)  - not controlled  - Mom previously was on Prozac which helped with symptoms  - Advised below  Stop zoloft 100 mg daily and the next day start prozac 40 mg daily. Please send me an update in 4 weeks if symptoms are not controlled.   - FLUoxetine (PROZAC) 40 MG capsule; Take 1 capsule (40 mg) by mouth daily  Dispense: 30 capsule; Refill: 1    Gastroesophageal reflux disease with esophagitis, " "unspecified whether hemorrhage  - controlled, continue current regimen   - famotidine (PEPCID) 40 MG tablet; Take 1 tablet (40 mg) by mouth daily  Dispense: 90 tablet; Refill: 3    Encounter for therapeutic drug monitoring  - CBC with platelets; Future  - Comprehensive metabolic panel (BMP + Alb, Alk Phos, ALT, AST, Total. Bili, TP); Future    Lipid screening  - Lipid panel reflex to direct LDL Non-fasting; Future    Screening for diabetes mellitus  - Hemoglobin A1c; Future          Patient has been advised of split billing requirements and indicates understanding: Yes      COUNSELING:  Reviewed preventive health counseling, as reflected in patient instructions    Estimated body mass index is 33.12 kg/m  as calculated from the following:    Height as of this encounter: 1.651 m (5' 5\").    Weight as of this encounter: 90.3 kg (199 lb).        She reports that she has never smoked. She has never used smokeless tobacco.      Counseling Resources:  ATP IV Guidelines  Pooled Cohorts Equation Calculator  Breast Cancer Risk Calculator  BRCA-Related Cancer Risk Assessment: FHS-7 Tool  FRAX Risk Assessment  ICSI Preventive Guidelines  Dietary Guidelines for Americans, 2010  USDA's MyPlate  ASA Prophylaxis  Lung CA Screening    Veean Figueroa MD  [facility]  "

## 2022-12-28 ENCOUNTER — VIRTUAL VISIT (OUTPATIENT)
Dept: FAMILY MEDICINE | Facility: CLINIC | Age: 27
End: 2022-12-28
Payer: COMMERCIAL

## 2022-12-28 DIAGNOSIS — E66.01 MORBID OBESITY (H): Primary | ICD-10-CM

## 2022-12-28 PROCEDURE — 99213 OFFICE O/P EST LOW 20 MIN: CPT | Mod: 95 | Performed by: FAMILY MEDICINE

## 2022-12-28 RX ORDER — PHENTERMINE HYDROCHLORIDE 37.5 MG/1
37.5 TABLET ORAL
Qty: 30 TABLET | Refills: 0 | Status: SHIPPED | OUTPATIENT
Start: 2022-12-28 | End: 2023-04-04

## 2022-12-28 RX ORDER — TOPIRAMATE 25 MG/1
TABLET, FILM COATED ORAL
Qty: 60 TABLET | Refills: 0 | Status: SHIPPED | OUTPATIENT
Start: 2022-12-28 | End: 2023-04-04

## 2022-12-28 NOTE — PROGRESS NOTES
Mervin is a 27 year old who is being evaluated via a billable video visit.      How would you like to obtain your AVS? MyChart  If the video visit is dropped, the invitation should be resent by: Send to e-mail at: [email or web address]  Will anyone else be joining your video visit? No         Assessment & Plan     Morbid obesity (H)  History of morbid obesity, patient reports she had gained over 50 pounds since 2 years ago.  She is very active, exercise daily, she watches her diet and her calories intake.  She has been seen by dietitians.  Continue to to be unable to lose weight.    She wondering if she can start Ozempic to help with the weight loss.  No history of diabetes.    Discussed with patient Ozempic could not be covered by her health insurance since she has no history of diabetes.  I did send a prescription for Ozempic.  It might need to have a prior authorization.  Discussed with patient if Ozempic, does not get approved through her insurance.  Then she could restart phentermine 1 tablet daily in the morning before breakfast, discussed possible side effect of the medication. Monitor heart rate and blood pressure.  In addition, Topamax at bedtime.  She need to follow-up with her primary care physician in 1 month's time.    - semaglutide (OZEMPIC) 2 MG/1.5ML SOPN pen; Inject 0.25 mg Subcutaneous every 7 days  - phentermine (ADIPEX-P) 37.5 MG tablet; Take 1 tablet (37.5 mg) by mouth every morning (before breakfast)  - topiramate (TOPAMAX) 25 MG tablet; One tab at bedtime for one wk, then 2 tab qhs      Subjective   Mervin is a 27 year old, presenting for the following health issues:  Family History Of (Diabetes/) and Medication Question (Ozempic)      HPI     Patient would like to discuss her family history of Diabetes and what she can do to avoid this.   Has dealt with some weight gain.         Review of Systems   Constitutional, HEENT, cardiovascular, pulmonary, GI, , musculoskeletal, neuro, skin, endocrine  and psych systems are negative, except as otherwise noted.      Objective           Vitals:  No vitals were obtained today due to virtual visit.    Physical Exam   GENERAL: Healthy, alert and no distress  EYES: Eyes grossly normal to inspection.  No discharge or erythema, or obvious scleral/conjunctival abnormalities.  RESP: No audible wheeze, cough, or visible cyanosis.  No visible retractions or increased work of breathing.    SKIN: Visible skin clear. No significant rash, abnormal pigmentation or lesions.  NEURO: Cranial nerves grossly intact.  Mentation and speech appropriate for age.  PSYCH: Mentation appears normal, affect normal/bright, judgement and insight intact, normal speech and appearance well-groomed.    No orders of the defined types were placed in this encounter.      Delvin Zhou MD.          Video-Visit Details    Type of service:  Video Visit     Originating Location (pt. Location): Home    Distant Location (provider location):  On-site  Platform used for Video Visit: [employer]

## 2022-12-29 ENCOUNTER — TELEPHONE (OUTPATIENT)
Dept: FAMILY MEDICINE | Facility: CLINIC | Age: 27
End: 2022-12-29

## 2022-12-29 ENCOUNTER — NON-VISIT BILLABLE ENCOUNTER (OUTPATIENT)
Dept: FAMILY MEDICINE | Facility: CLINIC | Age: 27
End: 2022-12-29

## 2022-12-29 NOTE — TELEPHONE ENCOUNTER
Central Prior Authorization Team   Phone: [phone]      PRIOR AUTHORIZATION DENIED    Medication: semaglutide (OZEMPIC) 2 MG/1.5ML SOPN pen - EPA DENIED    Denial Date: 12/28/2022    Denial Rational:       Appeal Information:

## 2023-01-02 DIAGNOSIS — F41.1 GAD (GENERALIZED ANXIETY DISORDER): ICD-10-CM

## 2023-01-03 RX ORDER — FLUOXETINE 40 MG/1
CAPSULE ORAL
Qty: 30 CAPSULE | Refills: 2 | Status: SHIPPED | OUTPATIENT
Start: 2023-01-03 | End: 2023-04-25

## 2023-01-03 NOTE — TELEPHONE ENCOUNTER
fluoxetine Refilled per [facility] refill protocol    Maria Elena Bermudez BSN RN  [facility]

## 2023-01-19 NOTE — TELEPHONE ENCOUNTER
See message below- if you are wanting to appeal, they need a letter of medical necessity and route back to PA team.      Nenita Chavez MA

## 2023-02-01 ENCOUNTER — E-VISIT (OUTPATIENT)
Dept: FAMILY MEDICINE | Facility: CLINIC | Age: 28
End: 2023-02-01
Payer: COMMERCIAL

## 2023-02-01 DIAGNOSIS — Z32.01 PREGNANCY TEST POSITIVE: Primary | ICD-10-CM

## 2023-02-01 PROCEDURE — 99421 OL DIG E/M SVC 5-10 MIN: CPT | Performed by: FAMILY MEDICINE

## 2023-03-13 ENCOUNTER — TELEPHONE (OUTPATIENT)
Dept: FAMILY MEDICINE | Facility: CLINIC | Age: 28
End: 2023-03-13

## 2023-03-13 NOTE — TELEPHONE ENCOUNTER
[facility] faxed a refill request for    phentermine (ADIPEX-P) 37.5 MG tablet      Last Written Prescription Date:  12/28/2022  Last Fill Quantity: 30 tablet,   # refills: 0  Last Office Visit: 12/28/2022 BRANDON Todd    Future Office visit:       Routing refill request to provider for review/approval because:  Drug not on the [facility], P or [facility] refill protocol or controlled substance

## 2023-03-13 NOTE — TELEPHONE ENCOUNTER
Please forward to her PCP.  Otherwise, need to follow up in person, to check vitals sign and monitor weight    thanks

## 2023-03-30 ENCOUNTER — MYC REFILL (OUTPATIENT)
Dept: FAMILY MEDICINE | Facility: CLINIC | Age: 28
End: 2023-03-30
Payer: COMMERCIAL

## 2023-03-30 DIAGNOSIS — E66.01 MORBID OBESITY (H): ICD-10-CM

## 2023-03-31 RX ORDER — PHENTERMINE HYDROCHLORIDE 37.5 MG/1
37.5 TABLET ORAL
Qty: 30 TABLET | Refills: 0 | OUTPATIENT
Start: 2023-03-31

## 2023-03-31 RX ORDER — TOPIRAMATE 25 MG/1
TABLET, FILM COATED ORAL
Qty: 60 TABLET | Refills: 0 | OUTPATIENT
Start: 2023-03-31

## 2023-03-31 NOTE — TELEPHONE ENCOUNTER
Cristy message sent to patient.  Norma SALAZAR RN  [facility]

## 2023-03-31 NOTE — TELEPHONE ENCOUNTER
Dr. Figueroa: you have not prescribed this, what type of visit for follow up would you want to continue phentermine for patient?  Last visit was 10/31/22 for annual.    Norma SALAZAR RN  [facility]

## 2023-03-31 NOTE — TELEPHONE ENCOUNTER
This been denies many times, and keep coming to me  I had only one virtual visit with this pt  She been told need to follow up with her PCP for more refills.

## 2023-04-02 ENCOUNTER — APPOINTMENT (OUTPATIENT)
Dept: FAMILY MEDICINE | Facility: CLINIC | Age: 28
End: 2023-04-02
Payer: COMMERCIAL

## 2023-04-04 ENCOUNTER — E-VISIT (OUTPATIENT)
Dept: FAMILY MEDICINE | Facility: CLINIC | Age: 28
End: 2023-04-04

## 2023-04-04 DIAGNOSIS — E66.01 MORBID OBESITY (H): ICD-10-CM

## 2023-04-04 PROCEDURE — 99421 OL DIG E/M SVC 5-10 MIN: CPT | Performed by: FAMILY MEDICINE

## 2023-04-04 RX ORDER — PHENTERMINE HYDROCHLORIDE 37.5 MG/1
37.5 TABLET ORAL
Qty: 30 TABLET | Refills: 0 | Status: SHIPPED | OUTPATIENT
Start: 2023-04-04 | End: 2023-05-17

## 2023-04-04 RX ORDER — TOPIRAMATE 25 MG/1
TABLET, FILM COATED ORAL
Qty: 60 TABLET | Refills: 0 | Status: SHIPPED | OUTPATIENT
Start: 2023-04-04 | End: 2023-05-30

## 2023-04-25 DIAGNOSIS — F41.1 GAD (GENERALIZED ANXIETY DISORDER): ICD-10-CM

## 2023-04-26 RX ORDER — FLUOXETINE 40 MG/1
40 CAPSULE ORAL DAILY
Qty: 30 CAPSULE | Refills: 2 | Status: SHIPPED | OUTPATIENT
Start: 2023-04-26 | End: 2024-06-14

## 2023-04-26 NOTE — TELEPHONE ENCOUNTER
Prescription approved per [facility] Refill Protocol.  CHERYLE CheneyN, RN-[facility]

## 2023-05-16 DIAGNOSIS — E66.01 MORBID OBESITY (H): ICD-10-CM

## 2023-05-17 RX ORDER — PHENTERMINE HYDROCHLORIDE 37.5 MG/1
TABLET ORAL
Qty: 30 TABLET | Refills: 0 | Status: SHIPPED | OUTPATIENT
Start: 2023-05-17 | End: 2023-05-30

## 2023-05-22 ENCOUNTER — TELEPHONE (OUTPATIENT)
Dept: FAMILY MEDICINE | Facility: CLINIC | Age: 28
End: 2023-05-22

## 2023-05-22 NOTE — TELEPHONE ENCOUNTER
Prior Authorization Retail Medication Request    Medication/Dose: semaglutide (OZEMPIC) 2 MG/1.5ML SOPN pen   ICD code (if different than what is on RX):  NA   Previously Tried and Failed:  NA  Rationale:  NA    Insurance Name:  NA   Insurance ID:  [id number]      Pharmacy Information (if different than what is on RX)  Name:  Same  Phone:  Same    Pharmacy Message:   Plan does not cover this medication. Please call plan at [phone] to initiate prior auth or call/fax pharmacy to change medication. Patient ID is [id number]

## 2023-05-25 NOTE — PROGRESS NOTES
PLASTIC SURGERY HISTORY AND PHYSICAL    Chief Complaint:Hypertrophy of breasts   Referring Provider: No ref. provider found      HPI:Mervin is a 28 year old female who presents with symptomatic macromastia. She currently wears a F, would prefer to be a B. She complains of back pain, neck pain and shoulder pain for 10 years. Difficult to workout because of large breasts, had to quite team sports because of large breasts. Gets bruising under breasts from underwire. She has tried the following without successful resolution of pain symptoms: chiropractor, cold chamber therapy, wearing multiple bras, buying specialty bras. + family history of breast cancer-maternal aunt diagnosed at 57. No personal breast concerns. Has 2 year old toddler, does not plan on any more children. He is adopted.     Recent mammogram: no  Rashes: yes, 3-4 years. Does not use any products. Doesn't itch, just appears irritated.     Was taking phentermine and topamax for about 3 months for weight loss. Had good success, lost 30lbs, needs to get EKG before continuing. Sees PCP for this.     PMH:   Past Medical History:   Diagnosis Date     Anxiety Age 22     Eating disorder     restrictive and then binging     Esophageal reflux Age 8    Didnt notice as i aged until 23     Gastroparesis      Genital problems Age 22    Genito pelvic pain     GERD (gastroesophageal reflux disease)     esophageal polyp   eating disorder resolved    PSH:   Past Surgical History:   Procedure Laterality Date     APPENDECTOMY       ENT SURGERY  2011   tonsillectomy    FH:   Family History   Problem Relation Age of Onset     Obesity Mother      Velasquez's esophagus Mother      Other - See Comments Mother         eosinophilic esophagitis     Hypertension Father      Hyperlipidemia Father      Diabetes Father      Anxiety Disorder Father      Stomach Cancer Maternal Grandmother      Breast Cancer Maternal Aunt      Diabetes Paternal Grandfather      Diabetes Paternal  "Grandmother      Other Cancer Paternal Grandmother         Stomach cancer     Anxiety Disorder Sister      Breast Cancer Other        SH:   Social History     Tobacco Use     Smoking status: Never     Smokeless tobacco: Never   Vaping Use     Vaping status: Never Used   Substance Use Topics     Alcohol use: Yes     Comment: less than 1x a week     Drug use: No      Work/school: therapist.    MEDS:     Current Outpatient Medications:      famotidine (PEPCID) 40 MG tablet, Take 1 tablet (40 mg) by mouth daily, Disp: 90 tablet, Rfl: 3     FLUoxetine (PROZAC) 40 MG capsule, Take 1 capsule (40 mg) by mouth daily, Disp: 30 capsule, Rfl: 2     propranolol (INDERAL) 10 MG tablet, Take 1 tablet (10 mg) by mouth daily as needed (anxiety), Disp: 90 tablet, Rfl: 0  No current facility-administered medications for this visit.    Facility-Administered Medications Ordered in Other Visits:      barium sulfate 40% (VARIBAR THIN HONEY) oral suspension 20 mL, 20 mL, Oral, Once, Sriram Littlejohn MD       ALLERGIES:   No Known Allergies     ROS: Denies chest pain, shortness of breath, MI, CVA, DVT, PE, and bleeding disorders.     PHYSICAL EXAMINATION:   /74   Pulse 72   Ht 1.651 m (5' 5\")   Wt 80.7 kg (177 lb 14.6 oz)   SpO2 97%   BMI 29.61 kg/m     BMI: Body mass index is 29.61 kg/m .  Body surface area is 1.92 meters squared.   General: NAD  Chest: bilateral macromastia with grade II ptosis. More fatty tissue than fibrous. No active rashes. <40cm nipple to notch.   +mild shoulder grooving    527g     ASSESSMENT: 27 yo F PMH GERD, anxiety, presents with symptomatic macromastia desiring breast reduction.     PLAN:   I believe she is a reasonable candidate for a breast reduction. Prior to surgery she will need mammogram. According to Schnur scale 527g will need to be removed, this will be approximately 90% reduction from her current size. She understands that this amount will need to be removed to be covered by " insurance. I do think this is a large portion of breast and may be difficult to obtain and may leave her smaller than desired. We did discuss the role of weight loss and how that will lower schnur number. She is interested in losing ~20 more lbs. She would like to restart the meds as they worked quite well for her.     Risks and benefits of the procedure were discussed, including but not limited to bleeding, infection, scarring, wound healing complications, asymmetry, loss of nipple sensation, nipple necrosis, inability to breast feed and change in size of breasts in the future.     She will work on weight loss and return for a consult when she is near her goal weight. We look forward to helping her in the future.     Christie Camarena PA-C  Plastic and Reconstructive Surgery     45 minutes spent on the date of the encounter doing chart review, history and physical, documentation and further activity as noted above.

## 2023-05-25 NOTE — TELEPHONE ENCOUNTER
FUTURE VISIT INFORMATION      FUTURE VISIT INFORMATION:    Date: 5/30/2023    Time: Noon    Location: [facility]  REFERRAL INFORMATION:    Referring provider: N/A    Referring providers clinic: N/A    Reason for visit/diagnosis: BRM Consult    RECORDS REQUESTED FROM:       Clinic name Comments Records Status Imaging Status   [facility] 12/28/22 - [facility] OV with Dr. Winnie Mccormack    [facility] 7/27/20 - [facility] OV with Dr. Henry Mccormack

## 2023-05-26 NOTE — TELEPHONE ENCOUNTER
PRIOR AUTHORIZATION DENIED    Medication: OZEMPIC (0.25 OR 0.5 MG/DOSE) 2 [facility]  Insurance Company: [employer] - Phone [phone] Fax [phone]  Denial Date: 5/26/2023  Denial Rational: MEDICATION IS NOT COVERED FOR DX - only covered for DMII  POSSIBLE ALTERNATIVES: Saxenda or Wegovy        Appeal Information: N/A  Patient Notified: No

## 2023-05-26 NOTE — TELEPHONE ENCOUNTER
PA Initiation    Medication: OZEMPIC (0.25 OR 0.5 MG/DOSE) 2 MG/3ML SC [facility]  Insurance Company: [employer] - Phone [phone] Fax [phone]  Pharm[facility]illing the Rx[id number] [place] [id number] AT [facility] & [address] [place] [address]  Filling Pharmacy Phone: [phone]  Filling Pharmacy Fax:    Start Date: 5/26/2023

## 2023-05-30 ENCOUNTER — OFFICE VISIT (OUTPATIENT)
Dept: PLASTIC SURGERY | Facility: CLINIC | Age: 28
End: 2023-05-30
Payer: COMMERCIAL

## 2023-05-30 ENCOUNTER — PRE VISIT (OUTPATIENT)
Dept: PLASTIC SURGERY | Facility: CLINIC | Age: 28
End: 2023-05-30

## 2023-05-30 VITALS
DIASTOLIC BLOOD PRESSURE: 74 MMHG | HEART RATE: 72 BPM | BODY MASS INDEX: 29.64 KG/M2 | HEIGHT: 65 IN | SYSTOLIC BLOOD PRESSURE: 117 MMHG | OXYGEN SATURATION: 97 % | WEIGHT: 177.91 LBS

## 2023-05-30 DIAGNOSIS — N62 MACROMASTIA: Primary | ICD-10-CM

## 2023-05-30 PROCEDURE — 99204 OFFICE O/P NEW MOD 45 MIN: CPT | Performed by: PHYSICIAN ASSISTANT

## 2023-05-30 ASSESSMENT — PAIN SCALES - GENERAL: PAINLEVEL: NO PAIN (0)

## 2023-05-30 NOTE — LETTER
5/30/2023       RE: Mervin Echevarria  [address]  [facility] [place] [address]       Dear Colleague,    Thank you for referring your patient, Mervin Echevarria, to the [facility] [place] [facility]. Please see a copy of my visit note below.    PLASTIC SURGERY HISTORY AND PHYSICAL    Chief Complaint:Hypertrophy of breasts   Referring Provider: No ref. provider found      HPI:Mervin is a 28 year old female who presents with symptomatic macromastia. She currently wears a F, would prefer to be a B. She complains of back pain, neck pain and shoulder pain for 10 years. Difficult to workout because of large breasts, had to quite team sports because of large breasts. Gets bruising under breasts from underwire. She has tried the following without successful resolution of pain symptoms: chiropractor, cold chamber therapy, wearing multiple bras, buying specialty bras. + family history of breast cancer-maternal aunt diagnosed at 57. No personal breast concerns. Has 2 year old toddler, does not plan on any more children. He is adopted.     Recent mammogram: no  Rashes: yes, 3-4 years. Does not use any products. Doesn't itch, just appears irritated.     Was taking phentermine and topamax for about 3 months for weight loss. Had good success, lost 30lbs, needs to get EKG before continuing. Sees PCP for this.     PMH:   Past Medical History:   Diagnosis Date    Anxiety Age 22    Eating disorder     restrictive and then binging    Esophageal reflux Age 8    Didnt notice as i aged until 23    Gastroparesis     Genital problems Age 22    Genito pelvic pain    GERD (gastroesophageal reflux disease)     esophageal polyp   eating disorder resolved    PSH:   Past Surgical History:   Procedure Laterality Date    APPENDECTOMY      ENT SURGERY  2011   tonsillectomy    FH:   Family History   Problem Relation Age of Onset     "Obesity Mother     Velasquez's esophagus Mother     Other - See Comments Mother         eosinophilic esophagitis    Hypertension Father     Hyperlipidemia Father     Diabetes Father     Anxiety Disorder Father     Stomach Cancer Maternal Grandmother     Breast Cancer Maternal Aunt     Diabetes Paternal Grandfather     Diabetes Paternal Grandmother     Other Cancer Paternal Grandmother         Stomach cancer    Anxiety Disorder Sister     Breast Cancer Other        SH:   Social History     Tobacco Use    Smoking status: Never    Smokeless tobacco: Never   Vaping Use    Vaping status: Never Used   Substance Use Topics    Alcohol use: Yes     Comment: less than 1x a week    Drug use: No      Work/school: therapist.    MEDS:     Current Outpatient Medications:     famotidine (PEPCID) 40 MG tablet, Take 1 tablet (40 mg) by mouth daily, Disp: 90 tablet, Rfl: 3    FLUoxetine (PROZAC) 40 MG capsule, Take 1 capsule (40 mg) by mouth daily, Disp: 30 capsule, Rfl: 2    propranolol (INDERAL) 10 MG tablet, Take 1 tablet (10 mg) by mouth daily as needed (anxiety), Disp: 90 tablet, Rfl: 0  No current facility-administered medications for this visit.    Facility-Administered Medications Ordered in Other Visits:     barium sulfate 40% (VARIBAR THIN HONEY) oral suspension 20 mL, 20 mL, Oral, Once, Sriram Littlejohn MD       ALLERGIES:   No Known Allergies     ROS: Denies chest pain, shortness of breath, MI, CVA, DVT, PE, and bleeding disorders.     PHYSICAL EXAMINATION:   /74   Pulse 72   Ht 1.651 m (5' 5\")   Wt 80.7 kg (177 lb 14.6 oz)   SpO2 97%   BMI 29.61 kg/m     BMI: Body mass index is 29.61 kg/m .  Body surface area is 1.92 meters squared.   General: NAD  Chest: bilateral macromastia with grade II ptosis. More fatty tissue than fibrous. No active rashes. <40cm nipple to notch.   +mild shoulder grooving    527g     ASSESSMENT: 27 yo F PMH GERD, anxiety, presents with symptomatic macromastia desiring breast " reduction.     PLAN:   I believe she is a reasonable candidate for a breast reduction. Prior to surgery she will need mammogram. According to Schnur scale 527g will need to be removed, this will be approximately 90% reduction from her current size. She understands that this amount will need to be removed to be covered by insurance. I do think this is a large portion of breast and may be difficult to obtain and may leave her smaller than desired. We did discuss the role of weight loss and how that will lower schnur number. She is interested in losing ~20 more lbs. She would like to restart the meds as they worked quite well for her.     Risks and benefits of the procedure were discussed, including but not limited to bleeding, infection, scarring, wound healing complications, asymmetry, loss of nipple sensation, nipple necrosis, inability to breast feed and change in size of breasts in the future.     She will work on weight loss and return for a consult when she is near her goal weight. We look forward to helping her in the future.      45 minutes spent on the date of the encounter doing chart review, history and physical, documentation and further activity as noted above.        Again, thank you for allowing me to participate in the care of your patient.      Sincerely,    Christie Camarena PA-C

## 2023-05-30 NOTE — NURSING NOTE
"Chief Complaint   Patient presents with     Consult     Brm       Vitals:    05/30/23 1212   BP: 117/74   Pulse: 72   SpO2: 97%   Weight: 80.7 kg (177 lb 14.6 oz)   Height: 1.651 m (5' 5\")       Body mass index is 29.61 kg/m .          ELYSSA COATES EMT    "

## 2023-05-31 ENCOUNTER — VIRTUAL VISIT (OUTPATIENT)
Dept: FAMILY MEDICINE | Facility: CLINIC | Age: 28
End: 2023-05-31
Payer: COMMERCIAL

## 2023-05-31 DIAGNOSIS — E66.01 MORBID OBESITY (H): Primary | ICD-10-CM

## 2023-05-31 PROCEDURE — 99213 OFFICE O/P EST LOW 20 MIN: CPT | Mod: VID | Performed by: FAMILY MEDICINE

## 2023-05-31 RX ORDER — PHENTERMINE HYDROCHLORIDE 37.5 MG/1
37.5 TABLET ORAL
Qty: 90 TABLET | Refills: 1 | Status: SHIPPED | OUTPATIENT
Start: 2023-05-31 | End: 2024-06-14

## 2023-05-31 NOTE — PROGRESS NOTES
"Mervin is a 28 year old who is being evaluated via a billable video visit.      How would you like to obtain your AVS? MyChart  If the video visit is dropped, the invitation should be resent by: Send to e-mail at: [email or web address]  Will anyone else be joining your video visit? No          Assessment & Plan     Morbid obesity (H)  Advised patient to continue with diet, exercise.    She has done well with current medication, she lost almost 20 pounds.  No side effect.  - phentermine (ADIPEX-P) 37.5 MG tablet; Take 1 tablet (37.5 mg) by mouth every morning (before breakfast)     BMI:   Estimated body mass index is 29.61 kg/m  as calculated from the following:    Height as of 5/30/23: 1.651 m (5' 5\").    Weight as of 5/30/23: 80.7 kg (177 lb 14.6 oz).   Weight management plan: Discussed healthy diet and exercise guidelines    Subjective   Mervin is a 28 year old, presenting for the following health issues:  Refill Request  Patient has done well overall with current medication, she has no side effects.    She has lost over 20 pounds.  Denies depression, suicidal/ideation, no alcohol abuse or drug abuse.        5/31/2023     5:14 PM   Additional Questions   Roomed by Charley CHRISTIANSEN MA     History of Present Illness       Reason for visit:  Weight management    She eats 2-3 servings of fruits and vegetables daily.She consumes 1 sweetened beverage(s) daily.She exercises with enough effort to increase her heart rate 30 to 60 minutes per day.  She exercises with enough effort to increase her heart rate 5 days per week. She is missing 1 dose(s) of medications per week.  She is not taking prescribed medications regularly due to remembering to take.       Review of Systems   Constitutional, HEENT, cardiovascular, pulmonary, gi and gu systems are negative, except as otherwise noted.      Objective    Vitals - Patient Reported  Weight (Patient Reported): 80.3 kg (177 lb)        Physical Exam   GENERAL: Healthy, alert and no " distress  EYES: Eyes grossly normal to inspection.  No discharge or erythema, or obvious scleral/conjunctival abnormalities.  RESP: No audible wheeze, cough, or visible cyanosis.  No visible retractions or increased work of breathing.    SKIN: Visible skin clear. No significant rash, abnormal pigmentation or lesions.  NEURO: Cranial nerves grossly intact.  Mentation and speech appropriate for age.  PSYCH: Mentation appears normal, affect normal/bright, judgement and insight intact, normal speech and appearance well-groomed.        Delvin Zhou MD        Video-Visit Details    Type of service:  Video Visit     Originating Location (pt. Location): Home    Distant Location (provider location):  On-site  Platform used for Video Visit: [employer] [place]

## 2023-10-10 ENCOUNTER — MYC REFILL (OUTPATIENT)
Dept: FAMILY MEDICINE | Facility: CLINIC | Age: 28
End: 2023-10-10
Payer: COMMERCIAL

## 2023-10-10 DIAGNOSIS — F41.0 PANIC ATTACK: ICD-10-CM

## 2023-10-10 RX ORDER — PROPRANOLOL HYDROCHLORIDE 10 MG/1
10 TABLET ORAL DAILY PRN
Qty: 90 TABLET | Refills: 0 | Status: SHIPPED | OUTPATIENT
Start: 2023-10-10 | End: 2024-01-23

## 2023-12-16 ENCOUNTER — HEALTH MAINTENANCE LETTER (OUTPATIENT)
Age: 28
End: 2023-12-16

## 2024-01-22 DIAGNOSIS — F41.0 PANIC ATTACK: ICD-10-CM

## 2024-01-23 RX ORDER — PROPRANOLOL HYDROCHLORIDE 10 MG/1
TABLET ORAL
Qty: 90 TABLET | Refills: 0 | Status: SHIPPED | OUTPATIENT
Start: 2024-01-23 | End: 2024-08-30

## 2024-03-20 ENCOUNTER — TELEPHONE (OUTPATIENT)
Dept: DERMATOLOGY | Facility: CLINIC | Age: 29
End: 2024-03-20
Payer: COMMERCIAL

## 2024-03-20 NOTE — TELEPHONE ENCOUNTER
Left Voicemail (1st Attempt) and Sent Mychart (1st Attempt) for the patient to call back and schedule the following:    Appointment type: New   Provider: Selin Potter  Return date: 7/01/24  Specialty phone number: [phone]

## 2024-06-11 ENCOUNTER — MYC MEDICAL ADVICE (OUTPATIENT)
Dept: FAMILY MEDICINE | Facility: CLINIC | Age: 29
End: 2024-06-11
Payer: COMMERCIAL

## 2024-06-14 ENCOUNTER — OFFICE VISIT (OUTPATIENT)
Dept: FAMILY MEDICINE | Facility: CLINIC | Age: 29
End: 2024-06-14
Payer: COMMERCIAL

## 2024-06-14 VITALS
TEMPERATURE: 97.4 F | DIASTOLIC BLOOD PRESSURE: 74 MMHG | SYSTOLIC BLOOD PRESSURE: 119 MMHG | HEIGHT: 64 IN | BODY MASS INDEX: 29.88 KG/M2 | WEIGHT: 175 LBS | OXYGEN SATURATION: 98 % | RESPIRATION RATE: 15 BRPM | HEART RATE: 78 BPM

## 2024-06-14 DIAGNOSIS — Z51.81 ENCOUNTER FOR THERAPEUTIC DRUG MONITORING: ICD-10-CM

## 2024-06-14 DIAGNOSIS — Z13.1 SCREENING FOR DIABETES MELLITUS: ICD-10-CM

## 2024-06-14 DIAGNOSIS — Z01.818 PREOP GENERAL PHYSICAL EXAM: ICD-10-CM

## 2024-06-14 DIAGNOSIS — C43.9 MELANOMA OF SKIN (H): ICD-10-CM

## 2024-06-14 DIAGNOSIS — Z13.220 LIPID SCREENING: ICD-10-CM

## 2024-06-14 DIAGNOSIS — Z13.29 SCREENING FOR THYROID DISORDER: ICD-10-CM

## 2024-06-14 DIAGNOSIS — Z83.2 FAMILY HISTORY OF FACTOR V LEIDEN MUTATION: ICD-10-CM

## 2024-06-14 LAB
ERYTHROCYTE [DISTWIDTH] IN BLOOD BY AUTOMATED COUNT: 12.7 % (ref 10–15)
FACTOR V INTERPRETATION: ABNORMAL
HBA1C MFR BLD: 5.3 % (ref 0–5.6)
HCT VFR BLD AUTO: 43.3 % (ref 35–47)
HGB BLD-MCNC: 14.5 G/DL (ref 11.7–15.7)
LAB DIRECTOR COMMENTS: ABNORMAL
LAB DIRECTOR DISCLAIMER: ABNORMAL
LAB DIRECTOR INTERPRETATION: ABNORMAL
LAB DIRECTOR METHODOLOGY: ABNORMAL
LAB DIRECTOR RESULTS: ABNORMAL
LOCATION OF TASK: ABNORMAL
MCH RBC QN AUTO: 28.5 PG (ref 26.5–33)
MCHC RBC AUTO-ENTMCNC: 33.5 G/DL (ref 31.5–36.5)
MCV RBC AUTO: 85 FL (ref 78–100)
PLATELET # BLD AUTO: 319 10E3/UL (ref 150–450)
RBC # BLD AUTO: 5.08 10E6/UL (ref 3.8–5.2)
SPECIMEN DESCRIPTION: ABNORMAL
WBC # BLD AUTO: 11.1 10E3/UL (ref 4–11)

## 2024-06-14 PROCEDURE — 80061 LIPID PANEL: CPT | Performed by: FAMILY MEDICINE

## 2024-06-14 PROCEDURE — 81241 F5 GENE: CPT | Performed by: FAMILY MEDICINE

## 2024-06-14 PROCEDURE — G2211 COMPLEX E/M VISIT ADD ON: HCPCS | Performed by: FAMILY MEDICINE

## 2024-06-14 PROCEDURE — 99214 OFFICE O/P EST MOD 30 MIN: CPT | Performed by: FAMILY MEDICINE

## 2024-06-14 PROCEDURE — 83036 HEMOGLOBIN GLYCOSYLATED A1C: CPT | Performed by: FAMILY MEDICINE

## 2024-06-14 PROCEDURE — G0452 MOLECULAR PATHOLOGY INTERPR: HCPCS | Performed by: PATHOLOGY

## 2024-06-14 PROCEDURE — 36415 COLL VENOUS BLD VENIPUNCTURE: CPT | Performed by: FAMILY MEDICINE

## 2024-06-14 PROCEDURE — 84443 ASSAY THYROID STIM HORMONE: CPT | Performed by: FAMILY MEDICINE

## 2024-06-14 PROCEDURE — 80053 COMPREHEN METABOLIC PANEL: CPT | Performed by: FAMILY MEDICINE

## 2024-06-14 PROCEDURE — 85027 COMPLETE CBC AUTOMATED: CPT | Performed by: FAMILY MEDICINE

## 2024-06-14 SDOH — HEALTH STABILITY: PHYSICAL HEALTH: ON AVERAGE, HOW MANY DAYS PER WEEK DO YOU ENGAGE IN MODERATE TO STRENUOUS EXERCISE (LIKE A BRISK WALK)?: 2 DAYS

## 2024-06-14 ASSESSMENT — SOCIAL DETERMINANTS OF HEALTH (SDOH): HOW OFTEN DO YOU GET TOGETHER WITH FRIENDS OR RELATIVES?: THREE TIMES A WEEK

## 2024-06-14 NOTE — PROGRESS NOTES
Preoperative Evaluation  [address]  [address]  [place] [address]  Phone: [phone]  Fax: [phone]  Primary Provider: Veena Figueroa MD  Pre-op Performing Provider: Veena Figueroa MD  Jun 14, 2024 6/14/2024   Surgical Information   What procedure is being done? Excision Right Upper Forehead Melanoma with Flap Closure and Biopsy   Facility or Hospital where procedure/surgery will be performed: [facility]   Who is doing the procedure / surgery? Dr. Guan   Date of surgery / procedure: 6/25/24   Time of surgery / procedure: 10:30am   Where do you plan to recover after surgery? at home with family     Fax number for surgical facility: [phone]    Assessment & Plan     The proposed surgical procedure is considered LOW risk.    Preop general physical exam  - CBC with platelets; Future    Melanoma of skin (H)    Screening for thyroid disorder  - TSH with free T4 reflex; Future  - TSH with free T4 reflex    Encounter for therapeutic drug monitoring  - CBC with platelets; Future  - Comprehensive metabolic panel (BMP + Alb, Alk Phos, ALT, AST, Total. Bili, TP); Future  - CBC with platelets  - Comprehensive metabolic panel (BMP + Alb, Alk Phos, ALT, AST, Total. Bili, TP)    Lipid screening  - Lipid panel reflex to direct LDL Non-fasting; Future  - Lipid panel reflex to direct LDL Non-fasting    Screening for diabetes mellitus  - Hemoglobin A1c; Future  - Hemoglobin A1c    Family history of factor V Leiden mutation  - Factor 5 leiden mutation analysis; Future  - Factor 5 leiden mutation analysis        - No identified additional risk factors other than previously addressed    Preoperative Medication Instructions  Antiplatelet or Anticoagulation Medication Instructions   - Patient is on no antiplatelet or anticoagulation medications.    Additional Medication Instructions  Take all scheduled medications on the day of surgery  EXCEPT for modifications listed below:   - Beta Blockers: Continue taking on the day of surgery.    Recommendation  Approval given to proceed with proposed procedure, without further diagnostic evaluation.    The longitudinal plan of care for the diagnosis(es)/condition(s) as documented were addressed during this visit. Due to the added complexity in care, I will continue to support Mervin in the subsequent management and with ongoing continuity of care.     Sobia Jeffries is a 29 year old, presenting for the following:  Pre-Op Exam (Pre Op - 6/25/24)          6/14/2024     3:28 PM   Additional Questions   Roomed by Viviana MCCARTHY related to upcoming procedure: melanoma         6/14/2024   Pre-Op Questionnaire   Have you ever had a heart attack or stroke? No   Have you ever had surgery on your heart or blood vessels, such as a stent placement, a coronary artery bypass, or surgery on an artery in your head, neck, heart, or legs? No   Do you have chest pain with activity? No   Do you have a history of heart failure? No   Do you currently have a cold, bronchitis or symptoms of other infection? No   Do you have a cough, shortness of breath, or wheezing? No   Do you or anyone in your family have previous history of blood clots? (!) YES - Mom and sister have factor 5 leiden    Do you or does anyone in your family have a serious bleeding problem such as prolonged bleeding following surgeries or cuts? No   Have you ever had problems with anemia or been told to take iron pills? No   Have you had any abnormal blood loss such as black, tarry or bloody stools, or abnormal vaginal bleeding? No   Have you ever had a blood transfusion? No   Are you willing to have a blood transfusion if it is medically needed before, during, or after your surgery? Yes   Have you or any of your relatives ever had problems with anesthesia? No   Do you have sleep apnea, excessive snoring or daytime drowsiness? No   Do you have any artifical  heart valves or other implanted medical devices like a pacemaker, defibrillator, or continuous glucose monitor? No   Do you have artificial joints? No   Are you allergic to latex? No     Health Care Directive  Patient does not have a Health Care Directive or Living Will: Advance Directive received and scanned. Click on Code in the patient header to view.    Preoperative Review of    reviewed - no record of controlled substances prescribed.      Status of Chronic Conditions:  See problem list for active medical problems.  Problems all longstanding and stable, except as noted/documented.  See ROS for pertinent symptoms related to these conditions.    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     Priority: Medium      Past Medical History:   Diagnosis Date    Anxiety Age 22    Eating disorder     restrictive and then binging    Esophageal reflux Age 8    Didnt notice as i aged until 23    Gastroparesis     Genital problems Age 22    Genito pelvic pain    GERD (gastroesophageal reflux disease)     esophageal polyp     Past Surgical History:   Procedure Laterality Date    APPENDECTOMY      4/2002    ENT SURGERY  2011    tonsils     Current Outpatient Medications   Medication Sig Dispense Refill    famotidine (PEPCID) 40 MG tablet Take 1 tablet (40 mg) by mouth daily 90 tablet 3    propranolol (INDERAL) 10 MG tablet TAKE 1 TABLET(10 MG) BY MOUTH DAILY AS NEEDED FOR ANXIETY 90 tablet 0     No current  "facility-administered medications for this visit.     Facility-Administered Medications Ordered in Other Visits   Medication Dose Route Frequency Provider Last Rate Last Admin    barium sulfate 40% (VARIBAR THIN HONEY) oral suspension 20 mL  20 mL Oral Once Sriram Littlejohn MD            No Known Allergies     Social History     Tobacco Use    Smoking status: Never    Smokeless tobacco: Never   Substance Use Topics    Alcohol use: Yes     Comment: less than 1x a week     Family History   Problem Relation Age of Onset    Obesity Mother     Velasquez's esophagus Mother     Other - See Comments Mother         eosinophilic esophagitis    Hypertension Father     Hyperlipidemia Father     Diabetes Father     Anxiety Disorder Father     Stomach Cancer Maternal Grandmother     Breast Cancer Maternal Aunt     Diabetes Paternal Grandfather     Diabetes Paternal Grandmother     Other Cancer Paternal Grandmother         Stomach cancer    Anxiety Disorder Sister     Breast Cancer Other      History   Drug Use No               Objective    There were no vitals taken for this visit.   Estimated body mass index is 29.61 kg/m  as calculated from the following:    Height as of 5/30/23: 1.651 m (5' 5\").    Weight as of 5/30/23: 80.7 kg (177 lb 14.6 oz).  Physical Exam  /74 (BP Location: Right arm, Patient Position: Sitting, Cuff Size: Adult Large)   Pulse 78   Temp 97.4  F (36.3  C) (Temporal)   Resp 15   Ht 1.63 m (5' 4.17\")   Wt 79.4 kg (175 lb)   SpO2 98%   BMI 29.88 kg/m     GENERAL: alert and no distress  EYES: Eyes grossly normal to inspection  HENT: ear canals and TM's normal  NECK: no adenopathy, no asymmetry, masses, or scars  RESP: lungs clear to auscultation - no rales, rhonchi or wheezes  CV: regular rate and rhythm, normal S1 S2  ABDOMEN: soft, nontender, no hepatosplenomegaly, no masses and bowel sounds normal  MS: no gross musculoskeletal defects noted, no edema  SKIN: no suspicious lesions or " rashes  NEURO: Normal strength and tone, mentation intact and speech normal  PSYCH: mentation appears normal, affect steve;      Diagnostics  Recent Results (from the past 168 hour(s))   Lipid panel reflex to direct LDL Non-fasting    Collection Time: 06/14/24  4:07 PM   Result Value Ref Range    Cholesterol 177 <200 mg/dL    Triglycerides 128 <150 mg/dL    Direct Measure HDL 62 >=50 mg/dL    LDL Cholesterol Calculated 89 <=100 mg/dL    Non HDL Cholesterol 115 <130 mg/dL    Patient Fasting > 8hrs? No    CBC with platelets    Collection Time: 06/14/24  4:07 PM   Result Value Ref Range    WBC Count 11.1 (H) 4.0 - 11.0 10e3/uL    RBC Count 5.08 3.80 - 5.20 10e6/uL    Hemoglobin 14.5 11.7 - 15.7 g/dL    Hematocrit 43.3 35.0 - 47.0 %    MCV 85 78 - 100 fL    MCH 28.5 26.5 - 33.0 pg    MCHC 33.5 31.5 - 36.5 g/dL    RDW 12.7 10.0 - 15.0 %    Platelet Count 319 150 - 450 10e3/uL   Comprehensive metabolic panel (BMP + Alb, Alk Phos, ALT, AST, Total. Bili, TP)    Collection Time: 06/14/24  4:07 PM   Result Value Ref Range    Sodium 140 135 - 145 mmol/L    Potassium 3.8 3.4 - 5.3 mmol/L    Carbon Dioxide (CO2) 23 22 - 29 mmol/L    Anion Gap 11 7 - 15 mmol/L    Urea Nitrogen 11.5 6.0 - 20.0 mg/dL    Creatinine 0.77 0.51 - 0.95 mg/dL    GFR Estimate >90 >60 mL/min/1.73m2    Calcium 9.4 8.6 - 10.0 mg/dL    Chloride 106 98 - 107 mmol/L    Glucose 86 70 - 99 mg/dL    Alkaline Phosphatase 67 40 - 150 U/L    AST 15 0 - 45 U/L    ALT 12 0 - 50 U/L    Protein Total 7.2 6.4 - 8.3 g/dL    Albumin 4.5 3.5 - 5.2 g/dL    Bilirubin Total 0.4 <=1.2 mg/dL    Patient Fasting > 8hrs? No    Hemoglobin A1c    Collection Time: 06/14/24  4:07 PM   Result Value Ref Range    Hemoglobin A1C 5.3 0.0 - 5.6 %   TSH with free T4 reflex    Collection Time: 06/14/24  4:07 PM   Result Value Ref Range    TSH 1.59 0.30 - 4.20 uIU/mL      No EKG required for low risk surgery (cataract, skin procedure, breast biopsy, etc).    Revised Cardiac Risk Index  (RCRI)  The patient has the following serious cardiovascular risks for perioperative complications:   - No serious cardiac risks = 0 points     RCRI Interpretation: 0 points: Class I (very low risk - 0.4% complication rate)         Signed Electronically by: Veena Figueroa MD  Copy of this evaluation report is provided to requesting physician.

## 2024-06-14 NOTE — PATIENT INSTRUCTIONS

## 2024-06-15 LAB
ALBUMIN SERPL BCG-MCNC: 4.5 G/DL (ref 3.5–5.2)
ALP SERPL-CCNC: 67 U/L (ref 40–150)
ALT SERPL W P-5'-P-CCNC: 12 U/L (ref 0–50)
ANION GAP SERPL CALCULATED.3IONS-SCNC: 11 MMOL/L (ref 7–15)
AST SERPL W P-5'-P-CCNC: 15 U/L (ref 0–45)
BILIRUB SERPL-MCNC: 0.4 MG/DL
BUN SERPL-MCNC: 11.5 MG/DL (ref 6–20)
CALCIUM SERPL-MCNC: 9.4 MG/DL (ref 8.6–10)
CHLORIDE SERPL-SCNC: 106 MMOL/L (ref 98–107)
CHOLEST SERPL-MCNC: 177 MG/DL
CREAT SERPL-MCNC: 0.77 MG/DL (ref 0.51–0.95)
DEPRECATED HCO3 PLAS-SCNC: 23 MMOL/L (ref 22–29)
EGFRCR SERPLBLD CKD-EPI 2021: >90 ML/MIN/1.73M2
FASTING STATUS PATIENT QL REPORTED: NO
FASTING STATUS PATIENT QL REPORTED: NO
GLUCOSE SERPL-MCNC: 86 MG/DL (ref 70–99)
HDLC SERPL-MCNC: 62 MG/DL
LDLC SERPL CALC-MCNC: 89 MG/DL
NONHDLC SERPL-MCNC: 115 MG/DL
POTASSIUM SERPL-SCNC: 3.8 MMOL/L (ref 3.4–5.3)
PROT SERPL-MCNC: 7.2 G/DL (ref 6.4–8.3)
SODIUM SERPL-SCNC: 140 MMOL/L (ref 135–145)
TRIGL SERPL-MCNC: 128 MG/DL
TSH SERPL DL<=0.005 MIU/L-ACNC: 1.59 UIU/ML (ref 0.3–4.2)

## 2024-06-19 ENCOUNTER — APPOINTMENT (OUTPATIENT)
Dept: LAB | Facility: CLINIC | Age: 29
End: 2024-06-19
Payer: COMMERCIAL

## 2024-06-19 ENCOUNTER — LAB (OUTPATIENT)
Dept: LAB | Facility: CLINIC | Age: 29
End: 2024-06-19
Payer: COMMERCIAL

## 2024-06-19 DIAGNOSIS — Z01.818 PREOP GENERAL PHYSICAL EXAM: ICD-10-CM

## 2024-06-19 LAB
ERYTHROCYTE [DISTWIDTH] IN BLOOD BY AUTOMATED COUNT: 12.5 % (ref 10–15)
HCT VFR BLD AUTO: 44.3 % (ref 35–47)
HGB BLD-MCNC: 14.7 G/DL (ref 11.7–15.7)
MCH RBC QN AUTO: 28.4 PG (ref 26.5–33)
MCHC RBC AUTO-ENTMCNC: 33.2 G/DL (ref 31.5–36.5)
MCV RBC AUTO: 86 FL (ref 78–100)
PLATELET # BLD AUTO: 315 10E3/UL (ref 150–450)
RBC # BLD AUTO: 5.17 10E6/UL (ref 3.8–5.2)
WBC # BLD AUTO: 11.4 10E3/UL (ref 4–11)

## 2024-06-19 PROCEDURE — 85027 COMPLETE CBC AUTOMATED: CPT

## 2024-06-19 PROCEDURE — 36415 COLL VENOUS BLD VENIPUNCTURE: CPT

## 2024-06-21 ENCOUNTER — TELEPHONE (OUTPATIENT)
Dept: FAMILY MEDICINE | Facility: CLINIC | Age: 29
End: 2024-06-21
Payer: COMMERCIAL

## 2024-06-21 DIAGNOSIS — Z83.2 FAMILY HISTORY OF FACTOR V LEIDEN MUTATION: Primary | ICD-10-CM

## 2024-06-21 NOTE — TELEPHONE ENCOUNTER
General Call    Contacts       Contact Date/Time Type Contact Phone/Fax    06/21/2024 10:38 AM CDT Phone (Incoming) Katherine, Diagnostic Lab [phone]          Reason for Call:  Lab ordered by Dr. Figueroa    What are your questions or concerns:      Katherine calling from Diagnostic lab regarding the Factor 5 leiden mutation analysis lab that was ordered on 6/14/2024. This lab came back abnormal.    Katherine is calling back to see if Dr. Figueroa would like to add another lab,  Factor 2 Propthombien [id number] Mutation analysis to check? If so, please place lab order.     Date of last appointment with provider: 6/14/2024    Routing message to Dr. Figueroa to review and advise.    Charley Lopez, BSN, RN   [facility]

## 2024-06-21 NOTE — TELEPHONE ENCOUNTER
No, I will refer to hematology for further testing    RN - pt is scheduled for surgery early next week. Can you please inform derm team of abnormal testing as labs are not resulted.     DM

## 2024-06-21 NOTE — TELEPHONE ENCOUNTER
Writer attempted to call Dermatology team @  [phone]  with Selin Torres PA-C.  Unable to relay message due to office being closed.     Sole Thomas BSN RN  [facility]

## 2024-06-24 NOTE — TELEPHONE ENCOUNTER
Patient called back    Informed of the abnormal lab result and that we left message for her derm surgeon's office.     She asks if Dr. Figueroa could give her a quick call today-- advised Dr. Figueroa is not in clinic until Wednesday but we could pass the request along.     Debra Freeman, CHERYLEN RN  [facility]

## 2024-06-24 NOTE — TELEPHONE ENCOUNTER
1-Called this [facility] Dermatology.    Writer  called Dermatology team @  [phone]  with Selin Torres PA-C. LM per Dr. Figueroa.    I then got call back from this Derm clinic. They have not seen this pt.    This looks like the actual dermatology provider per preop note-     I found this plastic surgeon office [phone].  This is the surgeon from dermatology. Left his staff this message.        2- Hematology referral- I don't see this ordered.  Sending back to Dr. Figueroa.    3- Updated Diagnostic lab. Gave them  's response.    4- I did leave pt a VM. We should let her know about this lab result and that we updated her derm surgeon.    Geovanna Helms RN-[facility]

## 2024-06-24 NOTE — TELEPHONE ENCOUNTER
Return call from [facility] derm-patient has not been seen. Was scheduled, but cancelled so they are unable to assist any further-

## 2024-07-16 ENCOUNTER — MYC MEDICAL ADVICE (OUTPATIENT)
Dept: HEMATOLOGY | Facility: CLINIC | Age: 29
End: 2024-07-16
Payer: COMMERCIAL

## 2024-07-25 ENCOUNTER — TELEPHONE (OUTPATIENT)
Dept: FAMILY MEDICINE | Facility: CLINIC | Age: 29
End: 2024-07-25
Payer: COMMERCIAL

## 2024-07-25 NOTE — TELEPHONE ENCOUNTER
Appointment Reminder>Left patient message regarding to in clinic appointment tomorrow at 10:10am. with Dr. Figueroa at our [facility]. Any questions, please call us at [phone].     [facility]

## 2024-08-22 NOTE — PROGRESS NOTES
[facility]  [address] [place] [address]  Phone: [phone], Fax: [phone]    Outpatient Visit Note:    Patient: Mervin Echevarria  MRN: [id number]  : 1995  ANTHONY:  22     Reason for Consultation:  Mervin Echevarria is referred by Dr. Fgiueroa for evaluation and management of factor V Leiden.    Assessment:  In summary, Mervin Echevarria is a 29 year old female with past medical history significant for mood disorder, GERD, gastroparesis who was recently found to have factor V Leiden heterozygosity. She had testing completed during preop for malignant melanoma excision due to family history of FVL in her mother and sister. Her sister has history of recurrent pregnancy loss which prompted testing but no history of venous thromboembolism. No family members including mother have history of venous thromboembolism. Mervin has had several thrombosis challenges in the past including multiple surgeries and estrogen exposure as well as travel. She has no personal history of superficial or deep vein thrombosis. She was diagnosed with malignant melanoma and fortunately had clean margins without evidence of metastasis.  She is up to date with the rest of her age appropriate malignancy screenings. She is not planning any future pregnancies. She has one non biological son, Luigi.     Plan:  Majority of today's visit was spent counseling the patient regarding Factor V Leiden heterozygosity. I explained that Factor V Leiden mutation is a common genetic thrombophilia ~ 5% of the general population carries the mutation. Only about 10% of those with the mutation ever develop a thromboembolic event in their lifetime and it is usually associated with other provoking factors. People with heterozygous Factor V Leiden Mutation have a 5 fold increase risk of venous thrombosis. For comparison, women who are on estrogen based birth control have about a 7 fold  increase risk of venous thrombosis. Thus heterozygous Factor V Leiden Mutation is a weak risk factor for DVT/PE.      She patient has no indication for continuous primary venous thromboembolism prophylaxis however she should be given pharmacological venous thromboembolism prophylaxis during high risk time periods. The patient should be on prophylactic intensity anticoagulation during high-risk situations including after major surgery, after trauma, immobilization for >3 days, for 6 weeks postpartum. The patient was instructed to contact the office should any of this situations arise. She should avoid exogenous estrogen therapy. Progesterone only options are safe. For long distance travel, she should wear compression garments, ambulate every 2 hours, and stay hydrated.     Should they required pharmacological venous thromboembolism prophylaxis, I prefer Eliquis or Lovenox over Xarelto given concern about absorption issues with history of gastroparesis.      Reviewed signs/symptoms of venous thromboembolism and when to present for evaluation.     All questions were answered to her satisfaction. I have no routine follow up scheduled. She was instructed to reach out if she has any questions/concerns, new symptoms, or if any situations arise where she needs venous thromboembolism prophylaxis prescribed.         Video-Visit Details:  Type of service:  Video Visit   Joined the call at 8/26/2024, 12:28:34 pm.  Left the call at 8/26/2024, 12:56:53 pm.  You were on the call for 28 minutes 18 seconds .   Originating Location (pt. Location): Home  Distant Location (provider location):  [facility]  Mode of Communication:  Video Conference via [employer]      The patient is given our center's contact information and is instructed to call if they should have any further questions or concerns.  Otherwise, we will plan on seeing them back as needed.      Patient understands and agrees with the above  plan and recommendation.      Ann Marie Galeas, MPH, PA-C  [facility]    45 minutes spent by me on the date of the encounter doing chart review, review of outside records, review of test results, interpretation of tests, patient visit, and documentation     ----------------------------------------------------------------------------------------------------------------------  History of Present Illness:  Mervin Echevarria is a 29 year old female with past medical history significant for mood disorder, GERD, gastroparesis and malignant melanoma s/p excision with clean margins and no metastasis.     Mervin was recently diagnosed with factor V Leiden heterozygosity. Her testing was ordered as her sister and mother were also diagnosed. Her sister had recurrent miscarriages and underwent thrombophilia evaluation in [place] which found FVL heterozygosity. She ultimately was placed on heparin for her recent successful term pregnancy. Her sister has no superficial or deep vein thrombosis. Her mother was subsequently tested and found to also be heterozygous factor V Leiden but has no personal history of thrombosis. She was however just recently diagnosed with breast cancer. Her family's history prompted Mervin's testing. There is no other extended family history of documented venous thromboembolism.     Mervin has no personal history of venous thromboembolism. She denies any history of tobacco use. She is currently 79.4kg. She has spider veins but no prominent varicosities. She has had one pregnancy that was terminated. She has one non biological adopted son Luigi who is three years old. She denies any plans for future pregnancies. She notes that she was previously on several different forms of contraception including the pill, then IUD, the nexplanon and more recently NuvaRing. She has been off all contraceptives for over a year now. She is up to date with age  appropriate cancer screenings. She will be having close dermatology follow up given history of malignant melanoma. Fortunately with clean margins and no evidence of metastasis, she will not require any adjuvant therapies. Her prior surgical history includes appendectomy, tonsillectomy, wisdom teeth extraction, melanoma excision and EGD. She tolerated all of these procedures well without clotting complications. She denies any history of bleeding concerns.          Past Medical History:  Past Medical History:   Diagnosis Date    Anxiety Age 22    Eating disorder     restrictive and then binging    Esophageal reflux Age 8    Didnt notice as i aged until 23    Gastroparesis     Genital problems Age 22    Genito pelvic pain    GERD (gastroesophageal reflux disease)     esophageal polyp       Past Surgical History:  Past Surgical History:   Procedure Laterality Date    APPENDECTOMY      4/2002    ENT SURGERY  2011    tonsils       Medications:  Current Outpatient Medications   Medication Sig Dispense Refill    famotidine (PEPCID) 40 MG tablet Take 1 tablet (40 mg) by mouth daily 90 tablet 3    propranolol (INDERAL) 10 MG tablet TAKE 1 TABLET(10 MG) BY MOUTH DAILY AS NEEDED FOR ANXIETY 90 tablet 0        Allergies:  No Known Allergies    ROS:  Remainder of a comprehensive 14 point ROS is negative unless noted above.    Social History:  Patient works as a therapist.  Denies any tobacco use. Prior marijuana use, quit 2 years ago. No significant alcohol use. Denies any illicit drug use.     Family History:  Sister with miscarriage history and FVL heterozygosity, no thrombosis history  Mother with FVL heterozygosity, no thrombosis history. Recently diagnosed with breast cancer.   Maternal aunt with breast cancer.     Objectives:  Vitals: There were no vitals taken for this visit. Video Visit.   Exam:   Constitutional: Appears well, no distress      Labs:  CBC RESULTS:   Recent Labs   Lab Test 06/19/24  1116   WBC 11.4*   RBC  5.17   HGB 14.7   HCT 44.3   MCV 86   MCH 28.4   MCHC 33.2   RDW 12.5        Last Comprehensive Metabolic Panel:  Sodium   Date Value Ref Range Status   06/14/2024 140 135 - 145 mmol/L Final     Comment:     Reference intervals for this test were updated on 09/26/2023 to more accurately reflect our healthy population. There may be differences in the flagging of prior results with similar values performed with this method. Interpretation of those prior results can be made in the context of the updated reference intervals.    01/11/2021 141 133 - 144 mmol/L Final     Potassium   Date Value Ref Range Status   06/14/2024 3.8 3.4 - 5.3 mmol/L Final   01/11/2021 4.3 3.4 - 5.3 mmol/L Final     Chloride   Date Value Ref Range Status   06/14/2024 106 98 - 107 mmol/L Final   01/11/2021 108 94 - 109 mmol/L Final     Carbon Dioxide   Date Value Ref Range Status   01/11/2021 29 20 - 32 mmol/L Final     Carbon Dioxide (CO2)   Date Value Ref Range Status   06/14/2024 23 22 - 29 mmol/L Final     Anion Gap   Date Value Ref Range Status   06/14/2024 11 7 - 15 mmol/L Final   01/11/2021 4 3 - 14 mmol/L Final     Glucose   Date Value Ref Range Status   06/14/2024 86 70 - 99 mg/dL Final   01/11/2021 98 70 - 99 mg/dL Final     Comment:     Fasting specimen     Urea Nitrogen   Date Value Ref Range Status   06/14/2024 11.5 6.0 - 20.0 mg/dL Final   01/11/2021 12 7 - 30 mg/dL Final     Creatinine   Date Value Ref Range Status   06/14/2024 0.77 0.51 - 0.95 mg/dL Final   01/11/2021 0.80 0.52 - 1.04 mg/dL Final     GFR Estimate   Date Value Ref Range Status   06/14/2024 >90 >60 mL/min/1.73m2 Final     Comment:     eGFR calculated using 2021 CKD-EPI equation.   01/11/2021 >90 >60 mL/min/[1.73_m2] Final     Comment:     Non  GFR Calc  Starting 12/18/2018, serum creatinine based estimated GFR (eGFR) will be   calculated using the Chronic Kidney Disease Epidemiology Collaboration   (CKD-EPI) equation.       Calcium   Date  Value Ref Range Status   06/14/2024 9.4 8.6 - 10.0 mg/dL Final   01/11/2021 9.0 8.5 - 10.1 mg/dL Final     Liver Function Studies -   Recent Labs   Lab Test 06/14/24  1607   PROTTOTAL 7.2   ALBUMIN 4.5   BILITOTAL 0.4   ALKPHOS 67   AST 15   ALT 12       FVL heterozygosity noted  Factor V 1691G>A (Leiden)  RESULTS:  Mutation analyzed: 1691G>A  Factor V 1691G>A (Leiden)  Interpretation:  PRESENT  Factor V 1691G>A (Leiden) mutation  genotype:  G/A      Imaging:  No results found for this or any previous visit (from the past 744 hour(s)).

## 2024-08-26 ENCOUNTER — VIRTUAL VISIT (OUTPATIENT)
Dept: HEMATOLOGY | Facility: CLINIC | Age: 29
End: 2024-08-26
Attending: FAMILY MEDICINE
Payer: COMMERCIAL

## 2024-08-26 DIAGNOSIS — Z83.2 FAMILY HISTORY OF FACTOR V LEIDEN MUTATION: ICD-10-CM

## 2024-08-26 DIAGNOSIS — Z71.89 ENCOUNTER FOR ANTICOAGULATION DISCUSSION AND COUNSELING: ICD-10-CM

## 2024-08-26 DIAGNOSIS — D68.51 HETEROZYGOUS FACTOR V LEIDEN MUTATION (H): Primary | ICD-10-CM

## 2024-08-26 PROCEDURE — 99204 OFFICE O/P NEW MOD 45 MIN: CPT | Mod: 95 | Performed by: PHYSICIAN ASSISTANT

## 2024-08-26 NOTE — PATIENT INSTRUCTIONS
[facility]  [address], [address], [place] [address]  Main: [phone], Fax: [phone]    Mervin,   It was a pleasure seeing you today.  Thank you for allowing us to be involved in your care.  Please let us know if there is anything else we can do for you, so that we can be sure you are leaving completely satisfied with your care experience. Below is the plan that we discussed.     Please call if you have any concerns about a new clot. See list of symptoms below.   Call if you plan to have any major surgeries, procedures or if you become immobilized for any reason.   See travel precautions below. Recommend buying a pair of knee high 20-30mmHg compression socks or sleeves from [employer] and wearing them if you plan on traveling > 4-6 hours by car or plane or train to keep blood flow moving in the legs.         Return to clinic in as needed.     If you have questions or concerns, please don't hesitate to send a Zeuss Message for non urgent matters or contact my nurse clinician, Chaka. His number is [phone]. If they are unavailable and you have immediate concerns, please call [phone] and ask for a nurse.     Ann Marie Galeas, MPH, PA-C  [facility]            [facility]  To schedule an appointment call: [phone]    Deep Vein Thrombosis (DVT) Leg Clot - go to URGENT CARE  . Swelling, usually in one leg  . Leg pain or tenderness  . Reddish in color  . Leg warm to touch    Pulmonary Embolism (PE) Lung Clot - go to EMERGENCY ROOM  Blood clot travels from leg to lung  . Sudden shortness of breath  . Chest Pain  . Unexpected cough, sometimes blood tinged    High Risk Times for Vein Clots - CALL US FOR SHORT TERM BLOOD THINNER MEDICATION  . Hospitalization  . Major surgery such as abdominal/pelvic surgery  . Knee or hip replacement  . Major  trauma: accident/fall    Moderate Risk for Vein Clots  . Older than 40  . Travel over 4-6 hours (plane, car, train, bus)  . Active cancer/chemotherapy  . Bone fracture or cast - CALL US TO DISCUSS NEED FOR BLOOD THINNERS  . Birth control (estrogen/Depo) - AVOID  . Hormone replacement- AVOID  . Pregnancy through 6 weeks post-partum  . Bed rest over 3 days  . Wheelchair or paralysis  . Obesity  . Genetic/hereditary or acquired blood clot disorder  . Prior blood clot or family history of clot    Get medical help right away!    . 350,000-600,000 DVT/PE per year.   . 100,000 deaths per year  This means about 1 out of 3 people with venous blood clots die     Recommended Websites:  clotconnect.org  stoptheclot.org                                     [facility] [phone]    In-Flight Fitness   Don t let cramped conditions put you at risk of DVT.  Keep your body moving even when traveling by airplane.     Seated Exercises:  Ankle Circles: Lift your feet off the floor and twirl your feet as if you re drawing circles with your toes. Continue this for 15 seconds, then reverse direction. Repeat as desired.   Foot Pumps: Keep your heels on the floor and lift the front of your feet toward you as high as possible. Hold for a second or two, then flatten your feet and lift your heels as high as possible, keeping the balls of your feet on the floor. Continue for 30 seconds, and repeat as desired.   Knee Lifts: Keeping your leg bent, lift your knee up to your chest. Bring back to normal position and repeat with your other leg. Repeat 20 to 30 times for each leg.   General Tips:  Try to keep your feet elevated by using the leg rests at the highest elevation. Rest your feet on your carry-on luggage if necessary.   If you have an opportunity to move around the cabin, walk to the restroom and back.   Drink plenty of fluids, preferably water, to avoid dehydration (limit caffeine & alcohol).  Walk for 30  minutes before boarding the plane.   Adapted from StopTheClot   website at www.stoptheclot.org

## 2024-08-30 ENCOUNTER — OFFICE VISIT (OUTPATIENT)
Dept: FAMILY MEDICINE | Facility: CLINIC | Age: 29
End: 2024-08-30
Payer: COMMERCIAL

## 2024-08-30 ENCOUNTER — ANCILLARY PROCEDURE (OUTPATIENT)
Dept: GENERAL RADIOLOGY | Facility: CLINIC | Age: 29
End: 2024-08-30
Attending: FAMILY MEDICINE
Payer: COMMERCIAL

## 2024-08-30 VITALS
RESPIRATION RATE: 20 BRPM | HEART RATE: 74 BPM | SYSTOLIC BLOOD PRESSURE: 135 MMHG | HEIGHT: 64 IN | OXYGEN SATURATION: 97 % | WEIGHT: 169.3 LBS | BODY MASS INDEX: 28.9 KG/M2 | TEMPERATURE: 97.2 F | DIASTOLIC BLOOD PRESSURE: 74 MMHG

## 2024-08-30 DIAGNOSIS — Z12.4 CERVICAL CANCER SCREENING: ICD-10-CM

## 2024-08-30 DIAGNOSIS — F41.0 PANIC ATTACK: ICD-10-CM

## 2024-08-30 DIAGNOSIS — M79.645 PAIN OF FINGER OF LEFT HAND: ICD-10-CM

## 2024-08-30 DIAGNOSIS — F41.1 GAD (GENERALIZED ANXIETY DISORDER): ICD-10-CM

## 2024-08-30 DIAGNOSIS — Z00.00 ROUTINE GENERAL MEDICAL EXAMINATION AT A HEALTH CARE FACILITY: ICD-10-CM

## 2024-08-30 PROCEDURE — 73140 X-RAY EXAM OF FINGER(S): CPT | Mod: LT | Performed by: INTERNAL MEDICINE

## 2024-08-30 PROCEDURE — 99395 PREV VISIT EST AGE 18-39: CPT | Performed by: FAMILY MEDICINE

## 2024-08-30 PROCEDURE — G0145 SCR C/V CYTO,THINLAYER,RESCR: HCPCS | Performed by: FAMILY MEDICINE

## 2024-08-30 PROCEDURE — 87624 HPV HI-RISK TYP POOLED RSLT: CPT | Performed by: FAMILY MEDICINE

## 2024-08-30 PROCEDURE — 99214 OFFICE O/P EST MOD 30 MIN: CPT | Mod: 25 | Performed by: FAMILY MEDICINE

## 2024-08-30 RX ORDER — PROPRANOLOL HYDROCHLORIDE 10 MG/1
10 TABLET ORAL DAILY PRN
Qty: 90 TABLET | Refills: 0 | Status: SHIPPED | OUTPATIENT
Start: 2024-08-30

## 2024-08-30 SDOH — HEALTH STABILITY: PHYSICAL HEALTH: ON AVERAGE, HOW MANY DAYS PER WEEK DO YOU ENGAGE IN MODERATE TO STRENUOUS EXERCISE (LIKE A BRISK WALK)?: 1 DAY

## 2024-08-30 ASSESSMENT — SOCIAL DETERMINANTS OF HEALTH (SDOH): HOW OFTEN DO YOU GET TOGETHER WITH FRIENDS OR RELATIVES?: TWICE A WEEK

## 2024-08-30 NOTE — PATIENT INSTRUCTIONS
Patient Education   Preventive Care Advice   This is general advice given by our system to help you stay healthy. However, your care team may have specific advice just for you. Please talk to your care team about your preventive care needs.  Nutrition  Eat 5 or more servings of fruits and vegetables each day.  Try wheat bread, brown rice and whole grain pasta (instead of white bread, rice, and pasta).  Get enough calcium and vitamin D. Check the label on foods and aim for 100% of the RDA (recommended daily allowance).  Lifestyle  Exercise at least 150 minutes each week  (30 minutes a day, 5 days a week).  Do muscle strengthening activities 2 days a week. These help control your weight and prevent disease.  No smoking.  Wear sunscreen to prevent skin cancer.  Have a dental exam and cleaning every 6 months.  Yearly exams  See your health care team every year to talk about:  Any changes in your health.  Any medicines your care team has prescribed.  Preventive care, family planning, and ways to prevent chronic diseases.  Shots (vaccines)   HPV shots (up to age 26), if you've never had them before.  Hepatitis B shots (up to age 59), if you've never had them before.  COVID-19 shot: Get this shot when it's due.  Flu shot: Get a flu shot every year.  Tetanus shot: Get a tetanus shot every 10 years.  Pneumococcal, hepatitis A, and RSV shots: Ask your care team if you need these based on your risk.  Shingles shot (for age 50 and up)  General health tests  Diabetes screening:  Starting at age 35, Get screened for diabetes at least every 3 years.  If you are younger than age 35, ask your care team if you should be screened for diabetes.  Cholesterol test: At age 39, start having a cholesterol test every 5 years, or more often if advised.  Bone density scan (DEXA): At age 50, ask your care team if you should have this scan for osteoporosis (brittle bones).  Hepatitis C: Get tested at least once in your life.  STIs (sexually  transmitted infections)  Before age 24: Ask your care team if you should be screened for STIs.  After age 24: Get screened for STIs if you're at risk. You are at risk for STIs (including HIV) if:  You are sexually active with more than one person.  You don't use condoms every time.  You or a partner was diagnosed with a sexually transmitted infection.  If you are at risk for HIV, ask about PrEP medicine to prevent HIV.  Get tested for HIV at least once in your life, whether you are at risk for HIV or not.  Cancer screening tests  Cervical cancer screening: If you have a cervix, begin getting regular cervical cancer screening tests starting at age 21.  Breast cancer scan (mammogram): If you've ever had breasts, begin having regular mammograms starting at age 40. This is a scan to check for breast cancer.  Colon cancer screening: It is important to start screening for colon cancer at age 45.  Have a colonoscopy test every 10 years (or more often if you're at risk) Or, ask your provider about stool tests like a FIT test every year or Cologuard test every 3 years.  To learn more about your testing options, visit:   .  For help making a decision, visit:   https://bit.ly/se99988.  Prostate cancer screening test: If you have a prostate, ask your care team if a prostate cancer screening test (PSA) at age 55 is right for you.  Lung cancer screening: If you are a current or former smoker ages 50 to 80, ask your care team if ongoing lung cancer screenings are right for you.  For informational purposes only. Not to replace the advice of your health care provider. Copyright   2023 [facility]. All rights reserved. Clinically reviewed by the [facility]. [employer] - The Bigger Picture 114939 - REV 01/24.  Learning About Stress  What is stress?     Stress is your body's response to a hard situation. Your body can have a physical, emotional, or mental response. Stress is a fact of life for most people, and it  affects everyone differently. What causes stress for you may not be stressful for someone else.  A lot of things can cause stress. You may feel stress when you go on a job interview, take a test, or run a race. This kind of short-term stress is normal and even useful. It can help you if you need to work hard or react quickly. For example, stress can help you finish an important job on time.  Long-term stress is caused by ongoing stressful situations or events. Examples of long-term stress include long-term health problems, ongoing problems at work, or conflicts in your family. Long-term stress can harm your health.  How does stress affect your health?  When you are stressed, your body responds as though you are in danger. It makes hormones that speed up your heart, make you breathe faster, and give you a burst of energy. This is called the fight-or-flight stress response. If the stress is over quickly, your body goes back to normal and no harm is done.  But if stress happens too often or lasts too long, it can have bad effects. Long-term stress can make you more likely to get sick, and it can make symptoms of some diseases worse. If you tense up when you are stressed, you may develop neck, shoulder, or low back pain. Stress is linked to high blood pressure and heart disease.  Stress also harms your emotional health. It can make you vaughan, tense, or depressed. Your relationships may suffer, and you may not do well at work or school.  What can you do to manage stress?  You can try these things to help manage stress:   Do something active. Exercise or activity can help reduce stress. Walking is a great way to get started. Even everyday activities such as housecleaning or yard work can help.  Try yoga or ute chi. These techniques combine exercise and meditation. You may need some training at first to learn them.  Do something you enjoy. For example, listen to music or go to a movie. Practice your hobby or do volunteer  "work.  Meditate. This can help you relax, because you are not worrying about what happened before or what may happen in the future.  Do guided imagery. Imagine yourself in any setting that helps you feel calm. You can use online videos, books, or a teacher to guide you.  Do breathing exercises. For example:  From a standing position, bend forward from the waist with your knees slightly bent. Let your arms dangle close to the floor.  Breathe in slowly and deeply as you return to a standing position. Roll up slowly and lift your head last.  Hold your breath for just a few seconds in the standing position.  Breathe out slowly and bend forward from the waist.  Let your feelings out. Talk, laugh, cry, and express anger when you need to. Talking with supportive friends or family, a counselor, or a hiren leader about your feelings is a healthy way to relieve stress. Avoid discussing your feelings with people who make you feel worse.  Write. It may help to write about things that are bothering you. This helps you find out how much stress you feel and what is causing it. When you know this, you can find better ways to cope.  What can you do to prevent stress?  You might try some of these things to help prevent stress:  Manage your time. This helps you find time to do the things you want and need to do.  Get enough sleep. Your body recovers from the stresses of the day while you are sleeping.  Get support. Your family, friends, and community can make a difference in how you experience stress.  Limit your news feed. Avoid or limit time on social media or news that may make you feel stressed.  Do something active. Exercise or activity can help reduce stress. Walking is a great way to get started.  Where can you learn more?  Go to https://www.healthwise.net/patiented  Enter N032 in the search box to learn more about \"Learning About Stress.\"  Current as of: October 24, 2023               Content Version: 14.0    0906-4819 " Healthwise, [employer].   Care instructions adapted under license by your healthcare professional. If you have questions about a medical condition or this instruction, always ask your healthcare professional. Healthwise[employer] disclaims any warranty or liability for your use of this information.

## 2024-08-30 NOTE — PROGRESS NOTES
"Preventive Care Visit  [facility]  Veena Ventura Figueroa MD, Family Medicine  Aug 30, 2024      Assessment & Plan     Routine general medical examination at a health care facility  - hepatitis B up to date   - PRIMARY CARE FOLLOW-UP SCHEDULING; Future    NURY (generalized anxiety disorder)  - restart Zoloft   - if symptoms are not improving then she'll follow up with psychiatrist in two months   - sertraline (ZOLOFT) 50 MG tablet; Start with Zoloft 25 mg daily for 2 weeks, if tolerating medication then increase to Zoloft 50 mg daily    Pain of finger of left hand  - ordered xray for further evaluation  - if indicated will refer to physical therapy and ortho   - XR Finger Left G/E 2 Views; Future    Cervical cancer screening  - HPV and Gynecologic Cytology Panel - Recommended Age 30-65 Years    Panic attack  - propranolol (INDERAL) 10 MG tablet; Take 1 tablet (10 mg) by mouth daily as needed (anxiety).            BMI  Estimated body mass index is 28.8 kg/m  as calculated from the following:    Height as of this encounter: 1.633 m (5' 4.29\").    Weight as of this encounter: 76.8 kg (169 lb 4.8 oz).       Counseling  Appropriate preventive services were addressed with this patient via screening, questionnaire, or discussion as appropriate for fall prevention, nutrition, physical activity, Tobacco-use cessation, social engagement, weight loss and cognition.  Checklist reviewing preventive services available has been given to the patient.  Reviewed patient's diet, addressing concerns and/or questions.   She is at risk for lack of exercise and has been provided with information to increase physical activity for the benefit of her well-being.   The patient was instructed to see the dentist every 6 months.           Sobia Jeffries is a 29 year old, presenting for the following:  Physical        8/30/2024     3:14 PM   Additional Questions   Roomed by kennedy   Accompanied by self        Health Care " Directive  Patient does not have a Health Care Directive or Living Will: Discussed advance care planning with patient; information given to patient to review.    HPI    She talked to her therapist and due to stressors recommended to restart zoloft. Prozac wasn't helpful. Zoloft was helpful with symptoms.     Left finger pain over six months. If there is weight/pressure then that triggers the pain which gets worse. She always has mild baseline pain. Pain is throbbing/aching and relieved with stopping activity. No swelling/numbness or tingling. It is difficult to open a bag of chips in that hand. No trauma. Paternal grandmother and maternal aunts have rheumatoid arthritis. No other joint pain.         8/30/2024   General Health   How would you rate your overall physical health? Good   Feel stress (tense, anxious, or unable to sleep) Rather much      (!) STRESS CONCERN      8/30/2024   Nutrition   Three or more servings of calcium each day? Yes   Diet: Regular (no restrictions)   How many servings of fruit and vegetables per day? (!) 2-3   How many sweetened beverages each day? (!) 2            8/30/2024   Exercise   Days per week of moderate/strenous exercise 1 day      (!) EXERCISE CONCERN      8/30/2024   Social Factors   Frequency of gathering with friends or relatives Twice a week   Worry food won't last until get money to buy more No   Food not last or not have enough money for food? No   Do you have housing? (Housing is defined as stable permanent housing and does not include staying ouside in a car, in a tent, in an abandoned building, in an overnight shelter, or couch-surfing.) Yes   Are you worried about losing your housing? No   Lack of transportation? No   Unable to get utilities (heat,electricity)? No            8/30/2024   Dental   Dentist two times every year? (!) NO            6/14/2024   TB Screening   Were you born outside of the [place]? Yes            8/30/2024   Substance Use   Alcohol more than 3/day  "or more than 7/wk No   Do you use any other substances recreationally? No        Social History     Tobacco Use    Smoking status: Never    Smokeless tobacco: Never   Vaping Use    Vaping status: Never Used   Substance Use Topics    Alcohol use: Yes     Comment: less than 1x a week    Drug use: No           10/31/2022   LAST FHS-7 RESULTS   1st degree relative breast or ovarian cancer No   Any relative bilateral breast cancer Yes   Any male have breast cancer No   Any ONE woman have BOTH breast AND ovarian cancer Unknown   Any woman with breast cancer before 50yrs Yes   2 or more relatives with breast AND/OR ovarian cancer Yes   2 or more relatives with breast AND/OR bowel cancer Unknown   Maternal aunt - breast cancer before age 50                 8/30/2024   STI Screening   New sexual partner(s) since last STI/HIV test? No        History of abnormal Pap smear: No - age 30-64 HPV with reflex Pap every 5 years recommended        1/11/2021     9:23 AM 7/18/2018     9:10 AM 9/1/2015    12:00 AM   PAP / HPV   PAP (Historical) NIL  NIL  Negative           This result is from an external source.           8/30/2024   Contraception/Family Planning   Questions about contraception or family planning No           Reviewed and updated as needed this visit by Provider                             Objective    Exam  There were no vitals taken for this visit.   Estimated body mass index is 29.88 kg/m  as calculated from the following:    Height as of 6/14/24: 1.63 m (5' 4.17\").    Weight as of 6/14/24: 79.4 kg (175 lb).    Physical Exam  GENERAL: alert and no distress  EYES: Eyes grossly normal to inspection  HENT: ear canals and TM's steve  NECK: no adenopathy, no asymmetry, masses, or scars  RESP: lungs clear to auscultation - no rales, rhonchi or wheezes  CV: regular rate and rhythm, normal S1 S2,   ABDOMEN: soft, nontender, no hepatosplenomegaly, no masses and bowel sounds normal   (female) w/bimanual: normal female " external genitalia, normal urethral meatus, normal vaginal mucosa, and normal cervix without masses or discharge  MS: no gross musculoskeletal defects noted, no edema  SKIN: no suspicious lesions or rashes  NEURO: Normal strength and tone, mentation intact and speech normal  PSYCH: mentation appears normal, affect normal        Signed Electronically by: Veena Figueroa MD

## 2024-09-04 LAB
HPV HR 12 DNA CVX QL NAA+PROBE: NEGATIVE
HPV16 DNA CVX QL NAA+PROBE: NEGATIVE
HPV18 DNA CVX QL NAA+PROBE: NEGATIVE
HUMAN PAPILLOMA VIRUS FINAL DIAGNOSIS: NORMAL

## 2024-09-06 LAB
BKR AP ASSOCIATED HPV REPORT: NORMAL
BKR LAB AP GYN ADEQUACY: NORMAL
BKR LAB AP GYN INTERPRETATION: NORMAL
BKR LAB AP LMP: NORMAL
BKR LAB AP PREVIOUS ABNORMAL: NORMAL
PATH REPORT.COMMENTS IMP SPEC: NORMAL
PATH REPORT.COMMENTS IMP SPEC: NORMAL
PATH REPORT.RELEVANT HX SPEC: NORMAL

## 2024-09-12 ENCOUNTER — PATIENT OUTREACH (OUTPATIENT)
Dept: FAMILY MEDICINE | Facility: CLINIC | Age: 29
End: 2024-09-12
Payer: COMMERCIAL

## 2024-09-12 PROBLEM — R87.615 UNSATISFACTORY CERVICAL PAPANICOLAOU SMEAR: Status: ACTIVE | Noted: 2024-08-30

## 2024-09-12 PROBLEM — R87.615 UNSATISFACTORY CERVICAL PAPANICOLAOU SMEAR: Status: ACTIVE | Noted: 2024-09-12

## 2024-09-17 ENCOUNTER — PATIENT OUTREACH (OUTPATIENT)
Dept: ONCOLOGY | Facility: CLINIC | Age: 29
End: 2024-09-17
Payer: COMMERCIAL

## 2024-09-17 NOTE — PROGRESS NOTES
New Patient Oncology Nurse Navigator Note     Referring provider: Veena Figueroa MD      Referring Clinic/Organization: [facility]      Referred to (specialty:) Genetic Counseling     Requested provider (if applicable): NA     Date Referral Received: September 17, 2024     Evaluation for: Family h/o breast cancer and needs early screening     Z80.3 (ICD-10-CM) - Family history of malignant neoplasm of breast      Payor: BCBS / Plan: BCBS [place] / Product Type: Indemnity /     September 17, 2024    Referral received and reviewed.   Sent to NPS to process.     Gisele LOBATON, RN, OCN  Oncology Nurse Navigator   [facility]  Cancer Care Service Line   New Patient Hem/Onc Scheduling / Referrals: [phone] (fax: [phone] )

## 2024-09-26 ENCOUNTER — MYC MEDICAL ADVICE (OUTPATIENT)
Dept: FAMILY MEDICINE | Facility: CLINIC | Age: 29
End: 2024-09-26
Payer: COMMERCIAL

## 2024-09-26 NOTE — TELEPHONE ENCOUNTER
Agreeable to patient arriving at 3pm for pap? Any advisement on long wait for risk clinic?      *That appt is on hold*

## 2024-10-11 ENCOUNTER — OFFICE VISIT (OUTPATIENT)
Dept: FAMILY MEDICINE | Facility: CLINIC | Age: 29
End: 2024-10-11
Payer: COMMERCIAL

## 2024-10-11 VITALS
HEART RATE: 79 BPM | OXYGEN SATURATION: 95 % | WEIGHT: 176.6 LBS | RESPIRATION RATE: 20 BRPM | SYSTOLIC BLOOD PRESSURE: 106 MMHG | BODY MASS INDEX: 30.15 KG/M2 | HEIGHT: 64 IN | TEMPERATURE: 97.3 F | DIASTOLIC BLOOD PRESSURE: 73 MMHG

## 2024-10-11 DIAGNOSIS — Z12.4 CERVICAL CANCER SCREENING: Primary | ICD-10-CM

## 2024-10-11 DIAGNOSIS — Z80.3 FAMILY HISTORY OF MALIGNANT NEOPLASM OF BREAST: ICD-10-CM

## 2024-10-11 PROCEDURE — 87624 HPV HI-RISK TYP POOLED RSLT: CPT | Performed by: FAMILY MEDICINE

## 2024-10-11 PROCEDURE — 99207 PR NO CHARGE LOS: CPT | Mod: 25 | Performed by: FAMILY MEDICINE

## 2024-10-11 PROCEDURE — G0145 SCR C/V CYTO,THINLAYER,RESCR: HCPCS | Performed by: FAMILY MEDICINE

## 2024-10-11 PROCEDURE — 91320 SARSCV2 VAC 30MCG TRS-SUC IM: CPT | Performed by: FAMILY MEDICINE

## 2024-10-11 PROCEDURE — 90656 IIV3 VACC NO PRSV 0.5 ML IM: CPT | Performed by: FAMILY MEDICINE

## 2024-10-11 PROCEDURE — 90480 ADMN SARSCOV2 VAC 1/ONLY CMP: CPT | Performed by: FAMILY MEDICINE

## 2024-10-11 PROCEDURE — 90471 IMMUNIZATION ADMIN: CPT | Performed by: FAMILY MEDICINE

## 2024-10-11 NOTE — PROGRESS NOTES
"  Assessment & Plan     Cervical cancer screening  - Primary HPV with Optional Reflex to Pap - Age 30 - 65 Years    Family history of malignant neoplasm of breast  - MA Screen Bilateral w/Melvin; Future          BMI  Estimated body mass index is 29.89 kg/m  as calculated from the following:    Height as of this encounter: 1.637 m (5' 4.45\").    Weight as of this encounter: 80.1 kg (176 lb 9.6 oz).             Sobia Jeffries is a 29 year old, presenting for the following health issues:  Repeat Pap Smear      10/11/2024     2:56 PM   Additional Questions   Roomed by kennedy   Accompanied by self     History of Present Illness       Reason for visit:  Pap    She eats 4 or more servings of fruits and vegetables daily.She consumes 1 sweetened beverage(s) daily.She exercises with enough effort to increase her heart rate 20 to 29 minutes per day.  She exercises with enough effort to increase her heart rate 3 or less days per week.   She is taking medications regularly.                     Objective    LMP 07/25/2024   There is no height or weight on file to calculate BMI.  Physical Exam               Signed Electronically by: Veena Figueroa MD    "

## 2024-10-14 ENCOUNTER — PATIENT OUTREACH (OUTPATIENT)
Dept: CARE COORDINATION | Facility: CLINIC | Age: 29
End: 2024-10-14
Payer: COMMERCIAL

## 2024-10-21 LAB
BKR LAB AP GYN ADEQUACY: NORMAL
BKR LAB AP GYN INTERPRETATION: NORMAL
BKR LAB AP HPV REFLEX: NO
BKR LAB AP PREVIOUS ABNORMAL: NORMAL
PATH REPORT.COMMENTS IMP SPEC: NORMAL
PATH REPORT.COMMENTS IMP SPEC: NORMAL
PATH REPORT.RELEVANT HX SPEC: NORMAL

## 2024-10-25 ASSESSMENT — ANXIETY QUESTIONNAIRES: GAD7 TOTAL SCORE: 7

## 2024-10-28 NOTE — PATIENT INSTRUCTIONS
"If you have any questions regarding your allergies, asthma, or what we discussed during your visit today please call the allergy clinic or contact us via [employer].    [facility] Allergy RN Line: [phone]  [facility] Scheduling Line: [phone]  [facility] Scheduling Line: [phone]      Patient Education     Lactose Intolerance    Lactose is a simple sugar found in milk and dairy products. Lactose is found in dairy products such as milk, cheese, cottage cheese, cream, sour cream, ice cream, sherbet, milk solids, powdered milk, and whey.  Lactose is digested with the help of an enzyme the body makes called  lactase.\" People with lactose intolerance cannot digest dairy products. This is because their bodies do not make enough lactase. Undigested lactose in the gut cannot be absorbed. This can cause nausea, cramping, bloating, gas, diarrhea, and foul-smelling stools. These symptoms occur within 30 minutes to 2 hours after eating. Symptoms may be mild or severe.  Babies are born with the lactose enzyme, which allows them to absorb breast milk. However, lactose intolerance may appear in children as early as 2 to 5 years old. Even if you have been able to eat dairy products all your life, your body may lose the ability to make the lactose enzyme as you get older. Asians,  Americans, Hispanics, and American Indians are prone to get this problem earlier in life.  Home care  The following guidelines will help you care for yourself at home:    Your body doesn t need dairy products to be healthy. So, if your symptoms are severe, it is best to stop eating dairy products that contain lactose.    If your symptoms are milder, you can probably do well consuming smaller amounts of dairy as long as you combine it with nondairy foods. Yogurt with live cultures may be OK because it contains enzymes that digest lactose. Butter, margarine, hard and aged cheeses (cheddar, Swiss) contain less " lactose and may be OK to eat. You will have to experiment to learn how much dairy you can tolerate without getting symptoms. It may help to keep a food diary.    There are many lactose-free dairy products including milk, ice cream, and cheeses that allow you to still enjoy dairy products. You may also take a lactase enzyme as a supplement that will help you digest dairy products.    We all need calcium and vitamin D in our diet for healthy bones. If you reduce or eliminate dairy from your diet, you need to replace it with other food sources that contain these nutrients such as kale, broccoli, white beans, green beans, lima beans, salmon, soy beans, tofu, or fortified juices. Or, you can take daily supplements.    Learn to read food labels. Also, watch for prepared foods that are made with products that contain lactose (such as bread, cereal, lunch meats, salad dressings, cake and cookie mix, and coffee creamers). However, many people can consume small amounts of lactose without symptoms, so an overly restrictive diet is often not needed.    Lactase enzyme supplements can be obtained over-the-counter. They can help control symptoms if you take the enzymes when you eat lactose.  Other products besides milk and milk products may contain lactose. They may be less obvious, so check the labels carefully. These things may not bother you, but should be considered if you continue to have problems:    Bread and other baked goods    Waffles, pancakes, biscuits, cookies, and mixes to make them    Processed breakfast foods such as doughnuts, frozen waffles and pancakes, toaster pastries, and sweet rolls    Processed breakfast cereals    Instant potatoes, soups, and breakfast drinks    Potato chips, corn chips, and other processed snacks    Processed meats like zhang, sausage, hot dogs, and lunch meats    Margarine    Salad dressings    Liquid and powdered milk-based meal replacements    Protein powders and  bars    Candies    Nondairy liquid and powdered coffee creamers    Nondairy whipped toppings  Follow-up care  Follow up with your healthcare provider, or as advised.  When to seek medical advice  Call your healthcare provider right away if any of these occur:    Increasing abdominal pain or swelling    Abdominal pain that moves to the right side of the lower abdomen    Fever of 100.4 F (38 C) or higher, or as directed by your healthcare provider    Repeated vomiting or diarrhea    Blood in the stool or black and tarry stool (depending on the amount of blood, consider calling 911 for emergency help)  Date Last Reviewed: 10/1/2016    [phone] [employer]. [address] [place] [address], [place] [address]. All rights reserved. This information is not intended as a substitute for professional medical care. Always follow your healthcare professional's instructions.            [Follow-Up: _____] : a [unfilled] follow-up visit  [Parents] : parents

## 2024-12-10 DIAGNOSIS — F41.0 PANIC ATTACK: ICD-10-CM

## 2024-12-10 RX ORDER — PROPRANOLOL HYDROCHLORIDE 10 MG/1
TABLET ORAL
Qty: 90 TABLET | Refills: 0 | Status: SHIPPED | OUTPATIENT
Start: 2024-12-10

## 2024-12-27 ENCOUNTER — ANCILLARY PROCEDURE (OUTPATIENT)
Dept: GENERAL RADIOLOGY | Facility: CLINIC | Age: 29
End: 2024-12-27
Attending: EMERGENCY MEDICINE
Payer: COMMERCIAL

## 2024-12-27 ENCOUNTER — OFFICE VISIT (OUTPATIENT)
Dept: URGENT CARE | Facility: URGENT CARE | Age: 29
End: 2024-12-27
Payer: COMMERCIAL

## 2024-12-27 VITALS
HEART RATE: 63 BPM | BODY MASS INDEX: 31.48 KG/M2 | DIASTOLIC BLOOD PRESSURE: 67 MMHG | OXYGEN SATURATION: 96 % | WEIGHT: 186 LBS | TEMPERATURE: 98.7 F | SYSTOLIC BLOOD PRESSURE: 104 MMHG

## 2024-12-27 DIAGNOSIS — J06.9 UPPER RESPIRATORY TRACT INFECTION, UNSPECIFIED TYPE: ICD-10-CM

## 2024-12-27 DIAGNOSIS — J40 BRONCHITIS WITH ACUTE WHEEZING: Primary | ICD-10-CM

## 2024-12-27 DIAGNOSIS — Z20.828 RSV EXPOSURE: ICD-10-CM

## 2024-12-27 LAB
DEPRECATED S PYO AG THROAT QL EIA: NEGATIVE
FLUAV AG SPEC QL IA: NEGATIVE
FLUBV AG SPEC QL IA: NEGATIVE
S PYO DNA THROAT QL NAA+PROBE: DETECTED

## 2024-12-27 PROCEDURE — 99214 OFFICE O/P EST MOD 30 MIN: CPT | Performed by: EMERGENCY MEDICINE

## 2024-12-27 PROCEDURE — 71046 X-RAY EXAM CHEST 2 VIEWS: CPT | Mod: TC | Performed by: RADIOLOGY

## 2024-12-27 PROCEDURE — 87635 SARS-COV-2 COVID-19 AMP PRB: CPT | Performed by: EMERGENCY MEDICINE

## 2024-12-27 PROCEDURE — 87804 INFLUENZA ASSAY W/OPTIC: CPT | Performed by: EMERGENCY MEDICINE

## 2024-12-27 PROCEDURE — 87651 STREP A DNA AMP PROBE: CPT | Performed by: EMERGENCY MEDICINE

## 2024-12-27 RX ORDER — ALBUTEROL SULFATE 90 UG/1
2 INHALANT RESPIRATORY (INHALATION) EVERY 6 HOURS PRN
Qty: 18 G | Refills: 0 | Status: SHIPPED | OUTPATIENT
Start: 2024-12-27

## 2024-12-27 RX ORDER — BENZONATATE 100 MG/1
100 CAPSULE ORAL 3 TIMES DAILY PRN
Qty: 21 CAPSULE | Refills: 0 | Status: SHIPPED | OUTPATIENT
Start: 2024-12-27

## 2024-12-27 NOTE — PROGRESS NOTES
Assessment & Plan     Diagnosis:    ICD-10-CM    1. Bronchitis with acute wheezing  J40 Influenza A & B Antigen - Clinic Collect     COVID-19 Virus (Coronavirus) by PCR Nose     XR Chest 2 Views     Streptococcus A Rapid Screen w/Reflex to PCR - Clinic Collect     Group A Streptococcus PCR Throat Swab     albuterol (PROAIR HFA/PROVENTIL HFA/VENTOLIN HFA) 108 (90 Base) MCG/ACT inhaler     benzonatate (TESSALON) 100 MG capsule      2. RSV exposure  Z20.828           Medical Decision Making:  Mervin Echevarria is a 29 year old female who presents for evaluation of cough, sore throat, congestion.  This is consistent with an upper respiratory tract infection. Her child is sick with RSV currently and I suspect she has the same. Rapid flu and strep are negative with COVID/strep PCR pending at this time. There is no signs at this point of serious bacterial infection such as OM, RPA, epiglottitis, PTA.     Given worsening symptoms, I did a CXR to eval for pneumonia. This shows no acute infiltrate or effusion on my read. There are no signs of complications of URI/RSV at this point such as, hypoxia, respiratory failure or compromise. Does have some wheezing; prescribed medications for symptomatic management as noted above.    There are no gastrointestinal symptoms at this point and no signs of dehydration.  Close followup with PCP is indicated.  Go to ED for fever > 102 F, protracted vomiting, worsening shortness of breath, chest pain or other concerns.  Patient verbalized understanding and agreement with the plan was discharged in stable condition.      Jim Ruth PA-C  [facility]    Subjective     Mervin Echevarria is a 29 year old female who presents to clinic today for the following health issues:  Chief Complaint   Patient presents with    Illness     Son is sick at home, leaving town tomorrow     Cough     Feeling ill since 5 days ago, worse since 2 days ago     Nasal Congestion            Generalized Body Aches    Fatigue              HPI  Patient with 5 days of cough, nasal congestion, sore throat, feeling fatigued. No history of asthma or lung disease. Her son was just diagnosed with RSV and pneumonia and she is concerned for the same. She does feel a little worse today and more run down. No difficulty swallowing at rest or with minimal exertion.  No difficulty swallowing, sore throat is minor.  No rashes, nausea, vomiting, diarrhea.  Maybe has been a little bit wheezy, but has no history of asthma.    Review of Systems    See HPI    Objective      Vitals: /67 (BP Location: Right arm, Patient Position: Sitting, Cuff Size: Adult Large)   Pulse 63   Temp 98.7  F (37.1  C) (Tympanic)   Wt 84.4 kg (186 lb)   LMP 11/29/2024 (Exact Date)   SpO2 96%   BMI 31.48 kg/m        Patient Vitals for the past 24 hrs:   BP Temp Temp src Pulse SpO2 Weight   12/27/24 1348 -- -- -- 63 96 % --   12/27/24 1255 104/67 98.7  F (37.1  C) Tympanic 70 94 % 84.4 kg (186 lb)       Vital signs reviewed by: Jim Ruth PA-C    Physical Exam   Constitutional: Patient is alert and cooperative. No acute distress.  Ears: Right TM is normal. Left TM is normal. External ear canals are normal.  Mouth: Mucous membranes are moist. Normal tongue and tonsil. Posterior oropharynx is slightly erythematous. No exudates. Uvula midline.  Eyes: Conjunctivae and lids are normal.  Cardiovascular: Regular rate and rhythm  Pulmonary/Chest: Faint expiratory wheezes in the upper lung fields.  No rales or rhonchi.  Lower lung fields are clear.  Effort normal.  Speaking in full sentences, no respiratory distress.  Neurological: Alert and oriented x3.   Skin: No rash noted on visualized skin.  Psychiatric:The patient has a normal mood and affect.     Labs/Imaging:  Results for orders placed or performed in visit on 12/27/24   XR Chest 2 Views     Status: None    Narrative    CHEST TWO VIEWS   12/27/2024 1:42 PM     HISTORY: Upper  respiratory tract infection, unspecified type.    COMPARISON: None.      Impression    IMPRESSION: No acute cardiopulmonary disease.     ELYSSA CLARK MD         SYSTEM ID:  [id number]   Results for orders placed or performed in visit on 12/27/24   Influenza A & B Antigen - Clinic Collect     Status: Normal    Specimen: Nose; Swab   Result Value Ref Range    Influenza A antigen Negative Negative    Influenza B antigen Negative Negative    Narrative    Test results must be correlated with clinical data. If necessary, results should be confirmed by a molecular assay or viral culture.   Streptococcus A Rapid Screen w/Reflex to PCR - Clinic Collect     Status: Normal    Specimen: Throat; Swab   Result Value Ref Range    Group A Strep antigen Negative Negative                                 Jim Ruth PA-C, December 27, 2024

## 2024-12-28 ENCOUNTER — TELEPHONE (OUTPATIENT)
Dept: NURSING | Facility: CLINIC | Age: 29
End: 2024-12-28
Payer: COMMERCIAL

## 2024-12-28 DIAGNOSIS — J02.0 STREP THROAT: Primary | ICD-10-CM

## 2024-12-28 LAB — SARS-COV-2 RNA RESP QL NAA+PROBE: POSITIVE

## 2024-12-28 RX ORDER — PENICILLIN V POTASSIUM 500 MG/1
500 TABLET, FILM COATED ORAL 2 TIMES DAILY
Qty: 20 TABLET | Refills: 0 | Status: SHIPPED | OUTPATIENT
Start: 2024-12-28 | End: 2025-01-07

## 2024-12-28 NOTE — TELEPHONE ENCOUNTER
Patient classified as COVID treatment eligible by Epic high risk algorithm:  Yes    Coronavirus (COVID-19) Notification    Reason for call  Notify of POSITIVE COVID-19 lab result, assess symptoms,  review [facility] recommendations    Lab Result   Lab test for 2019-nCoV rRt-PCR or SARS-COV-2 PCR  Oropharyngeal AND/OR nasopharyngeal swabs were POSITIVE for 2019-nCoV RNA [OR] SARS-COV-2 RNA (COVID-19) RNA     We have been unable to reach patient by phone at this time to notify of their Positive COVID-19 result.    Left voicemail message requesting a call back to [phone] [facility] for results.        A Positive COVID-19 letter will be sent via [employer] or the mail.    Genesis Aguliera

## 2024-12-29 ENCOUNTER — MYC MEDICAL ADVICE (OUTPATIENT)
Dept: FAMILY MEDICINE | Facility: CLINIC | Age: 29
End: 2024-12-29
Payer: COMMERCIAL

## 2024-12-29 ENCOUNTER — TELEPHONE (OUTPATIENT)
Dept: FAMILY MEDICINE | Facility: CLINIC | Age: 29
End: 2024-12-29
Payer: COMMERCIAL

## 2024-12-29 DIAGNOSIS — U07.1 INFECTION DUE TO 2019 NOVEL CORONAVIRUS: Primary | ICD-10-CM

## 2024-12-30 NOTE — TELEPHONE ENCOUNTER
Sx onset 12/26, positive test 12/27  Primary sx include cough, congestion, body aches, headache  Positive for both strep and Covid  Denies fever, SOB, chest pain    RN COVID TREATMENT VISIT  12/30/24    The patient has been triaged and does not require a higher level of care.    Mervin Echevarria  29 year old  Current weight? 186lb    Has the patient been seen by a primary care or specialty provider at a [facility] within the past three years? Yes.   Have you been in close proximity to/do you have a known exposure to a person with a confirmed case of influenza? No.     General treatment eligibility:  Date of positive COVID test (PCR or at home)?  12/27/24    Are you or have you been hospitalized for this COVID-19 infection? No.   Have you received monoclonal antibodies or antiviral treatment for COVID-19 since this positive test? No.   Do you have any of the following conditions that place you at risk of being very sick from COVID-19?   - Overweight and Obesity BMI >= 25  Yes, patient has at least one high risk condition as noted above.     Current COVID symptoms:   - cough  - shortness of breath or difficulty breathing  - fatigue  - muscle or body aches  - headache  - congestion or runny nose  Yes. Patient has at least one symptom as selected.     How many days since symptoms started? 5 days or less. Established patient, 12 years or older weighing at least 88.2 lbs, who has symptoms that started in the past 5 days, has not been hospitalized nor received treatment already, and is at risk for being very sick from COVID-19.     Treatment eligibility by RN:  Are you currently pregnant or nursing? No  Do you have a clinically significant hypersensitivity to nirmatrelvir or ritonavir, or toxic epidermal necrolysis (TEN) or House-Telly Syndrome? No  Do you have a history of hepatitis, any hepatic impairment on the Problem List (such as Child-Ramsey Class C, cirrhosis, fatty liver disease,  alcoholic liver disease), or was the last liver lab (hepatic panel, ALT, AST, ALK Phos, bilirubin) elevated in the past 6 months? No  Do you have any history of severe renal impairment (eGFR < 30mL/min)? No    Is patient eligible to continue? Yes, patient meets all eligibility requirements for the RN COVID treatment (as denoted by all no responses above).     Current Outpatient Medications   Medication Sig Dispense Refill    albuterol (PROAIR HFA/PROVENTIL HFA/VENTOLIN HFA) 108 (90 Base) MCG/ACT inhaler Inhale 2 puffs into the lungs every 6 hours as needed for shortness of breath, wheezing or cough. 18 g 0    benzonatate (TESSALON) 100 MG capsule Take 1 capsule (100 mg) by mouth 3 times daily as needed for cough. 21 capsule 0    famotidine (PEPCID) 40 MG tablet Take 1 tablet (40 mg) by mouth daily 90 tablet 3    penicillin V (VEETID) 500 MG tablet Take 1 tablet (500 mg) by mouth 2 times daily for 10 days. 20 tablet 0    propranolol (INDERAL) 10 MG tablet TAKE 1 TABLET(10 MG) BY MOUTH DAILY AS NEEDED FOR ANXIETY 90 tablet 0    sertraline (ZOLOFT) 50 MG tablet Start with Zoloft 25 mg daily for 2 weeks, if tolerating medication then increase to Zoloft 50 mg daily 90 tablet 0       Medications from List 1 of the standing order (on medications that exclude the use of Paxlovid) that patient is taking: NONE.   Is patient taking any meds from List 1? No.   Medications from List 2 of the standing order (on meds that provider needs to adjust) that patient is taking: NONE. Is patient on any of the meds from List 2? No.   Medications from List 3 of standing order (on meds that a RN needs to adjust) that patient is taking: NONE. Is patient on any meds from List 3? No.     Paxlovid has an approximate 90% reduction in hospitalization. Paxlovid can possibly cause altered sense of taste, diarrhea (loose, watery stools), high blood pressure, muscle aches.     Would patient like a Paxlovid prescription?   Yes.   Lab Results    Component Value Date    GFRESTIMATED >90 06/14/2024       Was last eGFR reduced? No, eGFR 60 or greater/ No Result on record. Patient can receive the normal renal function dose. Paxlovid Rx sent to [facility]     Temporary change to home medications: None    All medication adjustments (holds, etc) were discussed with the patient and patient was asked to repeat back (teachback) their med adjustment.  Did patient understand med adjustment? No medication adjustments needed.     Reviewed the following instructions with the patient:    Paxlovid (nimatrelvir and ritonavir)    How it works  Two medicines (nirmatrelvir and ritonavir) are taken together. They stop the virus from growing. Less amount of virus is easier for your body to fight.    How to take  Medicine comes in a daily container with both medicine tablets. Take by mouth twice daily (once in the morning, once at night) for 5 days.  The number of tablets to take varies by patient.  Don't chew or break capsules. Swallow whole.    When to take  Take as soon as possible after positive COVID-19 test result, and within 5 days of your first symptoms.    Possible side effects  Can cause altered sense of taste, diarrhea (loose, watery stools), high blood pressure, muscle aches.    Go Carrizales RN

## 2024-12-30 NOTE — TELEPHONE ENCOUNTER
See 12/28/24 telephone encounter.    CHERYLE CheneyN, RN-BC  [facility]

## 2024-12-31 ENCOUNTER — TELEPHONE (OUTPATIENT)
Dept: FAMILY MEDICINE | Facility: CLINIC | Age: 29
End: 2024-12-31
Payer: COMMERCIAL

## 2024-12-31 NOTE — TELEPHONE ENCOUNTER
Pt was rx paxlvid on 12/30/24.  The med is worse than the illness.  PT is asking- can she just stop the paxlovid.  YES.    Sx are metal taste in mouth causes increased cough.  Causing HA.    Pt will stop the Paxlovid.  AMAIRANI Austin

## 2025-01-21 ENCOUNTER — OFFICE VISIT (OUTPATIENT)
Dept: URGENT CARE | Facility: URGENT CARE | Age: 30
End: 2025-01-21
Payer: COMMERCIAL

## 2025-01-21 VITALS
TEMPERATURE: 97.2 F | SYSTOLIC BLOOD PRESSURE: 104 MMHG | DIASTOLIC BLOOD PRESSURE: 65 MMHG | RESPIRATION RATE: 18 BRPM | OXYGEN SATURATION: 96 % | HEART RATE: 69 BPM

## 2025-01-21 DIAGNOSIS — B34.9 VIRAL ILLNESS: Primary | ICD-10-CM

## 2025-01-21 DIAGNOSIS — R52 BODY ACHES: ICD-10-CM

## 2025-01-21 LAB
FLUAV AG SPEC QL IA: NEGATIVE
FLUBV AG SPEC QL IA: NEGATIVE

## 2025-01-21 PROCEDURE — 87804 INFLUENZA ASSAY W/OPTIC: CPT

## 2025-01-21 PROCEDURE — 99213 OFFICE O/P EST LOW 20 MIN: CPT

## 2025-01-21 NOTE — PATIENT INSTRUCTIONS
Influenza test is negative.  Get plenty of rest and drink fluids.  Can use Tylenol as needed for pain and fever.  Maximum dose of Tylenol is 4000mg in a 24 hour period of time.  You can use nasal saline spray, Flonase and/or humidifier/steam for your symptoms.

## 2025-01-21 NOTE — PROGRESS NOTES
ASSESSMENT:  (B34.9) Viral illness  (primary encounter diagnosis)    (R52) Body aches  Plan: Influenza A & B Antigen    PLAN:  Patient elected to leave the clinic prior to the influenza test result and will follow-up via [employer].  The following information was posted on [employer]: Influenza test is negative.  Get plenty of rest and drink fluids.  Can use Tylenol as needed for pain and fever.  Maximum dose of Tylenol is 4000mg in a 24 hour period of time.  You can use nasal saline spray, Flonase and/or humidifier/steam for your symptoms.    The use of Dragon/[employer] MODation services may have been used to construct the content in this note; any grammatical or spelling errors are non-intentional. Please contact the author of this note directly if you are in need of any clarification.      OCTAVIO Sheppard CNP      SUBJECTIVE:   Mervin Echevarria is a 29 year old female presenting with a chief complaint of runny nose, stuffy nose, ear fullness, headache, body aches, and fatigue.  Onset of symptoms was 2 day(s) ago.  Course of illness is worsening.    Patient denies: cough - non-productive, sore throat, vomiting, and diarrhea  Treatment measures tried include Tylenol and Afrin.  Predisposing factors include None but the patient reports having COVID and strep three weeks ago.  She states she fully recoverd and did not have any lasting symptoms.     ROS:  Negative except noted above.    OBJECTIVE:  /65   Pulse 69   Temp 97.2  F (36.2  C) (Tympanic)   Resp 18   LMP 11/29/2024 (Exact Date)   SpO2 96%   GENERAL APPEARANCE: healthy, alert and no distress  EYES: EOMI,  PERRL, conjunctiva clear  HENT: ear canals and TM's normal.  Nose and mouth without ulcers, erythema or lesions  NECK: supple, nontender, no lymphadenopathy  RESP: lungs clear to auscultation - no rales, rhonchi or wheezes  CV: regular rates and rhythm, normal S1 S2, no murmur noted  SKIN: no suspicious lesions or rashes

## 2025-04-03 ENCOUNTER — MYC MEDICAL ADVICE (OUTPATIENT)
Dept: FAMILY MEDICINE | Facility: CLINIC | Age: 30
End: 2025-04-03
Payer: COMMERCIAL

## 2025-04-03 DIAGNOSIS — F41.1 GAD (GENERALIZED ANXIETY DISORDER): ICD-10-CM

## 2025-04-04 NOTE — TELEPHONE ENCOUNTER
"Dr. Figueroa -- need pcp approval for increased prescription dose. Pended    \"I am up to 100mg of sertraline now, can I get a refill that reflects this? It's really helping. Thank you!\"    Janet Freitas RN  [facility]    "

## 2025-04-09 RX ORDER — SERTRALINE HYDROCHLORIDE 100 MG/1
100 TABLET, FILM COATED ORAL DAILY
Qty: 90 TABLET | Refills: 3 | Status: SHIPPED | OUTPATIENT
Start: 2025-04-09

## 2025-04-09 NOTE — TELEPHONE ENCOUNTER
Trenergi message sent to patient.  Norma SALAZAR BSN, PHN, RN-[facility]  [phone]

## 2025-05-29 ENCOUNTER — VIRTUAL VISIT (OUTPATIENT)
Dept: FAMILY MEDICINE | Facility: CLINIC | Age: 30
End: 2025-05-29
Payer: COMMERCIAL

## 2025-05-29 DIAGNOSIS — C43.39: ICD-10-CM

## 2025-05-29 DIAGNOSIS — E66.811 CLASS 1 OBESITY: Primary | ICD-10-CM

## 2025-05-29 DIAGNOSIS — N91.5 HYPOMENORRHEA/OLIGOMENORRHEA: ICD-10-CM

## 2025-05-29 DIAGNOSIS — Z83.3 FAMILY HISTORY OF DIABETES MELLITUS: ICD-10-CM

## 2025-05-29 DIAGNOSIS — C43.9 MELANOMA OF SKIN (H): ICD-10-CM

## 2025-05-29 DIAGNOSIS — K21.00 GASTROESOPHAGEAL REFLUX DISEASE WITH ESOPHAGITIS, UNSPECIFIED WHETHER HEMORRHAGE: ICD-10-CM

## 2025-05-29 DIAGNOSIS — Z13.1 SCREENING FOR DIABETES MELLITUS: ICD-10-CM

## 2025-05-29 DIAGNOSIS — N92.6 IRREGULAR PERIODS: ICD-10-CM

## 2025-05-29 DIAGNOSIS — E66.01 MORBID OBESITY (H): ICD-10-CM

## 2025-05-29 DIAGNOSIS — F33.0 MILD EPISODE OF RECURRENT MAJOR DEPRESSIVE DISORDER: ICD-10-CM

## 2025-05-29 DIAGNOSIS — F41.9 ANXIETY: ICD-10-CM

## 2025-05-29 DIAGNOSIS — Z13.220 SCREENING FOR LIPID DISORDERS: ICD-10-CM

## 2025-05-29 NOTE — PROGRESS NOTES
"Mervin is a 30 year old who is being evaluated via a billable video visit.    {ROOMING STAFF complete during rooming of virtual visit (Optional):084581}  {If patient encounters technical issues they should call [phone] :049057}    {PROVIDER CHARTING PREFERENCE:106791}    Subjective   Mervin is a 30 year old, presenting for the following health issues:  No chief complaint on file.  {(!) Visit Details have not yet been documented.  Please enter Visit Details and then use this list to pull in documentation. (Optional):310762}  History of Present Illness       Reason for visit:  Talk about concerns regarding prediabetes and weight    She eats 2-3 servings of fruits and vegetables daily.She consumes 1 sweetened beverage(s) daily.She exercises with enough effort to increase her heart rate 20 to 29 minutes per day.  She exercises with enough effort to increase her heart rate 4 days per week.   She is taking medications regularly.        {SUPERLIST (Optional):794754}  {additonal problems for provider to add (Optional):458497}    {ROS Picklists (Optional):466980}      Objective           Vitals:  No vitals were obtained today due to virtual visit.    Physical Exam   {video visit exam brief selected:443998}    {Diagnostic Test Results (Optional):206388}      Video-Visit Details    Type of service:  Video Visit   Originating Location (pt. Location): {video visit patient location:096220::\"Home\"}  {PROVIDER LOCATION On-site should be selected for visits conducted from your clinic location or adjoining [facility] hospital, academic office, or other nearby [facility] building. Off-site should be selected for all other provider locations, including home:576421}  Distant Location (provider location):  {virtual location provider:659446}  Platform used for Video Visit: {Virtual Visit Platforms:215046::\"[employer]\"}  Signed Electronically by: Elodia Pate MD  {Email feedback regarding this note to " [email or web address]   :888922}

## 2025-05-29 NOTE — PROGRESS NOTES
Mervin is a 30 year old who is being evaluated via a billable video visit.    How would you like to obtain your AVS? MyChart  If the video visit is dropped, the invitation should be resent by: Send to e-mail at: [email or web address]  Will anyone else be joining your video visit? No      Assessment & Plan   Mervin Echevarria is 30 year old , new patient to [facility], appointment made today to establish care, wt management & screen for chronic illness- such as Diabetes , hyperlipidemia   1. Class 1 obesity (Primary)  Plan: Medication options reviewed.  Previously she has tried phentermine and it caused irritability and worsening of anxiety.  We also discussed naltrexone and Topamax and she has deferred oral medication.  She is voicing interest in GLP-1.  The medication class, mechanism of action, and side effects are discussed in detail.  I have also encouraged her to proceed with medication management referral.  Will start her on low-dose of tricipital diet and depending on the response we will need to gradually increase the dose as tolerated.  She does report history of binge eating and reports no concerns for more than a decade.  Meanwhile encouraged her to continue with the clean eating habits, avoiding processed sugar, and staying physically active.  Reviewed previous and recent blood test and I do recommend to check thyroid as well.  - tirzepatide-Weight Management (ZEPBOUND) 2.5 MG/0.5ML prefilled pen; Inject 0.5 mLs (2.5 mg) subcutaneously once a week.  Dispense: 2 mL; Refill: 0  - Med Therapy Management Referral  - TSH with free T4 Reflex [[id number]]; Future    2. Irregular periods  - CBC with Platelets [[id number]]; Future  - Ferritin  [LAB68]; Future    3. Hypomenorrhea/oligomenorrhea  She reports since menarche at age 12 she has had scant menstrual cycle.  She was pregnant once and had an .  Regarding spotting in between.  I do recommend to proceed with blood test as above to rule out anemia.  She can  "start over-the-counter iron tablets.  Also advised to proceed with ultrasound pelvis for identifying uterine pathology versus polycystic ovarian disease.  Will also check FSH LH.      - US Pelvic Complete with Transvaginal; Future  - Follicle stimulating hormone; Future  - Luteinizing Hormone; Future    4. Family history of diabetes mellitus  Dad has end-stage renal disease due to uncontrolled diabetes.  - Hemoglobin A1c; Future    5. Screening for diabetes mellitus  Screening labs as lab only appointment advised.  - Hemoglobin A1c; Future  - Comprehensive metabolic panel (BMP + Alb, Alk Phos, ALT, AST, Total. Bili, TP); Future    6. Screening for lipid disorders  Fasting instructions are given for at least 8 hours of no calories during which time it is best to stay well-hydrated  - Lipid Profile (Chol, Trig, HDL, LDL calc); Future    7. Anxiety  8. Mild episode of recurrent major depressive disorder  Plan.  She has been on sertraline 100 mg daily.  She does report some weight gain secondary to SSRI as well.  We also talked about considering lowering the dose or switching it to SNRI versus Wellbutrin.  For now she is elected to stay with current dose of SSRI.    9.  Malignant melanoma diagnosed June 2024 reports she is under care of dermatology at [facility].  Status post wide excision right upper forehead        Gastroesophageal reflux disease with esophagitis, unspecified whether hemorrhage  Plan.  She does take famotidine as needed            BMI  Estimated body mass index is 31.48 kg/m  as calculated from the following:    Height as of 10/11/24: 1.637 m (5' 4.45\").    Weight as of 12/27/24: 84.4 kg (186 lb).   Weight management plan: Discussed healthy diet and exercise guidelines      Follow-up monthly follow up for medications management for gLP1       Subjective   Mervin is a 30 year old, presenting for the following health issues:  Diabetes and Establish Care        5/29/2025    12:31 PM   Additional Questions " "  Roomed by Debby BALES     History of Present Illness       Reason for visit:  Talk about concerns regarding prediabetes and weight    She eats 2-3 servings of fruits and vegetables daily.She consumes 1 sweetened beverage(s) daily.She exercises with enough effort to increase her heart rate 20 to 29 minutes per day.  She exercises with enough effort to increase her heart rate 4 days per week.   She is taking medications regularly.      She reports she would like test pertinent to diabetes because of strong family history of diabetes.  Father is 65-year-old and suffering from end-stage renal disease due to diabetes and currently on renal transplant list.        She reports weight issues since .  Phentermine worked and it caused increased irritability and felt too much caffeinated and was able to keep the weight loss for 1-1/2 years.  However since she has been in need to start SSRI/sertraline she has gained weight back.  She reports she eats healthy, follows mostly Mediterranean diet and she cooks at home.  She also reports regular exercise and therapy.        Another concern is scanty.'s and in between.  Some spotting.  She reports menarche at age 12.  Had 1 pregnancy that ended in .  Since then she has adopted child who is 4-year-old.  Partner has vasectomy.  Denies history of abnormal Pap smear or cervical cancers.          Review of Systems  Constitutional, HEENT, cardiovascular, pulmonary, GI, , musculoskeletal, neuro, skin, endocrine and psych systems are negative, except as otherwise noted.      Objective    Vitals - Patient Reported  Weight (Patient Reported): 88.5 kg (195 lb)  Height (Patient Reported): 163 cm (5' 4.17\")  BMI (Based on Pt Reported Ht/Wt): 33.29      Vitals:  No vitals were obtained today due to virtual visit.    Physical Exam   GENERAL: alert and no distress  EYES: Eyes grossly normal to inspection.  No discharge or erythema, or obvious scleral/conjunctival abnormalities.  RESP: " No audible wheeze, cough, or visible cyanosis.    SKIN: Visible skin clear. No significant rash, abnormal pigmentation or lesions.  NEURO: Cranial nerves grossly intact.  Mentation and speech appropriate for age.  PSYCH: Appropriate affect, tone, and pace of words          Video-Visit Details    Type of service:  Video Visit   Originating Location (pt. Location): Home  Distant Location (provider location):  On-site  Platform used for Video Visit: [facility]  Signed Electronically by: Elodia Pate MD  Ordering of each unique test  Prescription drug management  I spent a total of 44 minutes on the day of the visit.   Time spent by me today doing chart review, history and exam, documentation and further activities per the note   Statement Selected

## 2025-06-02 ENCOUNTER — TRANSFERRED RECORDS (OUTPATIENT)
Dept: HEALTH INFORMATION MANAGEMENT | Facility: CLINIC | Age: 30
End: 2025-06-02
Payer: COMMERCIAL

## 2025-06-02 ENCOUNTER — TELEPHONE (OUTPATIENT)
Dept: FAMILY MEDICINE | Facility: CLINIC | Age: 30
End: 2025-06-02
Payer: COMMERCIAL

## 2025-06-02 NOTE — TELEPHONE ENCOUNTER
MTM referral from: [facility] visit (referral by provider)    MTM referral outreach attempt #2 on June 2, 2025 at 10:16 AM      Outcome: Left Message    Use [employer] fully insured for the carrier/Plan on the flowsheet      [place] Message Sent    Georgette Le CMA  MTM

## 2025-07-31 ENCOUNTER — PATIENT OUTREACH (OUTPATIENT)
Dept: CARE COORDINATION | Facility: CLINIC | Age: 30
End: 2025-07-31
Payer: COMMERCIAL

## 2025-08-11 ENCOUNTER — VIRTUAL VISIT (OUTPATIENT)
Dept: FAMILY MEDICINE | Facility: CLINIC | Age: 30
End: 2025-08-11
Payer: COMMERCIAL

## 2025-08-11 DIAGNOSIS — F41.1 GAD (GENERALIZED ANXIETY DISORDER): ICD-10-CM

## 2025-08-11 DIAGNOSIS — N91.5 HYPOMENORRHEA/OLIGOMENORRHEA: ICD-10-CM

## 2025-08-11 DIAGNOSIS — E66.01 MORBID OBESITY (H): Primary | ICD-10-CM

## 2025-08-11 PROCEDURE — 98006 SYNCH AUDIO-VIDEO EST MOD 30: CPT | Performed by: FAMILY MEDICINE

## 2025-08-11 RX ORDER — PHENTERMINE HYDROCHLORIDE 15 MG/1
15 CAPSULE ORAL EVERY MORNING
Qty: 30 CAPSULE | Refills: 2 | Status: SHIPPED | OUTPATIENT
Start: 2025-08-11

## 2025-08-11 ASSESSMENT — ANXIETY QUESTIONNAIRES
4. TROUBLE RELAXING: NOT AT ALL
GAD7 TOTAL SCORE: 4
8. IF YOU CHECKED OFF ANY PROBLEMS, HOW DIFFICULT HAVE THESE MADE IT FOR YOU TO DO YOUR WORK, TAKE CARE OF THINGS AT HOME, OR GET ALONG WITH OTHER PEOPLE?: SOMEWHAT DIFFICULT
7. FEELING AFRAID AS IF SOMETHING AWFUL MIGHT HAPPEN: NOT AT ALL
6. BECOMING EASILY ANNOYED OR IRRITABLE: SEVERAL DAYS
GAD7 TOTAL SCORE: 4
2. NOT BEING ABLE TO STOP OR CONTROL WORRYING: SEVERAL DAYS
GAD7 TOTAL SCORE: 4
IF YOU CHECKED OFF ANY PROBLEMS ON THIS QUESTIONNAIRE, HOW DIFFICULT HAVE THESE PROBLEMS MADE IT FOR YOU TO DO YOUR WORK, TAKE CARE OF THINGS AT HOME, OR GET ALONG WITH OTHER PEOPLE: SOMEWHAT DIFFICULT
3. WORRYING TOO MUCH ABOUT DIFFERENT THINGS: SEVERAL DAYS
5. BEING SO RESTLESS THAT IT IS HARD TO SIT STILL: NOT AT ALL
1. FEELING NERVOUS, ANXIOUS, OR ON EDGE: SEVERAL DAYS
7. FEELING AFRAID AS IF SOMETHING AWFUL MIGHT HAPPEN: NOT AT ALL

## 2025-08-11 ASSESSMENT — PATIENT HEALTH QUESTIONNAIRE - PHQ9
SUM OF ALL RESPONSES TO PHQ QUESTIONS 1-9: 3
SUM OF ALL RESPONSES TO PHQ QUESTIONS 1-9: 3
10. IF YOU CHECKED OFF ANY PROBLEMS, HOW DIFFICULT HAVE THESE PROBLEMS MADE IT FOR YOU TO DO YOUR WORK, TAKE CARE OF THINGS AT HOME, OR GET ALONG WITH OTHER PEOPLE: NOT DIFFICULT AT ALL

## 2025-08-13 ENCOUNTER — TELEPHONE (OUTPATIENT)
Dept: PHARMACY | Facility: OTHER | Age: 30
End: 2025-08-13
Payer: COMMERCIAL

## 2025-08-16 ENCOUNTER — LAB (OUTPATIENT)
Dept: LAB | Facility: CLINIC | Age: 30
End: 2025-08-16
Payer: COMMERCIAL

## 2025-08-16 DIAGNOSIS — N91.5 HYPOMENORRHEA/OLIGOMENORRHEA: ICD-10-CM

## 2025-08-16 DIAGNOSIS — N92.6 IRREGULAR PERIODS: ICD-10-CM

## 2025-08-16 DIAGNOSIS — E66.811 CLASS 1 OBESITY: ICD-10-CM

## 2025-08-16 DIAGNOSIS — Z13.220 SCREENING FOR LIPID DISORDERS: ICD-10-CM

## 2025-08-16 DIAGNOSIS — Z13.1 SCREENING FOR DIABETES MELLITUS: ICD-10-CM

## 2025-08-16 DIAGNOSIS — Z83.3 FAMILY HISTORY OF DIABETES MELLITUS: ICD-10-CM

## 2025-08-16 LAB
ALBUMIN SERPL BCG-MCNC: 4.2 G/DL (ref 3.5–5.2)
ALP SERPL-CCNC: 76 U/L (ref 40–150)
ALT SERPL W P-5'-P-CCNC: 13 U/L (ref 0–50)
ANION GAP SERPL CALCULATED.3IONS-SCNC: 11 MMOL/L (ref 7–15)
AST SERPL W P-5'-P-CCNC: 22 U/L (ref 0–45)
BILIRUB SERPL-MCNC: 0.3 MG/DL
BUN SERPL-MCNC: 11.6 MG/DL (ref 6–20)
CALCIUM SERPL-MCNC: 9.6 MG/DL (ref 8.8–10.4)
CHLORIDE SERPL-SCNC: 105 MMOL/L (ref 98–107)
CHOLEST SERPL-MCNC: 189 MG/DL
CREAT SERPL-MCNC: 0.84 MG/DL (ref 0.51–0.95)
EGFRCR SERPLBLD CKD-EPI 2021: >90 ML/MIN/1.73M2
ERYTHROCYTE [DISTWIDTH] IN BLOOD BY AUTOMATED COUNT: 12.6 % (ref 10–15)
EST. AVERAGE GLUCOSE BLD GHB EST-MCNC: 103 MG/DL
FASTING STATUS PATIENT QL REPORTED: YES
FASTING STATUS PATIENT QL REPORTED: YES
FERRITIN SERPL-MCNC: 100 NG/ML (ref 6–175)
FSH SERPL IRP2-ACNC: 7.4 MIU/ML
GLUCOSE SERPL-MCNC: 68 MG/DL (ref 70–99)
HBA1C MFR BLD: 5.2 % (ref 0–5.6)
HCO3 SERPL-SCNC: 25 MMOL/L (ref 22–29)
HCT VFR BLD AUTO: 44.1 % (ref 35–47)
HDLC SERPL-MCNC: 50 MG/DL
HGB BLD-MCNC: 14.5 G/DL (ref 11.7–15.7)
LDLC SERPL CALC-MCNC: 118 MG/DL
LH SERPL-ACNC: 26.4 MIU/ML
MCH RBC QN AUTO: 28.5 PG (ref 26.5–33)
MCHC RBC AUTO-ENTMCNC: 32.9 G/DL (ref 31.5–36.5)
MCV RBC AUTO: 86.6 FL (ref 78–100)
NONHDLC SERPL-MCNC: 139 MG/DL
PLATELET # BLD AUTO: 287 10E3/UL (ref 150–450)
POTASSIUM SERPL-SCNC: 3.9 MMOL/L (ref 3.4–5.3)
PROT SERPL-MCNC: 7.2 G/DL (ref 6.4–8.3)
RBC # BLD AUTO: 5.09 10E6/UL (ref 3.8–5.2)
SODIUM SERPL-SCNC: 141 MMOL/L (ref 135–145)
TRIGL SERPL-MCNC: 107 MG/DL
TSH SERPL DL<=0.005 MIU/L-ACNC: 0.87 UIU/ML (ref 0.3–4.2)
WBC # BLD AUTO: 10.26 10E3/UL (ref 4–11)

## 2025-08-16 PROCEDURE — 83001 ASSAY OF GONADOTROPIN (FSH): CPT

## 2025-08-16 PROCEDURE — 83036 HEMOGLOBIN GLYCOSYLATED A1C: CPT

## 2025-08-16 PROCEDURE — 36415 COLL VENOUS BLD VENIPUNCTURE: CPT

## 2025-08-16 PROCEDURE — 82728 ASSAY OF FERRITIN: CPT

## 2025-08-16 PROCEDURE — 85027 COMPLETE CBC AUTOMATED: CPT

## 2025-08-16 PROCEDURE — 84443 ASSAY THYROID STIM HORMONE: CPT

## 2025-08-16 PROCEDURE — 80061 LIPID PANEL: CPT

## 2025-08-16 PROCEDURE — 83002 ASSAY OF GONADOTROPIN (LH): CPT

## 2025-08-16 PROCEDURE — 80053 COMPREHEN METABOLIC PANEL: CPT

## 2025-08-18 ENCOUNTER — RESULTS FOLLOW-UP (OUTPATIENT)
Dept: FAMILY MEDICINE | Facility: CLINIC | Age: 30
End: 2025-08-18
Payer: COMMERCIAL